# Patient Record
Sex: FEMALE | Race: WHITE | NOT HISPANIC OR LATINO | Employment: OTHER | URBAN - METROPOLITAN AREA
[De-identification: names, ages, dates, MRNs, and addresses within clinical notes are randomized per-mention and may not be internally consistent; named-entity substitution may affect disease eponyms.]

---

## 2017-02-27 ENCOUNTER — HOSPITAL ENCOUNTER (OUTPATIENT)
Dept: GENERAL RADIOLOGY | Facility: HOSPITAL | Age: 82
Discharge: HOME | End: 2017-02-27
Admitting: NURSE PRACTITIONER

## 2017-02-27 ENCOUNTER — TRANSCRIBE ORDERS (OUTPATIENT)
Dept: ADMINISTRATIVE | Facility: HOSPITAL | Age: 82
End: 2017-02-27

## 2017-02-27 DIAGNOSIS — M25.511 RIGHT SHOULDER PAIN, UNSPECIFIED CHRONICITY: Primary | ICD-10-CM

## 2017-02-27 DIAGNOSIS — M25.511 RIGHT SHOULDER PAIN, UNSPECIFIED CHRONICITY: ICD-10-CM

## 2017-02-27 PROCEDURE — 73030 X-RAY EXAM OF SHOULDER: CPT

## 2017-12-28 ENCOUNTER — LAB (OUTPATIENT)
Dept: LAB | Facility: HOSPITAL | Age: 82
End: 2017-12-28

## 2017-12-28 ENCOUNTER — TRANSCRIBE ORDERS (OUTPATIENT)
Dept: ADMINISTRATIVE | Facility: HOSPITAL | Age: 82
End: 2017-12-28

## 2017-12-28 DIAGNOSIS — M05.89 OTHER RHEUMATOID ARTHRITIS WITH RHEUMATOID FACTOR OF MULTIPLE SITES (CODE): ICD-10-CM

## 2017-12-28 DIAGNOSIS — M05.89 OTHER RHEUMATOID ARTHRITIS WITH RHEUMATOID FACTOR OF MULTIPLE SITES (CODE): Primary | ICD-10-CM

## 2017-12-28 LAB
ALBUMIN SERPL-MCNC: 3.8 G/DL (ref 3.5–5.2)
ALBUMIN/GLOB SERPL: 1.2 G/DL
ALP SERPL-CCNC: 65 U/L (ref 39–117)
ALT SERPL W P-5'-P-CCNC: 18 U/L (ref 1–33)
ANION GAP SERPL CALCULATED.3IONS-SCNC: 9.9 MMOL/L
AST SERPL-CCNC: 32 U/L (ref 1–32)
BASOPHILS # BLD AUTO: 0.03 10*3/MM3 (ref 0–0.2)
BASOPHILS NFR BLD AUTO: 0.5 % (ref 0–1.5)
BILIRUB SERPL-MCNC: 0.2 MG/DL (ref 0.1–1.2)
BUN BLD-MCNC: 21 MG/DL (ref 8–23)
BUN/CREAT SERPL: 26.9 (ref 7–25)
CALCIUM SPEC-SCNC: 8.7 MG/DL (ref 8.6–10.5)
CHLORIDE SERPL-SCNC: 102 MMOL/L (ref 98–107)
CO2 SERPL-SCNC: 29.1 MMOL/L (ref 22–29)
CREAT BLD-MCNC: 0.78 MG/DL (ref 0.57–1)
CRP SERPL-MCNC: 0.14 MG/DL (ref 0–0.5)
DEPRECATED RDW RBC AUTO: 56.8 FL (ref 37–54)
EOSINOPHIL # BLD AUTO: 0.29 10*3/MM3 (ref 0–0.7)
EOSINOPHIL NFR BLD AUTO: 5 % (ref 0.3–6.2)
ERYTHROCYTE [DISTWIDTH] IN BLOOD BY AUTOMATED COUNT: 15.3 % (ref 11.7–13)
GFR SERPL CREATININE-BSD FRML MDRD: 71 ML/MIN/1.73
GLOBULIN UR ELPH-MCNC: 3.3 GM/DL
GLUCOSE BLD-MCNC: 71 MG/DL (ref 65–99)
HCT VFR BLD AUTO: 38.2 % (ref 35.6–45.5)
HGB BLD-MCNC: 11.6 G/DL (ref 11.9–15.5)
IMM GRANULOCYTES # BLD: 0.02 10*3/MM3 (ref 0–0.03)
IMM GRANULOCYTES NFR BLD: 0.3 % (ref 0–0.5)
LYMPHOCYTES # BLD AUTO: 1.76 10*3/MM3 (ref 0.9–4.8)
LYMPHOCYTES NFR BLD AUTO: 30.4 % (ref 19.6–45.3)
MCH RBC QN AUTO: 31.4 PG (ref 26.9–32)
MCHC RBC AUTO-ENTMCNC: 30.4 G/DL (ref 32.4–36.3)
MCV RBC AUTO: 103.5 FL (ref 80.5–98.2)
MONOCYTES # BLD AUTO: 0.71 10*3/MM3 (ref 0.2–1.2)
MONOCYTES NFR BLD AUTO: 12.3 % (ref 5–12)
NEUTROPHILS # BLD AUTO: 2.98 10*3/MM3 (ref 1.9–8.1)
NEUTROPHILS NFR BLD AUTO: 51.5 % (ref 42.7–76)
PLATELET # BLD AUTO: 218 10*3/MM3 (ref 140–500)
PMV BLD AUTO: 12.3 FL (ref 6–12)
POTASSIUM BLD-SCNC: 4.1 MMOL/L (ref 3.5–5.2)
PROT SERPL-MCNC: 7.1 G/DL (ref 6–8.5)
RBC # BLD AUTO: 3.69 10*6/MM3 (ref 3.9–5.2)
SODIUM BLD-SCNC: 141 MMOL/L (ref 136–145)
WBC NRBC COR # BLD: 5.79 10*3/MM3 (ref 4.5–10.7)

## 2017-12-28 PROCEDURE — 86140 C-REACTIVE PROTEIN: CPT | Performed by: NURSE PRACTITIONER

## 2017-12-28 PROCEDURE — 80053 COMPREHEN METABOLIC PANEL: CPT | Performed by: NURSE PRACTITIONER

## 2017-12-28 PROCEDURE — 85025 COMPLETE CBC W/AUTO DIFF WBC: CPT | Performed by: NURSE PRACTITIONER

## 2017-12-28 PROCEDURE — 36415 COLL VENOUS BLD VENIPUNCTURE: CPT

## 2018-04-27 ENCOUNTER — TRANSCRIBE ORDERS (OUTPATIENT)
Dept: ADMINISTRATIVE | Facility: HOSPITAL | Age: 83
End: 2018-04-27

## 2018-04-27 ENCOUNTER — LAB (OUTPATIENT)
Dept: LAB | Facility: HOSPITAL | Age: 83
End: 2018-04-27

## 2018-04-27 DIAGNOSIS — M05.89 OTHER RHEUMATOID ARTHRITIS WITH RHEUMATOID FACTOR OF MULTIPLE SITES (CODE): Primary | ICD-10-CM

## 2018-04-27 DIAGNOSIS — M05.89 OTHER RHEUMATOID ARTHRITIS WITH RHEUMATOID FACTOR OF MULTIPLE SITES (CODE): ICD-10-CM

## 2018-04-27 LAB
ALBUMIN SERPL-MCNC: 3.9 G/DL (ref 3.5–5.2)
ALBUMIN/GLOB SERPL: 1.2 G/DL
ALP SERPL-CCNC: 71 U/L (ref 39–117)
ALT SERPL W P-5'-P-CCNC: 15 U/L (ref 1–33)
ANION GAP SERPL CALCULATED.3IONS-SCNC: 9.9 MMOL/L
AST SERPL-CCNC: 29 U/L (ref 1–32)
BASOPHILS # BLD AUTO: 0.02 10*3/MM3 (ref 0–0.2)
BASOPHILS NFR BLD AUTO: 0.3 % (ref 0–1.5)
BILIRUB SERPL-MCNC: 0.3 MG/DL (ref 0.1–1.2)
BUN BLD-MCNC: 24 MG/DL (ref 8–23)
BUN/CREAT SERPL: 27 (ref 7–25)
CALCIUM SPEC-SCNC: 9.3 MG/DL (ref 8.6–10.5)
CHLORIDE SERPL-SCNC: 103 MMOL/L (ref 98–107)
CO2 SERPL-SCNC: 27.1 MMOL/L (ref 22–29)
CREAT BLD-MCNC: 0.89 MG/DL (ref 0.57–1)
CRP SERPL-MCNC: 0.22 MG/DL (ref 0–0.5)
DEPRECATED RDW RBC AUTO: 65.5 FL (ref 37–54)
EOSINOPHIL # BLD AUTO: 0.3 10*3/MM3 (ref 0–0.7)
EOSINOPHIL NFR BLD AUTO: 4.2 % (ref 0.3–6.2)
ERYTHROCYTE [DISTWIDTH] IN BLOOD BY AUTOMATED COUNT: 17.5 % (ref 11.7–13)
GFR SERPL CREATININE-BSD FRML MDRD: 61 ML/MIN/1.73
GLOBULIN UR ELPH-MCNC: 3.2 GM/DL
GLUCOSE BLD-MCNC: 74 MG/DL (ref 65–99)
HCT VFR BLD AUTO: 37.6 % (ref 35.6–45.5)
HGB BLD-MCNC: 11.2 G/DL (ref 11.9–15.5)
IMM GRANULOCYTES # BLD: 0.02 10*3/MM3 (ref 0–0.03)
IMM GRANULOCYTES NFR BLD: 0.3 % (ref 0–0.5)
LYMPHOCYTES # BLD AUTO: 1.9 10*3/MM3 (ref 0.9–4.8)
LYMPHOCYTES NFR BLD AUTO: 26.7 % (ref 19.6–45.3)
MCH RBC QN AUTO: 30.5 PG (ref 26.9–32)
MCHC RBC AUTO-ENTMCNC: 29.8 G/DL (ref 32.4–36.3)
MCV RBC AUTO: 102.5 FL (ref 80.5–98.2)
MONOCYTES # BLD AUTO: 0.82 10*3/MM3 (ref 0.2–1.2)
MONOCYTES NFR BLD AUTO: 11.5 % (ref 5–12)
NEUTROPHILS # BLD AUTO: 4.06 10*3/MM3 (ref 1.9–8.1)
NEUTROPHILS NFR BLD AUTO: 57 % (ref 42.7–76)
PLATELET # BLD AUTO: 218 10*3/MM3 (ref 140–500)
PMV BLD AUTO: 11.8 FL (ref 6–12)
POTASSIUM BLD-SCNC: 4.7 MMOL/L (ref 3.5–5.2)
PROT SERPL-MCNC: 7.1 G/DL (ref 6–8.5)
RBC # BLD AUTO: 3.67 10*6/MM3 (ref 3.9–5.2)
SODIUM BLD-SCNC: 140 MMOL/L (ref 136–145)
WBC NRBC COR # BLD: 7.12 10*3/MM3 (ref 4.5–10.7)

## 2018-04-27 PROCEDURE — 36415 COLL VENOUS BLD VENIPUNCTURE: CPT

## 2018-04-27 PROCEDURE — 80053 COMPREHEN METABOLIC PANEL: CPT | Performed by: NURSE PRACTITIONER

## 2018-04-27 PROCEDURE — 86140 C-REACTIVE PROTEIN: CPT | Performed by: NURSE PRACTITIONER

## 2018-04-27 PROCEDURE — 85025 COMPLETE CBC W/AUTO DIFF WBC: CPT | Performed by: NURSE PRACTITIONER

## 2018-08-02 ENCOUNTER — TRANSCRIBE ORDERS (OUTPATIENT)
Dept: ADMINISTRATIVE | Facility: HOSPITAL | Age: 83
End: 2018-08-02

## 2018-08-02 ENCOUNTER — LAB (OUTPATIENT)
Dept: LAB | Facility: HOSPITAL | Age: 83
End: 2018-08-02

## 2018-08-02 DIAGNOSIS — M05.89 OTHER RHEUMATOID ARTHRITIS WITH RHEUMATOID FACTOR OF MULTIPLE SITES (CODE): Primary | ICD-10-CM

## 2018-08-02 DIAGNOSIS — M05.89 OTHER RHEUMATOID ARTHRITIS WITH RHEUMATOID FACTOR OF MULTIPLE SITES (CODE): ICD-10-CM

## 2018-08-02 LAB
ALBUMIN SERPL-MCNC: 3.9 G/DL (ref 3.5–5.2)
ALBUMIN/GLOB SERPL: 1.4 G/DL
ALP SERPL-CCNC: 57 U/L (ref 39–117)
ALT SERPL W P-5'-P-CCNC: 14 U/L (ref 1–33)
ANION GAP SERPL CALCULATED.3IONS-SCNC: 11.6 MMOL/L
AST SERPL-CCNC: 26 U/L (ref 1–32)
BASOPHILS # BLD AUTO: 0.02 10*3/MM3 (ref 0–0.2)
BASOPHILS NFR BLD AUTO: 0.3 % (ref 0–1.5)
BILIRUB SERPL-MCNC: 0.2 MG/DL (ref 0.1–1.2)
BUN BLD-MCNC: 27 MG/DL (ref 8–23)
BUN/CREAT SERPL: 21.6 (ref 7–25)
CALCIUM SPEC-SCNC: 8.4 MG/DL (ref 8.6–10.5)
CHLORIDE SERPL-SCNC: 103 MMOL/L (ref 98–107)
CO2 SERPL-SCNC: 25.4 MMOL/L (ref 22–29)
CREAT BLD-MCNC: 1.25 MG/DL (ref 0.57–1)
CRP SERPL-MCNC: 0.12 MG/DL (ref 0–0.5)
DEPRECATED RDW RBC AUTO: 58.3 FL (ref 37–54)
EOSINOPHIL # BLD AUTO: 0.34 10*3/MM3 (ref 0–0.7)
EOSINOPHIL NFR BLD AUTO: 4.6 % (ref 0.3–6.2)
ERYTHROCYTE [DISTWIDTH] IN BLOOD BY AUTOMATED COUNT: 15.5 % (ref 11.7–13)
GFR SERPL CREATININE-BSD FRML MDRD: 41 ML/MIN/1.73
GLOBULIN UR ELPH-MCNC: 2.7 GM/DL
GLUCOSE BLD-MCNC: 80 MG/DL (ref 65–99)
HCT VFR BLD AUTO: 36.2 % (ref 35.6–45.5)
HGB BLD-MCNC: 11.2 G/DL (ref 11.9–15.5)
IMM GRANULOCYTES # BLD: 0.02 10*3/MM3 (ref 0–0.03)
IMM GRANULOCYTES NFR BLD: 0.3 % (ref 0–0.5)
LYMPHOCYTES # BLD AUTO: 2.4 10*3/MM3 (ref 0.9–4.8)
LYMPHOCYTES NFR BLD AUTO: 32.6 % (ref 19.6–45.3)
MCH RBC QN AUTO: 31.6 PG (ref 26.9–32)
MCHC RBC AUTO-ENTMCNC: 30.9 G/DL (ref 32.4–36.3)
MCV RBC AUTO: 102.3 FL (ref 80.5–98.2)
MONOCYTES # BLD AUTO: 0.84 10*3/MM3 (ref 0.2–1.2)
MONOCYTES NFR BLD AUTO: 11.4 % (ref 5–12)
NEUTROPHILS # BLD AUTO: 3.76 10*3/MM3 (ref 1.9–8.1)
NEUTROPHILS NFR BLD AUTO: 51.1 % (ref 42.7–76)
PLATELET # BLD AUTO: 199 10*3/MM3 (ref 140–500)
PMV BLD AUTO: 12.2 FL (ref 6–12)
POTASSIUM BLD-SCNC: 4.5 MMOL/L (ref 3.5–5.2)
PROT SERPL-MCNC: 6.6 G/DL (ref 6–8.5)
RBC # BLD AUTO: 3.54 10*6/MM3 (ref 3.9–5.2)
SODIUM BLD-SCNC: 140 MMOL/L (ref 136–145)
WBC NRBC COR # BLD: 7.36 10*3/MM3 (ref 4.5–10.7)

## 2018-08-02 PROCEDURE — 86140 C-REACTIVE PROTEIN: CPT | Performed by: NURSE PRACTITIONER

## 2018-08-02 PROCEDURE — 80053 COMPREHEN METABOLIC PANEL: CPT | Performed by: NURSE PRACTITIONER

## 2018-08-02 PROCEDURE — 85025 COMPLETE CBC W/AUTO DIFF WBC: CPT | Performed by: NURSE PRACTITIONER

## 2018-08-02 PROCEDURE — 36415 COLL VENOUS BLD VENIPUNCTURE: CPT

## 2018-10-04 ENCOUNTER — TRANSCRIBE ORDERS (OUTPATIENT)
Dept: ADMINISTRATIVE | Facility: HOSPITAL | Age: 83
End: 2018-10-04

## 2018-10-04 ENCOUNTER — LAB (OUTPATIENT)
Dept: LAB | Facility: HOSPITAL | Age: 83
End: 2018-10-04

## 2018-10-04 DIAGNOSIS — M05.89 OTHER RHEUMATOID ARTHRITIS WITH RHEUMATOID FACTOR OF MULTIPLE SITES (HCC): Primary | ICD-10-CM

## 2018-10-04 DIAGNOSIS — M05.89 OTHER RHEUMATOID ARTHRITIS WITH RHEUMATOID FACTOR OF MULTIPLE SITES (HCC): ICD-10-CM

## 2018-10-04 LAB
ALBUMIN SERPL-MCNC: 3.6 G/DL (ref 3.5–5.2)
ALBUMIN/GLOB SERPL: 1.1 G/DL
ALP SERPL-CCNC: 63 U/L (ref 39–117)
ALT SERPL W P-5'-P-CCNC: 19 U/L (ref 1–33)
ANION GAP SERPL CALCULATED.3IONS-SCNC: 9.8 MMOL/L
AST SERPL-CCNC: 35 U/L (ref 1–32)
BASOPHILS # BLD AUTO: 0.01 10*3/MM3 (ref 0–0.2)
BASOPHILS NFR BLD AUTO: 0.2 % (ref 0–1.5)
BILIRUB SERPL-MCNC: 0.3 MG/DL (ref 0.1–1.2)
BUN BLD-MCNC: 22 MG/DL (ref 8–23)
BUN/CREAT SERPL: 26.5 (ref 7–25)
CALCIUM SPEC-SCNC: 8.7 MG/DL (ref 8.6–10.5)
CHLORIDE SERPL-SCNC: 101 MMOL/L (ref 98–107)
CO2 SERPL-SCNC: 27.2 MMOL/L (ref 22–29)
CREAT BLD-MCNC: 0.83 MG/DL (ref 0.57–1)
CRP SERPL-MCNC: 0.16 MG/DL (ref 0–0.5)
DEPRECATED RDW RBC AUTO: 53.6 FL (ref 37–54)
EOSINOPHIL # BLD AUTO: 0.29 10*3/MM3 (ref 0–0.7)
EOSINOPHIL NFR BLD AUTO: 4.9 % (ref 0.3–6.2)
ERYTHROCYTE [DISTWIDTH] IN BLOOD BY AUTOMATED COUNT: 14.9 % (ref 11.7–13)
GFR SERPL CREATININE-BSD FRML MDRD: 65 ML/MIN/1.73
GLOBULIN UR ELPH-MCNC: 3.3 GM/DL
GLUCOSE BLD-MCNC: 82 MG/DL (ref 65–99)
HCT VFR BLD AUTO: 36 % (ref 35.6–45.5)
HGB BLD-MCNC: 11.4 G/DL (ref 11.9–15.5)
IMM GRANULOCYTES # BLD: 0.01 10*3/MM3 (ref 0–0.03)
IMM GRANULOCYTES NFR BLD: 0.2 % (ref 0–0.5)
LYMPHOCYTES # BLD AUTO: 1.93 10*3/MM3 (ref 0.9–4.8)
LYMPHOCYTES NFR BLD AUTO: 32.6 % (ref 19.6–45.3)
MCH RBC QN AUTO: 31.5 PG (ref 26.9–32)
MCHC RBC AUTO-ENTMCNC: 31.7 G/DL (ref 32.4–36.3)
MCV RBC AUTO: 99.4 FL (ref 80.5–98.2)
MONOCYTES # BLD AUTO: 0.66 10*3/MM3 (ref 0.2–1.2)
MONOCYTES NFR BLD AUTO: 11.1 % (ref 5–12)
NEUTROPHILS # BLD AUTO: 3.03 10*3/MM3 (ref 1.9–8.1)
NEUTROPHILS NFR BLD AUTO: 51.2 % (ref 42.7–76)
PLATELET # BLD AUTO: 140 10*3/MM3 (ref 140–500)
PMV BLD AUTO: 11.1 FL (ref 6–12)
POTASSIUM BLD-SCNC: 4.6 MMOL/L (ref 3.5–5.2)
PROT SERPL-MCNC: 6.9 G/DL (ref 6–8.5)
RBC # BLD AUTO: 3.62 10*6/MM3 (ref 3.9–5.2)
SODIUM BLD-SCNC: 138 MMOL/L (ref 136–145)
WBC NRBC COR # BLD: 5.92 10*3/MM3 (ref 4.5–10.7)

## 2018-10-04 PROCEDURE — 86140 C-REACTIVE PROTEIN: CPT | Performed by: NURSE PRACTITIONER

## 2018-10-04 PROCEDURE — 85025 COMPLETE CBC W/AUTO DIFF WBC: CPT | Performed by: NURSE PRACTITIONER

## 2018-10-04 PROCEDURE — 36415 COLL VENOUS BLD VENIPUNCTURE: CPT

## 2018-10-04 PROCEDURE — 80053 COMPREHEN METABOLIC PANEL: CPT | Performed by: NURSE PRACTITIONER

## 2019-02-05 ENCOUNTER — LAB (OUTPATIENT)
Dept: LAB | Facility: HOSPITAL | Age: 84
End: 2019-02-05

## 2019-02-05 ENCOUNTER — TRANSCRIBE ORDERS (OUTPATIENT)
Dept: ADMINISTRATIVE | Facility: HOSPITAL | Age: 84
End: 2019-02-05

## 2019-02-05 DIAGNOSIS — M25.50 ARTHRALGIA, UNSPECIFIED JOINT: Primary | ICD-10-CM

## 2019-02-05 DIAGNOSIS — Z79.899 DRUG THERAPY: ICD-10-CM

## 2019-02-05 DIAGNOSIS — M25.50 ARTHRALGIA, UNSPECIFIED JOINT: ICD-10-CM

## 2019-02-05 DIAGNOSIS — M05.89 OTHER RHEUMATOID ARTHRITIS WITH RHEUMATOID FACTOR OF MULTIPLE SITES (HCC): ICD-10-CM

## 2019-02-05 LAB
ALBUMIN SERPL-MCNC: 3.9 G/DL (ref 3.5–5.2)
ALBUMIN/GLOB SERPL: 1.3 G/DL
ALP SERPL-CCNC: 66 U/L (ref 39–117)
ALT SERPL W P-5'-P-CCNC: 17 U/L (ref 1–33)
ANION GAP SERPL CALCULATED.3IONS-SCNC: 11.7 MMOL/L
AST SERPL-CCNC: 28 U/L (ref 1–32)
BASOPHILS # BLD AUTO: 0.02 10*3/MM3 (ref 0–0.2)
BASOPHILS NFR BLD AUTO: 0.3 % (ref 0–1.5)
BILIRUB SERPL-MCNC: 0.2 MG/DL (ref 0.1–1.2)
BUN BLD-MCNC: 21 MG/DL (ref 8–23)
BUN/CREAT SERPL: 22.8 (ref 7–25)
CALCIUM SPEC-SCNC: 8.9 MG/DL (ref 8.6–10.5)
CHLORIDE SERPL-SCNC: 103 MMOL/L (ref 98–107)
CO2 SERPL-SCNC: 26.3 MMOL/L (ref 22–29)
CREAT BLD-MCNC: 0.92 MG/DL (ref 0.57–1)
CRP SERPL-MCNC: 0.27 MG/DL (ref 0–0.5)
DEPRECATED RDW RBC AUTO: 57 FL (ref 37–54)
EOSINOPHIL # BLD AUTO: 0.38 10*3/MM3 (ref 0–0.7)
EOSINOPHIL NFR BLD AUTO: 4.9 % (ref 0.3–6.2)
ERYTHROCYTE [DISTWIDTH] IN BLOOD BY AUTOMATED COUNT: 15.3 % (ref 11.7–13)
GFR SERPL CREATININE-BSD FRML MDRD: 58 ML/MIN/1.73
GLOBULIN UR ELPH-MCNC: 3.1 GM/DL
GLUCOSE BLD-MCNC: 97 MG/DL (ref 65–99)
HCT VFR BLD AUTO: 37.2 % (ref 35.6–45.5)
HGB BLD-MCNC: 11.4 G/DL (ref 11.9–15.5)
IMM GRANULOCYTES # BLD AUTO: 0.02 10*3/MM3 (ref 0–0.03)
IMM GRANULOCYTES NFR BLD AUTO: 0.3 % (ref 0–0.5)
LYMPHOCYTES # BLD AUTO: 2.19 10*3/MM3 (ref 0.9–4.8)
LYMPHOCYTES NFR BLD AUTO: 28 % (ref 19.6–45.3)
MCH RBC QN AUTO: 31.5 PG (ref 26.9–32)
MCHC RBC AUTO-ENTMCNC: 30.6 G/DL (ref 32.4–36.3)
MCV RBC AUTO: 102.8 FL (ref 80.5–98.2)
MONOCYTES # BLD AUTO: 0.8 10*3/MM3 (ref 0.2–1.2)
MONOCYTES NFR BLD AUTO: 10.2 % (ref 5–12)
NEUTROPHILS # BLD AUTO: 4.44 10*3/MM3 (ref 1.9–8.1)
NEUTROPHILS NFR BLD AUTO: 56.6 % (ref 42.7–76)
PLATELET # BLD AUTO: 198 10*3/MM3 (ref 140–500)
PMV BLD AUTO: 11.8 FL (ref 6–12)
POTASSIUM BLD-SCNC: 4.2 MMOL/L (ref 3.5–5.2)
PROT SERPL-MCNC: 7 G/DL (ref 6–8.5)
RBC # BLD AUTO: 3.62 10*6/MM3 (ref 3.9–5.2)
SODIUM BLD-SCNC: 141 MMOL/L (ref 136–145)
WBC NRBC COR # BLD: 7.83 10*3/MM3 (ref 4.5–10.7)

## 2019-02-05 PROCEDURE — 85025 COMPLETE CBC W/AUTO DIFF WBC: CPT | Performed by: NURSE PRACTITIONER

## 2019-02-05 PROCEDURE — 86140 C-REACTIVE PROTEIN: CPT | Performed by: NURSE PRACTITIONER

## 2019-02-05 PROCEDURE — 36415 COLL VENOUS BLD VENIPUNCTURE: CPT

## 2019-02-05 PROCEDURE — 80053 COMPREHEN METABOLIC PANEL: CPT | Performed by: NURSE PRACTITIONER

## 2019-02-21 ENCOUNTER — HOSPITAL ENCOUNTER (OUTPATIENT)
Dept: GENERAL RADIOLOGY | Facility: HOSPITAL | Age: 84
Discharge: HOME | End: 2019-02-21
Admitting: NURSE PRACTITIONER

## 2019-02-21 ENCOUNTER — TRANSCRIBE ORDERS (OUTPATIENT)
Dept: ADMINISTRATIVE | Facility: HOSPITAL | Age: 84
End: 2019-02-21

## 2019-02-21 ENCOUNTER — HOSPITAL ENCOUNTER (OUTPATIENT)
Dept: GENERAL RADIOLOGY | Facility: HOSPITAL | Age: 84
Discharge: HOME | End: 2019-02-21

## 2019-02-21 DIAGNOSIS — M79.669 PAIN OF LOWER LEG, UNSPECIFIED LATERALITY: ICD-10-CM

## 2019-02-21 DIAGNOSIS — Z79.899 NEED FOR PROPHYLACTIC CHEMOTHERAPY: ICD-10-CM

## 2019-02-21 DIAGNOSIS — M05.842 OTHER RHEUMATOID ARTHRITIS WITH RHEUMATOID FACTOR OF LEFT HAND (HCC): ICD-10-CM

## 2019-02-21 DIAGNOSIS — M05.89 OTHER RHEUMATOID ARTHRITIS WITH RHEUMATOID FACTOR OF MULTIPLE SITES (HCC): Primary | ICD-10-CM

## 2019-02-21 DIAGNOSIS — M05.89 OTHER RHEUMATOID ARTHRITIS WITH RHEUMATOID FACTOR OF MULTIPLE SITES (HCC): ICD-10-CM

## 2019-02-21 PROCEDURE — 72220 X-RAY EXAM SACRUM TAILBONE: CPT

## 2019-02-21 PROCEDURE — 72110 X-RAY EXAM L-2 SPINE 4/>VWS: CPT

## 2019-04-04 ENCOUNTER — TRANSCRIBE ORDERS (OUTPATIENT)
Dept: ADMINISTRATIVE | Facility: HOSPITAL | Age: 84
End: 2019-04-04

## 2019-04-04 ENCOUNTER — LAB (OUTPATIENT)
Dept: LAB | Facility: HOSPITAL | Age: 84
End: 2019-04-04

## 2019-04-04 DIAGNOSIS — M05.842 OTHER RHEUMATOID ARTHRITIS WITH RHEUMATOID FACTOR OF LEFT HAND (HCC): Primary | ICD-10-CM

## 2019-04-04 DIAGNOSIS — M05.842 OTHER RHEUMATOID ARTHRITIS WITH RHEUMATOID FACTOR OF LEFT HAND (HCC): ICD-10-CM

## 2019-04-04 LAB
ALBUMIN SERPL-MCNC: 3.6 G/DL (ref 3.5–5.2)
ALBUMIN/GLOB SERPL: 1 G/DL
ALP SERPL-CCNC: 58 U/L (ref 39–117)
ALT SERPL W P-5'-P-CCNC: 16 U/L (ref 1–33)
ANION GAP SERPL CALCULATED.3IONS-SCNC: 8.5 MMOL/L
AST SERPL-CCNC: 29 U/L (ref 1–32)
BASOPHILS # BLD AUTO: 0.03 10*3/MM3 (ref 0–0.2)
BASOPHILS NFR BLD AUTO: 0.5 % (ref 0–1.5)
BILIRUB SERPL-MCNC: 0.2 MG/DL (ref 0.2–1.2)
BUN BLD-MCNC: 27 MG/DL (ref 8–23)
BUN/CREAT SERPL: 26.2 (ref 7–25)
CALCIUM SPEC-SCNC: 9.5 MG/DL (ref 8.6–10.5)
CHLORIDE SERPL-SCNC: 101 MMOL/L (ref 98–107)
CO2 SERPL-SCNC: 29.5 MMOL/L (ref 22–29)
CREAT BLD-MCNC: 1.03 MG/DL (ref 0.57–1)
CRP SERPL-MCNC: 0.19 MG/DL (ref 0–0.5)
DEPRECATED RDW RBC AUTO: 54.5 FL (ref 37–54)
EOSINOPHIL # BLD AUTO: 0.02 10*3/MM3 (ref 0–0.4)
EOSINOPHIL NFR BLD AUTO: 0.3 % (ref 0.3–6.2)
ERYTHROCYTE [DISTWIDTH] IN BLOOD BY AUTOMATED COUNT: 15 % (ref 12.3–15.4)
ERYTHROCYTE [SEDIMENTATION RATE] IN BLOOD: 31 MM/HR (ref 0–30)
GFR SERPL CREATININE-BSD FRML MDRD: 51 ML/MIN/1.73
GLOBULIN UR ELPH-MCNC: 3.6 GM/DL
GLUCOSE BLD-MCNC: 106 MG/DL (ref 65–99)
HCT VFR BLD AUTO: 37.5 % (ref 34–46.6)
HGB BLD-MCNC: 11.4 G/DL (ref 12–15.9)
IMM GRANULOCYTES # BLD AUTO: 0.03 10*3/MM3 (ref 0–0.05)
IMM GRANULOCYTES NFR BLD AUTO: 0.5 % (ref 0–0.5)
LYMPHOCYTES # BLD AUTO: 1.73 10*3/MM3 (ref 0.7–3.1)
LYMPHOCYTES NFR BLD AUTO: 29.3 % (ref 19.6–45.3)
MCH RBC QN AUTO: 30.5 PG (ref 26.6–33)
MCHC RBC AUTO-ENTMCNC: 30.4 G/DL (ref 31.5–35.7)
MCV RBC AUTO: 100.3 FL (ref 79–97)
MONOCYTES # BLD AUTO: 0.47 10*3/MM3 (ref 0.1–0.9)
MONOCYTES NFR BLD AUTO: 8 % (ref 5–12)
NEUTROPHILS # BLD AUTO: 3.62 10*3/MM3 (ref 1.4–7)
NEUTROPHILS NFR BLD AUTO: 61.4 % (ref 42.7–76)
NRBC BLD AUTO-RTO: 0 /100 WBC (ref 0–0)
PLATELET # BLD AUTO: 194 10*3/MM3 (ref 140–450)
PMV BLD AUTO: 11.7 FL (ref 6–12)
POTASSIUM BLD-SCNC: 4.4 MMOL/L (ref 3.5–5.2)
PROT SERPL-MCNC: 7.2 G/DL (ref 6–8.5)
RBC # BLD AUTO: 3.74 10*6/MM3 (ref 3.77–5.28)
SODIUM BLD-SCNC: 139 MMOL/L (ref 136–145)
WBC NRBC COR # BLD: 5.9 10*3/MM3 (ref 3.4–10.8)

## 2019-04-04 PROCEDURE — 80053 COMPREHEN METABOLIC PANEL: CPT | Performed by: NURSE PRACTITIONER

## 2019-04-04 PROCEDURE — 86140 C-REACTIVE PROTEIN: CPT | Performed by: NURSE PRACTITIONER

## 2019-04-04 PROCEDURE — 85025 COMPLETE CBC W/AUTO DIFF WBC: CPT | Performed by: NURSE PRACTITIONER

## 2019-04-04 PROCEDURE — 85652 RBC SED RATE AUTOMATED: CPT | Performed by: NURSE PRACTITIONER

## 2019-04-04 PROCEDURE — 36415 COLL VENOUS BLD VENIPUNCTURE: CPT

## 2019-06-21 ENCOUNTER — TRANSCRIBE ORDERS (OUTPATIENT)
Dept: ADMINISTRATIVE | Facility: HOSPITAL | Age: 84
End: 2019-06-21

## 2019-06-21 ENCOUNTER — HOSPITAL ENCOUNTER (OUTPATIENT)
Dept: GENERAL RADIOLOGY | Facility: HOSPITAL | Age: 84
Discharge: HOME | End: 2019-06-21
Admitting: INTERNAL MEDICINE

## 2019-06-21 DIAGNOSIS — R05.9 COUGH: Primary | ICD-10-CM

## 2019-06-21 DIAGNOSIS — R05.9 COUGH: ICD-10-CM

## 2019-06-21 PROCEDURE — 71046 X-RAY EXAM CHEST 2 VIEWS: CPT

## 2019-07-26 ENCOUNTER — LAB (OUTPATIENT)
Dept: LAB | Facility: HOSPITAL | Age: 84
End: 2019-07-26

## 2019-07-26 ENCOUNTER — TRANSCRIBE ORDERS (OUTPATIENT)
Dept: ADMINISTRATIVE | Facility: HOSPITAL | Age: 84
End: 2019-07-26

## 2019-07-26 DIAGNOSIS — M05.841: ICD-10-CM

## 2019-07-26 DIAGNOSIS — M05.841: Primary | ICD-10-CM

## 2019-07-26 LAB
ALBUMIN SERPL-MCNC: 3.9 G/DL (ref 3.5–5.2)
ALBUMIN/GLOB SERPL: 1.2 G/DL
ALP SERPL-CCNC: 56 U/L (ref 39–117)
ALT SERPL W P-5'-P-CCNC: 15 U/L (ref 1–33)
ANION GAP SERPL CALCULATED.3IONS-SCNC: 9.9 MMOL/L (ref 5–15)
AST SERPL-CCNC: 31 U/L (ref 1–32)
BASOPHILS # BLD AUTO: 0.03 10*3/MM3 (ref 0–0.2)
BASOPHILS NFR BLD AUTO: 0.5 % (ref 0–1.5)
BILIRUB SERPL-MCNC: 0.2 MG/DL (ref 0.2–1.2)
BUN BLD-MCNC: 21 MG/DL (ref 8–23)
BUN/CREAT SERPL: 18.1 (ref 7–25)
CALCIUM SPEC-SCNC: 9.3 MG/DL (ref 8.6–10.5)
CHLORIDE SERPL-SCNC: 103 MMOL/L (ref 98–107)
CO2 SERPL-SCNC: 26.1 MMOL/L (ref 22–29)
CREAT BLD-MCNC: 1.16 MG/DL (ref 0.57–1)
CRP SERPL-MCNC: 0.1 MG/DL (ref 0–0.5)
DEPRECATED RDW RBC AUTO: 59.9 FL (ref 37–54)
EOSINOPHIL # BLD AUTO: 0.2 10*3/MM3 (ref 0–0.4)
EOSINOPHIL NFR BLD AUTO: 3.1 % (ref 0.3–6.2)
ERYTHROCYTE [DISTWIDTH] IN BLOOD BY AUTOMATED COUNT: 16 % (ref 12.3–15.4)
ERYTHROCYTE [SEDIMENTATION RATE] IN BLOOD: 20 MM/HR (ref 0–30)
GFR SERPL CREATININE-BSD FRML MDRD: 45 ML/MIN/1.73
GLOBULIN UR ELPH-MCNC: 3.2 GM/DL
GLUCOSE BLD-MCNC: 79 MG/DL (ref 65–99)
HCT VFR BLD AUTO: 36.2 % (ref 34–46.6)
HGB BLD-MCNC: 11.1 G/DL (ref 12–15.9)
IMM GRANULOCYTES # BLD AUTO: 0.04 10*3/MM3 (ref 0–0.05)
IMM GRANULOCYTES NFR BLD AUTO: 0.6 % (ref 0–0.5)
LYMPHOCYTES # BLD AUTO: 1.97 10*3/MM3 (ref 0.7–3.1)
LYMPHOCYTES NFR BLD AUTO: 30.4 % (ref 19.6–45.3)
MCH RBC QN AUTO: 32 PG (ref 26.6–33)
MCHC RBC AUTO-ENTMCNC: 30.7 G/DL (ref 31.5–35.7)
MCV RBC AUTO: 104.3 FL (ref 79–97)
MONOCYTES # BLD AUTO: 0.75 10*3/MM3 (ref 0.1–0.9)
MONOCYTES NFR BLD AUTO: 11.6 % (ref 5–12)
NEUTROPHILS # BLD AUTO: 3.48 10*3/MM3 (ref 1.7–7)
NEUTROPHILS NFR BLD AUTO: 53.8 % (ref 42.7–76)
NRBC BLD AUTO-RTO: 0 /100 WBC (ref 0–0.2)
PLATELET # BLD AUTO: 207 10*3/MM3 (ref 140–450)
PMV BLD AUTO: 11.6 FL (ref 6–12)
POTASSIUM BLD-SCNC: 5 MMOL/L (ref 3.5–5.2)
PROT SERPL-MCNC: 7.1 G/DL (ref 6–8.5)
RBC # BLD AUTO: 3.47 10*6/MM3 (ref 3.77–5.28)
SODIUM BLD-SCNC: 139 MMOL/L (ref 136–145)
WBC NRBC COR # BLD: 6.47 10*3/MM3 (ref 3.4–10.8)

## 2019-07-26 PROCEDURE — 86140 C-REACTIVE PROTEIN: CPT | Performed by: NURSE PRACTITIONER

## 2019-07-26 PROCEDURE — 36415 COLL VENOUS BLD VENIPUNCTURE: CPT

## 2019-07-26 PROCEDURE — 85652 RBC SED RATE AUTOMATED: CPT | Performed by: NURSE PRACTITIONER

## 2019-07-26 PROCEDURE — 85025 COMPLETE CBC W/AUTO DIFF WBC: CPT | Performed by: NURSE PRACTITIONER

## 2019-07-26 PROCEDURE — 80053 COMPREHEN METABOLIC PANEL: CPT | Performed by: NURSE PRACTITIONER

## 2019-11-15 ENCOUNTER — APPOINTMENT (OUTPATIENT)
Dept: CT IMAGING | Facility: HOSPITAL | Age: 84
End: 2019-11-15

## 2019-11-15 ENCOUNTER — HOSPITAL ENCOUNTER (EMERGENCY)
Facility: HOSPITAL | Age: 84
Discharge: HOME | End: 2019-11-15
Attending: EMERGENCY MEDICINE | Admitting: EMERGENCY MEDICINE

## 2019-11-15 ENCOUNTER — APPOINTMENT (OUTPATIENT)
Dept: GENERAL RADIOLOGY | Facility: HOSPITAL | Age: 84
End: 2019-11-15

## 2019-11-15 VITALS
BODY MASS INDEX: 22.22 KG/M2 | WEIGHT: 117.7 LBS | RESPIRATION RATE: 18 BRPM | HEART RATE: 71 BPM | OXYGEN SATURATION: 93 % | DIASTOLIC BLOOD PRESSURE: 105 MMHG | HEIGHT: 61 IN | SYSTOLIC BLOOD PRESSURE: 158 MMHG | TEMPERATURE: 98.5 F

## 2019-11-15 DIAGNOSIS — R53.1 GENERALIZED WEAKNESS: Primary | ICD-10-CM

## 2019-11-15 DIAGNOSIS — F41.9 ANXIETY: ICD-10-CM

## 2019-11-15 LAB
ALBUMIN SERPL-MCNC: 4.2 G/DL (ref 3.5–5.2)
ALBUMIN/GLOB SERPL: 1.3 G/DL
ALP SERPL-CCNC: 70 U/L (ref 39–117)
ALT SERPL W P-5'-P-CCNC: 16 U/L (ref 1–33)
ANION GAP SERPL CALCULATED.3IONS-SCNC: 10.3 MMOL/L (ref 5–15)
AST SERPL-CCNC: 24 U/L (ref 1–32)
BACTERIA UR QL AUTO: ABNORMAL /HPF
BASOPHILS # BLD AUTO: 0.03 10*3/MM3 (ref 0–0.2)
BASOPHILS NFR BLD AUTO: 0.5 % (ref 0–1.5)
BILIRUB SERPL-MCNC: 0.2 MG/DL (ref 0.2–1.2)
BILIRUB UR QL STRIP: NEGATIVE
BUN BLD-MCNC: 22 MG/DL (ref 8–23)
BUN/CREAT SERPL: 26.2 (ref 7–25)
CALCIUM SPEC-SCNC: 8.7 MG/DL (ref 8.6–10.5)
CHLORIDE SERPL-SCNC: 105 MMOL/L (ref 98–107)
CLARITY UR: CLEAR
CO2 SERPL-SCNC: 25.7 MMOL/L (ref 22–29)
COLOR UR: YELLOW
CREAT BLD-MCNC: 0.84 MG/DL (ref 0.57–1)
DEPRECATED RDW RBC AUTO: 46.4 FL (ref 37–54)
EOSINOPHIL # BLD AUTO: 0 10*3/MM3 (ref 0–0.4)
EOSINOPHIL NFR BLD AUTO: 0 % (ref 0.3–6.2)
ERYTHROCYTE [DISTWIDTH] IN BLOOD BY AUTOMATED COUNT: 13.4 % (ref 12.3–15.4)
GFR SERPL CREATININE-BSD FRML MDRD: 64 ML/MIN/1.73
GLOBULIN UR ELPH-MCNC: 3.3 GM/DL
GLUCOSE BLD-MCNC: 103 MG/DL (ref 65–99)
GLUCOSE UR STRIP-MCNC: NEGATIVE MG/DL
HCT VFR BLD AUTO: 35.3 % (ref 34–46.6)
HGB BLD-MCNC: 11.6 G/DL (ref 12–15.9)
HGB UR QL STRIP.AUTO: NEGATIVE
HOLD SPECIMEN: NORMAL
HOLD SPECIMEN: NORMAL
HYALINE CASTS UR QL AUTO: ABNORMAL /LPF
IMM GRANULOCYTES # BLD AUTO: 0.05 10*3/MM3 (ref 0–0.05)
IMM GRANULOCYTES NFR BLD AUTO: 0.9 % (ref 0–0.5)
KETONES UR QL STRIP: NEGATIVE
LEUKOCYTE ESTERASE UR QL STRIP.AUTO: ABNORMAL
LYMPHOCYTES # BLD AUTO: 1.32 10*3/MM3 (ref 0.7–3.1)
LYMPHOCYTES NFR BLD AUTO: 23.4 % (ref 19.6–45.3)
MAGNESIUM SERPL-MCNC: 2.4 MG/DL (ref 1.6–2.4)
MCH RBC QN AUTO: 31.7 PG (ref 26.6–33)
MCHC RBC AUTO-ENTMCNC: 32.9 G/DL (ref 31.5–35.7)
MCV RBC AUTO: 96.4 FL (ref 79–97)
MONOCYTES # BLD AUTO: 0.9 10*3/MM3 (ref 0.1–0.9)
MONOCYTES NFR BLD AUTO: 16 % (ref 5–12)
NEUTROPHILS # BLD AUTO: 3.33 10*3/MM3 (ref 1.7–7)
NEUTROPHILS NFR BLD AUTO: 59.2 % (ref 42.7–76)
NITRITE UR QL STRIP: NEGATIVE
NRBC BLD AUTO-RTO: 0 /100 WBC (ref 0–0.2)
PH UR STRIP.AUTO: 7.5 [PH] (ref 5–8)
PLATELET # BLD AUTO: 207 10*3/MM3 (ref 140–450)
PMV BLD AUTO: 10.9 FL (ref 6–12)
POTASSIUM BLD-SCNC: 4 MMOL/L (ref 3.5–5.2)
PROT SERPL-MCNC: 7.5 G/DL (ref 6–8.5)
PROT UR QL STRIP: NEGATIVE
RBC # BLD AUTO: 3.66 10*6/MM3 (ref 3.77–5.28)
RBC # UR: ABNORMAL /HPF
REF LAB TEST METHOD: ABNORMAL
SODIUM BLD-SCNC: 141 MMOL/L (ref 136–145)
SP GR UR STRIP: 1.01 (ref 1–1.03)
SQUAMOUS #/AREA URNS HPF: ABNORMAL /HPF
TROPONIN T SERPL-MCNC: <0.01 NG/ML (ref 0–0.03)
UROBILINOGEN UR QL STRIP: ABNORMAL
WBC NRBC COR # BLD: 5.63 10*3/MM3 (ref 3.4–10.8)
WBC UR QL AUTO: ABNORMAL /HPF
WHOLE BLOOD HOLD SPECIMEN: NORMAL
WHOLE BLOOD HOLD SPECIMEN: NORMAL

## 2019-11-15 PROCEDURE — 85025 COMPLETE CBC W/AUTO DIFF WBC: CPT

## 2019-11-15 PROCEDURE — 93005 ELECTROCARDIOGRAM TRACING: CPT

## 2019-11-15 PROCEDURE — 84484 ASSAY OF TROPONIN QUANT: CPT

## 2019-11-15 PROCEDURE — 71045 X-RAY EXAM CHEST 1 VIEW: CPT

## 2019-11-15 PROCEDURE — 93010 ELECTROCARDIOGRAM REPORT: CPT | Performed by: INTERNAL MEDICINE

## 2019-11-15 PROCEDURE — 70450 CT HEAD/BRAIN W/O DYE: CPT

## 2019-11-15 PROCEDURE — 81001 URINALYSIS AUTO W/SCOPE: CPT | Performed by: EMERGENCY MEDICINE

## 2019-11-15 PROCEDURE — 83735 ASSAY OF MAGNESIUM: CPT

## 2019-11-15 PROCEDURE — 99284 EMERGENCY DEPT VISIT MOD MDM: CPT

## 2019-11-15 PROCEDURE — 93005 ELECTROCARDIOGRAM TRACING: CPT | Performed by: EMERGENCY MEDICINE

## 2019-11-15 PROCEDURE — 36415 COLL VENOUS BLD VENIPUNCTURE: CPT

## 2019-11-15 PROCEDURE — 80053 COMPREHEN METABOLIC PANEL: CPT

## 2019-11-15 RX ORDER — AMLODIPINE BESYLATE 5 MG/1
5 TABLET ORAL ONCE
Status: COMPLETED | OUTPATIENT
Start: 2019-11-15 | End: 2019-11-15

## 2019-11-15 RX ORDER — SODIUM CHLORIDE 0.9 % (FLUSH) 0.9 %
10 SYRINGE (ML) INJECTION AS NEEDED
Status: DISCONTINUED | OUTPATIENT
Start: 2019-11-15 | End: 2019-11-16 | Stop reason: HOSPADM

## 2019-11-15 RX ADMIN — AMLODIPINE BESYLATE 5 MG: 5 TABLET ORAL at 20:11

## 2019-11-15 NOTE — ED TRIAGE NOTES
"Pt reports dizziness today and \"not feeling right\", pt also reports hot flashes, went to [facility] told /104 pt takes blood pressure medications. Pt also reports generalized weakness. Pt denies chest pain, SOA, headache.   "

## 2019-11-15 NOTE — ED PROVIDER NOTES
" EMERGENCY DEPARTMENT ENCOUNTER    CHIEF COMPLAINT  Chief Complaint: Sore Throat  History given by: Patient  History limited by:   Room Number: 22/22  PMD: Kelsey Prabhakar MD      HPI:  Pt is a 85 y.o. female who presents complaining of sore throat that she describes as a \"lump\" for the past 10 days. Pt reports that due to the discomfort she has been unable to sleep well since that time. No dysphagia and has been able to tolerate PO, but states some odynophagia. Pt states that she has also had a subjective fever and reports feeling \"flushed\". Pt was seen at [facility] today around 1300 and noted to have HTN with BP of 211/104. She states a hx of HTN and takes Benicar and Norvasc. She reports last dose was this AM. Pt states a HA. No new medications. No CP or SOA      PAST MEDICAL HISTORY  Active Ambulatory Problems     Diagnosis Date Noted   • No Active Ambulatory Problems     Resolved Ambulatory Problems     Diagnosis Date Noted   • No Resolved Ambulatory Problems     Past Medical History:   Diagnosis Date   • Arthritis    • Depression    • Disease of thyroid gland    • Hypertension        PAST SURGICAL HISTORY  Past Surgical History:   Procedure Laterality Date   • TONSILLECTOMY         FAMILY HISTORY  History reviewed. No pertinent family history.    SOCIAL HISTORY  Social History     Socioeconomic History   • Marital status:      Spouse name: Not on file   • Number of children: Not on file   • Years of education: Not on file   • Highest education level: Not on file   Tobacco Use   • Smoking status: Never Smoker   • Smokeless tobacco: Never Used   Substance and Sexual Activity   • Alcohol use: No     Frequency: Never   • Drug use: No       ALLERGIES  Patient has no known allergies.    REVIEW OF SYSTEMS  Review of Systems   Constitutional: Positive for fever.   HENT: Positive for sore throat.    Eyes: Negative.    Respiratory: Negative for cough and shortness of breath.    Cardiovascular: Negative for chest pain. "        HTN   Gastrointestinal: Negative for abdominal pain, diarrhea and vomiting.   Genitourinary: Negative for dysuria.   Musculoskeletal: Negative for neck pain.   Skin: Negative for rash.   Allergic/Immunologic: Negative.    Neurological: Positive for headaches. Negative for weakness and numbness.   Hematological: Negative.    Psychiatric/Behavioral: Negative.    All other systems reviewed and are negative.      PHYSICAL EXAM  ED Triage Vitals   Temp Heart Rate Resp BP SpO2   11/15/19 1656 11/15/19 1656 11/15/19 1656 11/15/19 1711 11/15/19 1656   98.5 °F (36.9 °C) 83 16 (!) 201/108 99 %      Temp src Heart Rate Source Patient Position BP Location FiO2 (%)   -- -- -- -- --              Physical Exam   Constitutional: She is oriented to person, place, and time. No distress.   HENT:   Head: Normocephalic and atraumatic.   Eyes: EOM are normal. Pupils are equal, round, and reactive to light.   Neck: Normal range of motion. Neck supple.   Cardiovascular: Normal rate, regular rhythm and normal heart sounds.   Pulmonary/Chest: Effort normal and breath sounds normal. No respiratory distress.   Abdominal: Soft. There is no tenderness. There is no rebound and no guarding.   Musculoskeletal: Normal range of motion. She exhibits no edema.   Neurological: She is alert and oriented to person, place, and time. She has normal sensation and normal strength. She displays normal speech.   Skin: Skin is warm and dry. No rash noted.   Chronic skin changes BLE   Psychiatric: Mood and affect normal.   Nursing note and vitals reviewed.      LAB RESULTS  Lab Results (last 24 hours)     Procedure Component Value Units Date/Time    CBC & Differential [[id number]] Collected:  11/15/19 1721    Specimen:  Blood Updated:  11/15/19 1733    Narrative:       The following orders were created for panel order CBC & Differential.  Procedure                               Abnormality         Status                     ---------                                -----------         ------                     CBC Auto Differential[[id number]]        Abnormal            Final result                 Please view results for these tests on the individual orders.    Comprehensive Metabolic Panel [[id number]]  (Abnormal) Collected:  11/15/19 1721    Specimen:  Blood Updated:  11/15/19 1757     Glucose 103 mg/dL      BUN 22 mg/dL      Creatinine 0.84 mg/dL      Sodium 141 mmol/L      Potassium 4.0 mmol/L      Chloride 105 mmol/L      CO2 25.7 mmol/L      Calcium 8.7 mg/dL      Total Protein 7.5 g/dL      Albumin 4.20 g/dL      ALT (SGPT) 16 U/L      AST (SGOT) 24 U/L      Alkaline Phosphatase 70 U/L      Total Bilirubin 0.2 mg/dL      eGFR Non African Amer 64 mL/min/1.73      Globulin 3.3 gm/dL      A/G Ratio 1.3 g/dL      BUN/Creatinine Ratio 26.2     Anion Gap 10.3 mmol/L     Narrative:       GFR Normal >60  Chronic Kidney Disease <60  Kidney Failure <15    Troponin [[id number]]  (Normal) Collected:  11/15/19 1721    Specimen:  Blood Updated:  11/15/19 1757     Troponin T <0.010 ng/mL     Narrative:       Troponin T Reference Range:  <= 0.03 ng/mL-   Negative for AMI  >0.03 ng/mL-     Abnormal for myocardial necrosis.  Clinicians would have to utilize clinical acumen, EKG, Troponin and serial changes to determine if it is an Acute Myocardial Infarction or myocardial injury due to an underlying chronic condition.     Magnesium [[id number]]  (Normal) Collected:  11/15/19 1721    Specimen:  Blood Updated:  11/15/19 1757     Magnesium 2.4 mg/dL     CBC Auto Differential [[id number]]  (Abnormal) Collected:  11/15/19 1721    Specimen:  Blood Updated:  11/15/19 1733     WBC 5.63 10*3/mm3      RBC 3.66 10*6/mm3      Hemoglobin 11.6 g/dL      Hematocrit 35.3 %      MCV 96.4 fL      MCH 31.7 pg      MCHC 32.9 g/dL      RDW 13.4 %      RDW-SD 46.4 fl      MPV 10.9 fL      Platelets 207 10*3/mm3      Neutrophil % 59.2 %      Lymphocyte % 23.4 %      Monocyte % 16.0 %      Eosinophil % 0.0 %       Basophil % 0.5 %      Immature Grans % 0.9 %      Neutrophils, Absolute 3.33 10*3/mm3      Lymphocytes, Absolute 1.32 10*3/mm3      Monocytes, Absolute 0.90 10*3/mm3      Eosinophils, Absolute 0.00 10*3/mm3      Basophils, Absolute 0.03 10*3/mm3      Immature Grans, Absolute 0.05 10*3/mm3      nRBC 0.0 /100 WBC     Urinalysis With Microscopic If Indicated (No Culture) - Urine, Clean Catch [[id number]]  (Abnormal) Collected:  11/15/19 2215    Specimen:  Urine, Clean Catch Updated:  11/15/19 2250     Color, UA Yellow     Appearance, UA Clear     pH, UA 7.5     Specific Gravity, UA 1.012     Glucose, UA Negative     Ketones, UA Negative     Bilirubin, UA Negative     Blood, UA Negative     Protein, UA Negative     Leuk Esterase, UA Large (3+)     Nitrite, UA Negative     Urobilinogen, UA 0.2 E.U./dL    Urinalysis, Microscopic Only - Urine, Clean Catch [[id number]]  (Abnormal) Collected:  11/15/19 2215    Specimen:  Urine, Clean Catch Updated:  11/15/19 2250     RBC, UA 6-12 /HPF      WBC, UA Too Numerous to Count /HPF      Bacteria, UA None Seen /HPF      Squamous Epithelial Cells, UA 0-2 /HPF      Hyaline Casts, UA 3-6 /LPF      Methodology Automated Microscopy          I ordered the above labs and reviewed the results    RADIOLOGY  CT Head Without Contrast   Final Result   CT SCAN OF THE BRAIN WITHOUT CONTRAST     HISTORY: Hypertension. Dizziness.     TECHNIQUE: The CT scan was performed as an emergency procedure through  the brain without contrast.      FINDINGS: There is moderate diffuse atrophy and chronic small vessel  ischemic change similar to the study of 05/24/2016. There is no evidence  of acute intracranial hemorrhage or focal lesion or mass effect and the  visualized sinuses are clear.                 Radiation dose reduction techniques were utilized, including automated  exposure control and exposure modulation based on body size.     This report was finalized on 11/15/2019 8:22 PM by Dr. Willoughby  JAYDE Gee.   XR Chest 1 View   Final Result   Blunting of the right costophrenic angle potentially   represents a small pleural effusion. No other evidence for active   disease in the chest.       This report was finalized on 11/15/2019 7:01 PM by Dr. Valeriano Sauceda M.D.               I ordered the above noted radiological studies. Interpreted by radiologist. Discussed with radiologist (Dr Gee). Reviewed by me in PACS.       PROCEDURES  Procedures    See MD note for EKG interpretation    PROGRESS AND CONSULTS     6:20 PM  Ordered blood work, EKG, CXR, CT head.    7:55 PM  Reviewed pt's history and workup with Dr. Breen.  After a bedside evaluation; Dr Breen agrees with the plan of care.    10:59 PM  Rechecked with pt. Discussed her unremarkable labs, but pending UA and her negative CT. Informed the pt that I will contact her if her UA is abnormal. Pt understands and agrees with plan. All concerns were addressed.          MEDICAL DECISION MAKING  Results were reviewed/discussed with the patient and they were also made aware of online access. Pt also made aware that some labs, such as cultures, will not be resulted during ER visit and follow up with PMD is necessary.     MDM  Number of Diagnoses or Management Options     Amount and/or Complexity of Data Reviewed  Clinical lab tests: reviewed and ordered  Tests in the radiology section of CPT®: reviewed and ordered (negative)  Tests in the medicine section of CPT®: ordered  Discussion of test results with the performing providers: yes (Dr. Gee)           DIAGNOSIS  Final diagnoses:   Generalized weakness   Anxiety       DISPOSITION  DISCHARGE    Patient discharged in stable condition.    Reviewed implications of results, diagnosis, meds, responsibility to follow up, warning signs and symptoms of possible worsening, potential complications and reasons to return to ER.    Patient/Family voiced understanding of above instructions.    Discussed plan  for discharge, as there is no emergent indication for admission. Patient referred to primary care provider for BP management due to today's BP. Pt/family is agreeable and understands need for follow up and repeat testing.  Pt is aware that discharge does not mean that nothing is wrong but it indicates no emergency is present that requires admission and they must continue care with follow-up as given below or physician of their choice.     FOLLOW-UP  Kelsey Prabhakar MD  [address] [facility] [id number] [phone]    Call            Medication List      No changes were made to your prescriptions during this visit.           Latest Documented Vital Signs:  As of 12:02 AM  BP- (!) 158/105 HR- 71 Temp- 98.5 °F (36.9 °C) O2 sat- 93%    --  Documentation assistance provided by niels Mark for ADAIR Souza.  Information recorded by the scribe was done at my direction and has been verified and validated by me.         Hong Mark  11/15/19 8307       Hong Mark  11/15/19 230       Maren Nice APRN  11/16/19 0002

## 2019-11-16 NOTE — ED PROVIDER NOTES
MD ATTESTATION NOTE    The RORY and I have discussed this patient's history, physical exam, and treatment plan.  I have reviewed the documentation and personally had a face to face interaction with the patient. I affirm the documentation and agree with the treatment and plan.  The attached note describes my personal findings.      Verona Avila is a 85 y.o. female who presents to the ED c/o feeling fatigued for the past 10 days.  States that she had less energy.  She notes that she also sometimes has a lump in her throat.  It is not actually a lump in her words but just feels like it is sore at times.  She states that it is worse when she lays flat.  She does not notice it at all during the day.  Her symptoms are intermittent.  She denies having any headache, chest pain or shortness of breath.  I have confirmed this after reading the triage exam and discussing with the nurse practitioner.  She reports subjective fevers.    Son states that his mother looks to be her normal self.  She is acting normally.    On exam:  Recent and remote memory functions are normal. The patient is attentive with normal concentration. Language is fluent. Speech is clear. The speech is non-dysarthric. Fund of knowledge is normal.   Symmetric smile with no facial droop.  Eyes close shut strongly bilaterally.  Symmetric eyebrow raise bilaterally.  EOMI, PERRL  CN II-XII grossly normal otherwise.  5/5 strength to extremities.  No pronator drift.  Intact FNF.  No meningismus.   Normal gait according to son    Regular rate and rhythm  Clear to auscultation bilaterally  Abdomen soft and nontender  Mucous membranes moist, no trismus, no voice changes      Medical Decision Making:     EKG interpreted by myself.  Time 1727.  Sinus rhythm.  Heart rate 81.  Biatrial abnormality.  Right bundle branch block.  No acute ST abnormalities.    Discharge home     James Breen II, MD  11/16/19 [id number]

## 2019-11-16 NOTE — DISCHARGE INSTRUCTIONS
Home to rest  Drink plenty of fluids  Return if worse or new concerns   Continue care with your primary care physician and have your blood pressure regularly checked and managed. Normal blood pressure is 120/80.

## 2020-01-15 ENCOUNTER — LAB (OUTPATIENT)
Dept: LAB | Facility: HOSPITAL | Age: 85
End: 2020-01-15

## 2020-01-15 ENCOUNTER — TRANSCRIBE ORDERS (OUTPATIENT)
Dept: ADMINISTRATIVE | Facility: HOSPITAL | Age: 85
End: 2020-01-15

## 2020-01-15 DIAGNOSIS — M05.89 OTHER RHEUMATOID ARTHRITIS WITH RHEUMATOID FACTOR OF MULTIPLE SITES (HCC): ICD-10-CM

## 2020-01-15 DIAGNOSIS — M05.89 OTHER RHEUMATOID ARTHRITIS WITH RHEUMATOID FACTOR OF MULTIPLE SITES (HCC): Primary | ICD-10-CM

## 2020-01-15 LAB
ALBUMIN SERPL-MCNC: 3.9 G/DL (ref 3.5–5.2)
ALBUMIN/GLOB SERPL: 1.2 G/DL
ALP SERPL-CCNC: 61 U/L (ref 39–117)
ALT SERPL W P-5'-P-CCNC: 17 U/L (ref 1–33)
ANION GAP SERPL CALCULATED.3IONS-SCNC: 11.4 MMOL/L (ref 5–15)
AST SERPL-CCNC: 30 U/L (ref 1–32)
BASOPHILS # BLD AUTO: 0.03 10*3/MM3 (ref 0–0.2)
BASOPHILS NFR BLD AUTO: 0.5 % (ref 0–1.5)
BILIRUB SERPL-MCNC: 0.2 MG/DL (ref 0.2–1.2)
BUN BLD-MCNC: 20 MG/DL (ref 8–23)
BUN/CREAT SERPL: 25.3 (ref 7–25)
CALCIUM SPEC-SCNC: 9.2 MG/DL (ref 8.6–10.5)
CHLORIDE SERPL-SCNC: 104 MMOL/L (ref 98–107)
CO2 SERPL-SCNC: 27.6 MMOL/L (ref 22–29)
CREAT BLD-MCNC: 0.79 MG/DL (ref 0.57–1)
CRP SERPL-MCNC: 0.34 MG/DL (ref 0–0.5)
DEPRECATED RDW RBC AUTO: 48.7 FL (ref 37–54)
EOSINOPHIL # BLD AUTO: 0.22 10*3/MM3 (ref 0–0.4)
EOSINOPHIL NFR BLD AUTO: 3.5 % (ref 0.3–6.2)
ERYTHROCYTE [DISTWIDTH] IN BLOOD BY AUTOMATED COUNT: 13.8 % (ref 12.3–15.4)
ERYTHROCYTE [SEDIMENTATION RATE] IN BLOOD: 23 MM/HR (ref 0–30)
GFR SERPL CREATININE-BSD FRML MDRD: 69 ML/MIN/1.73
GLOBULIN UR ELPH-MCNC: 3.2 GM/DL
GLUCOSE BLD-MCNC: 56 MG/DL (ref 65–99)
HCT VFR BLD AUTO: 36.2 % (ref 34–46.6)
HGB BLD-MCNC: 11.5 G/DL (ref 12–15.9)
IMM GRANULOCYTES # BLD AUTO: 0.02 10*3/MM3 (ref 0–0.05)
IMM GRANULOCYTES NFR BLD AUTO: 0.3 % (ref 0–0.5)
LYMPHOCYTES # BLD AUTO: 1.65 10*3/MM3 (ref 0.7–3.1)
LYMPHOCYTES NFR BLD AUTO: 26.3 % (ref 19.6–45.3)
MCH RBC QN AUTO: 30.9 PG (ref 26.6–33)
MCHC RBC AUTO-ENTMCNC: 31.8 G/DL (ref 31.5–35.7)
MCV RBC AUTO: 97.3 FL (ref 79–97)
MONOCYTES # BLD AUTO: 0.6 10*3/MM3 (ref 0.1–0.9)
MONOCYTES NFR BLD AUTO: 9.6 % (ref 5–12)
NEUTROPHILS # BLD AUTO: 3.76 10*3/MM3 (ref 1.7–7)
NEUTROPHILS NFR BLD AUTO: 59.8 % (ref 42.7–76)
NRBC BLD AUTO-RTO: 0.2 /100 WBC (ref 0–0.2)
PLATELET # BLD AUTO: 214 10*3/MM3 (ref 140–450)
PMV BLD AUTO: 11.3 FL (ref 6–12)
POTASSIUM BLD-SCNC: 5 MMOL/L (ref 3.5–5.2)
PROT SERPL-MCNC: 7.1 G/DL (ref 6–8.5)
RBC # BLD AUTO: 3.72 10*6/MM3 (ref 3.77–5.28)
SODIUM BLD-SCNC: 143 MMOL/L (ref 136–145)
WBC NRBC COR # BLD: 6.28 10*3/MM3 (ref 3.4–10.8)

## 2020-01-15 PROCEDURE — 86140 C-REACTIVE PROTEIN: CPT | Performed by: INTERNAL MEDICINE

## 2020-01-15 PROCEDURE — 36415 COLL VENOUS BLD VENIPUNCTURE: CPT

## 2020-01-15 PROCEDURE — 85025 COMPLETE CBC W/AUTO DIFF WBC: CPT | Performed by: INTERNAL MEDICINE

## 2020-01-15 PROCEDURE — 85652 RBC SED RATE AUTOMATED: CPT | Performed by: INTERNAL MEDICINE

## 2020-01-15 PROCEDURE — 80053 COMPREHEN METABOLIC PANEL: CPT | Performed by: INTERNAL MEDICINE

## 2020-03-17 ENCOUNTER — LAB (OUTPATIENT)
Dept: LAB | Facility: HOSPITAL | Age: 85
End: 2020-03-17

## 2020-03-17 ENCOUNTER — TRANSCRIBE ORDERS (OUTPATIENT)
Dept: ADMINISTRATIVE | Facility: HOSPITAL | Age: 85
End: 2020-03-17

## 2020-03-17 DIAGNOSIS — M05.89 OTHER RHEUMATOID ARTHRITIS WITH RHEUMATOID FACTOR OF MULTIPLE SITES (HCC): ICD-10-CM

## 2020-03-17 DIAGNOSIS — M05.89 OTHER RHEUMATOID ARTHRITIS WITH RHEUMATOID FACTOR OF MULTIPLE SITES (HCC): Primary | ICD-10-CM

## 2020-03-17 LAB
ALBUMIN SERPL-MCNC: 4.1 G/DL (ref 3.5–5.2)
ALBUMIN/GLOB SERPL: 1.7 G/DL
ALP SERPL-CCNC: 65 U/L (ref 39–117)
ALT SERPL W P-5'-P-CCNC: 20 U/L (ref 1–33)
ANION GAP SERPL CALCULATED.3IONS-SCNC: 10.4 MMOL/L (ref 5–15)
AST SERPL-CCNC: 30 U/L (ref 1–32)
BILIRUB SERPL-MCNC: 0.3 MG/DL (ref 0.2–1.2)
BUN BLD-MCNC: 26 MG/DL (ref 8–23)
BUN/CREAT SERPL: 23.2 (ref 7–25)
CALCIUM SPEC-SCNC: 8.8 MG/DL (ref 8.6–10.5)
CHLORIDE SERPL-SCNC: 103 MMOL/L (ref 98–107)
CO2 SERPL-SCNC: 27.6 MMOL/L (ref 22–29)
CREAT BLD-MCNC: 1.12 MG/DL (ref 0.57–1)
CRP SERPL-MCNC: 0.16 MG/DL (ref 0–0.5)
DEPRECATED RDW RBC AUTO: 46.8 FL (ref 37–54)
EOSINOPHIL # BLD MANUAL: 0.33 10*3/MM3 (ref 0–0.4)
EOSINOPHIL NFR BLD MANUAL: 5 % (ref 0.3–6.2)
ERYTHROCYTE [DISTWIDTH] IN BLOOD BY AUTOMATED COUNT: 13.9 % (ref 12.3–15.4)
ERYTHROCYTE [SEDIMENTATION RATE] IN BLOOD: 17 MM/HR (ref 0–30)
GFR SERPL CREATININE-BSD FRML MDRD: 46 ML/MIN/1.73
GLOBULIN UR ELPH-MCNC: 2.4 GM/DL
GLUCOSE BLD-MCNC: 104 MG/DL (ref 65–99)
HCT VFR BLD AUTO: 35 % (ref 34–46.6)
HGB BLD-MCNC: 11.5 G/DL (ref 12–15.9)
LYMPHOCYTES # BLD MANUAL: 1.79 10*3/MM3 (ref 0.7–3.1)
LYMPHOCYTES NFR BLD MANUAL: 2 % (ref 5–12)
LYMPHOCYTES NFR BLD MANUAL: 27 % (ref 19.6–45.3)
MCH RBC QN AUTO: 31.1 PG (ref 26.6–33)
MCHC RBC AUTO-ENTMCNC: 32.9 G/DL (ref 31.5–35.7)
MCV RBC AUTO: 94.6 FL (ref 79–97)
MONOCYTES # BLD AUTO: 0.13 10*3/MM3 (ref 0.1–0.9)
NEUTROPHILS # BLD AUTO: 4.37 10*3/MM3 (ref 1.7–7)
NEUTROPHILS NFR BLD MANUAL: 66 % (ref 42.7–76)
PLAT MORPH BLD: NORMAL
PLATELET # BLD AUTO: 211 10*3/MM3 (ref 140–450)
PMV BLD AUTO: 11.9 FL (ref 6–12)
POTASSIUM BLD-SCNC: 4.6 MMOL/L (ref 3.5–5.2)
PROT SERPL-MCNC: 6.5 G/DL (ref 6–8.5)
RBC # BLD AUTO: 3.7 10*6/MM3 (ref 3.77–5.28)
RBC MORPH BLD: NORMAL
SODIUM BLD-SCNC: 141 MMOL/L (ref 136–145)
WBC MORPH BLD: NORMAL
WBC NRBC COR # BLD: 6.62 10*3/MM3 (ref 3.4–10.8)

## 2020-03-17 PROCEDURE — 80053 COMPREHEN METABOLIC PANEL: CPT | Performed by: INTERNAL MEDICINE

## 2020-03-17 PROCEDURE — 85007 BL SMEAR W/DIFF WBC COUNT: CPT | Performed by: INTERNAL MEDICINE

## 2020-03-17 PROCEDURE — 85652 RBC SED RATE AUTOMATED: CPT | Performed by: INTERNAL MEDICINE

## 2020-03-17 PROCEDURE — 85025 COMPLETE CBC W/AUTO DIFF WBC: CPT | Performed by: INTERNAL MEDICINE

## 2020-03-17 PROCEDURE — 36415 COLL VENOUS BLD VENIPUNCTURE: CPT

## 2020-03-17 PROCEDURE — 86140 C-REACTIVE PROTEIN: CPT | Performed by: INTERNAL MEDICINE

## 2020-05-21 ENCOUNTER — TRANSCRIBE ORDERS (OUTPATIENT)
Dept: ADMINISTRATIVE | Facility: HOSPITAL | Age: 85
End: 2020-05-21

## 2020-05-21 ENCOUNTER — LAB (OUTPATIENT)
Dept: LAB | Facility: HOSPITAL | Age: 85
End: 2020-05-21

## 2020-05-21 DIAGNOSIS — M05.89 OTHER RHEUMATOID ARTHRITIS WITH RHEUMATOID FACTOR OF MULTIPLE SITES (HCC): Primary | ICD-10-CM

## 2020-05-21 DIAGNOSIS — M05.89 OTHER RHEUMATOID ARTHRITIS WITH RHEUMATOID FACTOR OF MULTIPLE SITES (HCC): ICD-10-CM

## 2020-05-21 DIAGNOSIS — Z79.899 ENCOUNTER FOR LONG-TERM (CURRENT) USE OF OTHER MEDICATIONS: ICD-10-CM

## 2020-05-21 DIAGNOSIS — M81.0 SENILE OSTEOPOROSIS: ICD-10-CM

## 2020-05-21 LAB
ALBUMIN SERPL-MCNC: 3.9 G/DL (ref 3.5–5.2)
ALBUMIN/GLOB SERPL: 1.5 G/DL
ALP SERPL-CCNC: 58 U/L (ref 39–117)
ALT SERPL W P-5'-P-CCNC: 19 U/L (ref 1–33)
ANION GAP SERPL CALCULATED.3IONS-SCNC: 9.3 MMOL/L (ref 5–15)
AST SERPL-CCNC: 24 U/L (ref 1–32)
BASOPHILS # BLD AUTO: 0.03 10*3/MM3 (ref 0–0.2)
BASOPHILS NFR BLD AUTO: 0.4 % (ref 0–1.5)
BILIRUB SERPL-MCNC: 0.2 MG/DL (ref 0.2–1.2)
BUN BLD-MCNC: 19 MG/DL (ref 8–23)
BUN/CREAT SERPL: 19.8 (ref 7–25)
CALCIUM SPEC-SCNC: 8.6 MG/DL (ref 8.6–10.5)
CHLORIDE SERPL-SCNC: 104 MMOL/L (ref 98–107)
CO2 SERPL-SCNC: 25.7 MMOL/L (ref 22–29)
CREAT BLD-MCNC: 0.96 MG/DL (ref 0.57–1)
CRP SERPL-MCNC: 0.23 MG/DL (ref 0–0.5)
DEPRECATED RDW RBC AUTO: 47 FL (ref 37–54)
EOSINOPHIL # BLD AUTO: 0.12 10*3/MM3 (ref 0–0.4)
EOSINOPHIL NFR BLD AUTO: 1.7 % (ref 0.3–6.2)
ERYTHROCYTE [DISTWIDTH] IN BLOOD BY AUTOMATED COUNT: 14.2 % (ref 12.3–15.4)
ERYTHROCYTE [SEDIMENTATION RATE] IN BLOOD: 20 MM/HR (ref 0–30)
GFR SERPL CREATININE-BSD FRML MDRD: 55 ML/MIN/1.73
GLOBULIN UR ELPH-MCNC: 2.6 GM/DL
GLUCOSE BLD-MCNC: 97 MG/DL (ref 65–99)
HCT VFR BLD AUTO: 32.3 % (ref 34–46.6)
HGB BLD-MCNC: 10.7 G/DL (ref 12–15.9)
IMM GRANULOCYTES # BLD AUTO: 0.04 10*3/MM3 (ref 0–0.05)
IMM GRANULOCYTES NFR BLD AUTO: 0.6 % (ref 0–0.5)
LYMPHOCYTES # BLD AUTO: 1.55 10*3/MM3 (ref 0.7–3.1)
LYMPHOCYTES NFR BLD AUTO: 22.4 % (ref 19.6–45.3)
MCH RBC QN AUTO: 31.5 PG (ref 26.6–33)
MCHC RBC AUTO-ENTMCNC: 33.1 G/DL (ref 31.5–35.7)
MCV RBC AUTO: 95 FL (ref 79–97)
MONOCYTES # BLD AUTO: 0.7 10*3/MM3 (ref 0.1–0.9)
MONOCYTES NFR BLD AUTO: 10.1 % (ref 5–12)
NEUTROPHILS # BLD AUTO: 4.47 10*3/MM3 (ref 1.7–7)
NEUTROPHILS NFR BLD AUTO: 64.8 % (ref 42.7–76)
NRBC BLD AUTO-RTO: 0.1 /100 WBC (ref 0–0.2)
PLATELET # BLD AUTO: 178 10*3/MM3 (ref 140–450)
PMV BLD AUTO: 11.2 FL (ref 6–12)
POTASSIUM BLD-SCNC: 4.4 MMOL/L (ref 3.5–5.2)
PROT SERPL-MCNC: 6.5 G/DL (ref 6–8.5)
RBC # BLD AUTO: 3.4 10*6/MM3 (ref 3.77–5.28)
SODIUM BLD-SCNC: 139 MMOL/L (ref 136–145)
WBC NRBC COR # BLD: 6.91 10*3/MM3 (ref 3.4–10.8)

## 2020-05-21 PROCEDURE — 85025 COMPLETE CBC W/AUTO DIFF WBC: CPT | Performed by: NURSE PRACTITIONER

## 2020-05-21 PROCEDURE — 80053 COMPREHEN METABOLIC PANEL: CPT | Performed by: NURSE PRACTITIONER

## 2020-05-21 PROCEDURE — 36415 COLL VENOUS BLD VENIPUNCTURE: CPT

## 2020-05-21 PROCEDURE — 85652 RBC SED RATE AUTOMATED: CPT | Performed by: NURSE PRACTITIONER

## 2020-05-21 PROCEDURE — 86140 C-REACTIVE PROTEIN: CPT | Performed by: NURSE PRACTITIONER

## 2020-06-04 ENCOUNTER — HOSPITAL ENCOUNTER (EMERGENCY)
Facility: HOSPITAL | Age: 85
Discharge: HOME | End: 2020-06-04
Attending: EMERGENCY MEDICINE | Admitting: EMERGENCY MEDICINE

## 2020-06-04 ENCOUNTER — APPOINTMENT (OUTPATIENT)
Dept: CARDIOLOGY | Facility: HOSPITAL | Age: 85
End: 2020-06-04

## 2020-06-04 ENCOUNTER — APPOINTMENT (OUTPATIENT)
Dept: CT IMAGING | Facility: HOSPITAL | Age: 85
End: 2020-06-04

## 2020-06-04 VITALS
TEMPERATURE: 98.1 F | DIASTOLIC BLOOD PRESSURE: 87 MMHG | WEIGHT: 119 LBS | SYSTOLIC BLOOD PRESSURE: 138 MMHG | HEIGHT: 61 IN | RESPIRATION RATE: 18 BRPM | BODY MASS INDEX: 22.47 KG/M2 | HEART RATE: 87 BPM | OXYGEN SATURATION: 98 %

## 2020-06-04 DIAGNOSIS — M79.89 LEG SWELLING: ICD-10-CM

## 2020-06-04 DIAGNOSIS — R51.9 ACUTE NONINTRACTABLE HEADACHE, UNSPECIFIED HEADACHE TYPE: ICD-10-CM

## 2020-06-04 DIAGNOSIS — I10 PRIMARY HYPERTENSION: Primary | ICD-10-CM

## 2020-06-04 LAB
ALBUMIN SERPL-MCNC: 4.3 G/DL (ref 3.5–5.2)
ALBUMIN/GLOB SERPL: 1.4 G/DL
ALP SERPL-CCNC: 67 U/L (ref 39–117)
ALT SERPL W P-5'-P-CCNC: 22 U/L (ref 1–33)
ANION GAP SERPL CALCULATED.3IONS-SCNC: 6.3 MMOL/L (ref 5–15)
AST SERPL-CCNC: 29 U/L (ref 1–32)
BASOPHILS # BLD AUTO: 0.04 10*3/MM3 (ref 0–0.2)
BASOPHILS NFR BLD AUTO: 0.5 % (ref 0–1.5)
BH CV LOWER VASCULAR LEFT COMMON FEMORAL AUGMENT: NORMAL
BH CV LOWER VASCULAR LEFT COMMON FEMORAL COMPETENT: NORMAL
BH CV LOWER VASCULAR LEFT COMMON FEMORAL COMPRESS: NORMAL
BH CV LOWER VASCULAR LEFT COMMON FEMORAL PHASIC: NORMAL
BH CV LOWER VASCULAR LEFT COMMON FEMORAL SPONT: NORMAL
BH CV LOWER VASCULAR LEFT DISTAL FEMORAL COMPRESS: NORMAL
BH CV LOWER VASCULAR LEFT GASTRONEMIUS COMPRESS: NORMAL
BH CV LOWER VASCULAR LEFT GREATER SAPH AK COMPRESS: NORMAL
BH CV LOWER VASCULAR LEFT GREATER SAPH BK COMPRESS: NORMAL
BH CV LOWER VASCULAR LEFT LESSER SAPH COMPRESS: NORMAL
BH CV LOWER VASCULAR LEFT MID FEMORAL AUGMENT: NORMAL
BH CV LOWER VASCULAR LEFT MID FEMORAL COMPETENT: NORMAL
BH CV LOWER VASCULAR LEFT MID FEMORAL COMPRESS: NORMAL
BH CV LOWER VASCULAR LEFT MID FEMORAL PHASIC: NORMAL
BH CV LOWER VASCULAR LEFT MID FEMORAL SPONT: NORMAL
BH CV LOWER VASCULAR LEFT PERONEAL COMPRESS: NORMAL
BH CV LOWER VASCULAR LEFT POPLITEAL AUGMENT: NORMAL
BH CV LOWER VASCULAR LEFT POPLITEAL COMPETENT: NORMAL
BH CV LOWER VASCULAR LEFT POPLITEAL COMPRESS: NORMAL
BH CV LOWER VASCULAR LEFT POPLITEAL PHASIC: NORMAL
BH CV LOWER VASCULAR LEFT POPLITEAL SPONT: NORMAL
BH CV LOWER VASCULAR LEFT POSTERIOR TIBIAL COMPRESS: NORMAL
BH CV LOWER VASCULAR LEFT PROFUNDA FEMORAL COMPRESS: NORMAL
BH CV LOWER VASCULAR LEFT PROXIMAL FEMORAL COMPRESS: NORMAL
BH CV LOWER VASCULAR LEFT SAPHENOFEMORAL JUNCTION COMPRESS: NORMAL
BH CV LOWER VASCULAR RIGHT COMMON FEMORAL AUGMENT: NORMAL
BH CV LOWER VASCULAR RIGHT COMMON FEMORAL COMPETENT: NORMAL
BH CV LOWER VASCULAR RIGHT COMMON FEMORAL COMPRESS: NORMAL
BH CV LOWER VASCULAR RIGHT COMMON FEMORAL PHASIC: NORMAL
BH CV LOWER VASCULAR RIGHT COMMON FEMORAL SPONT: NORMAL
BH CV LOWER VASCULAR RIGHT DISTAL FEMORAL COMPRESS: NORMAL
BH CV LOWER VASCULAR RIGHT GASTRONEMIUS COMPRESS: NORMAL
BH CV LOWER VASCULAR RIGHT GREATER SAPH AK COMPRESS: NORMAL
BH CV LOWER VASCULAR RIGHT GREATER SAPH BK COMPRESS: NORMAL
BH CV LOWER VASCULAR RIGHT LESSER SAPH COMPRESS: NORMAL
BH CV LOWER VASCULAR RIGHT MID FEMORAL AUGMENT: NORMAL
BH CV LOWER VASCULAR RIGHT MID FEMORAL COMPETENT: NORMAL
BH CV LOWER VASCULAR RIGHT MID FEMORAL COMPRESS: NORMAL
BH CV LOWER VASCULAR RIGHT MID FEMORAL PHASIC: NORMAL
BH CV LOWER VASCULAR RIGHT MID FEMORAL SPONT: NORMAL
BH CV LOWER VASCULAR RIGHT PERONEAL COMPRESS: NORMAL
BH CV LOWER VASCULAR RIGHT POPLITEAL AUGMENT: NORMAL
BH CV LOWER VASCULAR RIGHT POPLITEAL COMPETENT: NORMAL
BH CV LOWER VASCULAR RIGHT POPLITEAL COMPRESS: NORMAL
BH CV LOWER VASCULAR RIGHT POPLITEAL PHASIC: NORMAL
BH CV LOWER VASCULAR RIGHT POPLITEAL SPONT: NORMAL
BH CV LOWER VASCULAR RIGHT POSTERIOR TIBIAL COMPRESS: NORMAL
BH CV LOWER VASCULAR RIGHT PROFUNDA FEMORAL COMPRESS: NORMAL
BH CV LOWER VASCULAR RIGHT PROXIMAL FEMORAL COMPRESS: NORMAL
BH CV LOWER VASCULAR RIGHT SAPHENOFEMORAL JUNCTION COMPRESS: NORMAL
BILIRUB SERPL-MCNC: 0.2 MG/DL (ref 0.2–1.2)
BUN BLD-MCNC: 25 MG/DL (ref 8–23)
BUN/CREAT SERPL: 25 (ref 7–25)
CALCIUM SPEC-SCNC: 9.3 MG/DL (ref 8.6–10.5)
CHLORIDE SERPL-SCNC: 104 MMOL/L (ref 98–107)
CO2 SERPL-SCNC: 27.7 MMOL/L (ref 22–29)
CREAT BLD-MCNC: 1 MG/DL (ref 0.57–1)
DEPRECATED RDW RBC AUTO: 49.6 FL (ref 37–54)
EOSINOPHIL # BLD AUTO: 0 10*3/MM3 (ref 0–0.4)
EOSINOPHIL NFR BLD AUTO: 0 % (ref 0.3–6.2)
ERYTHROCYTE [DISTWIDTH] IN BLOOD BY AUTOMATED COUNT: 14.6 % (ref 12.3–15.4)
GFR SERPL CREATININE-BSD FRML MDRD: 53 ML/MIN/1.73
GLOBULIN UR ELPH-MCNC: 3.1 GM/DL
GLUCOSE BLD-MCNC: 109 MG/DL (ref 65–99)
HCT VFR BLD AUTO: 34.3 % (ref 34–46.6)
HGB BLD-MCNC: 11.4 G/DL (ref 12–15.9)
IMM GRANULOCYTES # BLD AUTO: 0.08 10*3/MM3 (ref 0–0.05)
IMM GRANULOCYTES NFR BLD AUTO: 1.1 % (ref 0–0.5)
INR PPP: 0.96 (ref 0.9–1.1)
LYMPHOCYTES # BLD AUTO: 1.66 10*3/MM3 (ref 0.7–3.1)
LYMPHOCYTES NFR BLD AUTO: 21.9 % (ref 19.6–45.3)
MCH RBC QN AUTO: 32.1 PG (ref 26.6–33)
MCHC RBC AUTO-ENTMCNC: 33.2 G/DL (ref 31.5–35.7)
MCV RBC AUTO: 96.6 FL (ref 79–97)
MONOCYTES # BLD AUTO: 0.9 10*3/MM3 (ref 0.1–0.9)
MONOCYTES NFR BLD AUTO: 11.9 % (ref 5–12)
NEUTROPHILS # BLD AUTO: 4.91 10*3/MM3 (ref 1.7–7)
NEUTROPHILS NFR BLD AUTO: 64.6 % (ref 42.7–76)
NRBC BLD AUTO-RTO: 0 /100 WBC (ref 0–0.2)
NT-PROBNP SERPL-MCNC: 442.3 PG/ML (ref 5–1800)
PLATELET # BLD AUTO: 211 10*3/MM3 (ref 140–450)
PMV BLD AUTO: 11.2 FL (ref 6–12)
POTASSIUM BLD-SCNC: 4.4 MMOL/L (ref 3.5–5.2)
PROT SERPL-MCNC: 7.4 G/DL (ref 6–8.5)
PROTHROMBIN TIME: 12.5 SECONDS (ref 11.7–14.2)
RBC # BLD AUTO: 3.55 10*6/MM3 (ref 3.77–5.28)
SODIUM BLD-SCNC: 138 MMOL/L (ref 136–145)
TROPONIN T SERPL-MCNC: <0.01 NG/ML (ref 0–0.03)
WBC NRBC COR # BLD: 7.59 10*3/MM3 (ref 3.4–10.8)

## 2020-06-04 PROCEDURE — 80053 COMPREHEN METABOLIC PANEL: CPT | Performed by: EMERGENCY MEDICINE

## 2020-06-04 PROCEDURE — 85025 COMPLETE CBC W/AUTO DIFF WBC: CPT | Performed by: EMERGENCY MEDICINE

## 2020-06-04 PROCEDURE — 25010000002 FUROSEMIDE PER 20 MG: Performed by: EMERGENCY MEDICINE

## 2020-06-04 PROCEDURE — 70450 CT HEAD/BRAIN W/O DYE: CPT

## 2020-06-04 PROCEDURE — 99284 EMERGENCY DEPT VISIT MOD MDM: CPT

## 2020-06-04 PROCEDURE — 96374 THER/PROPH/DIAG INJ IV PUSH: CPT

## 2020-06-04 PROCEDURE — 93970 EXTREMITY STUDY: CPT

## 2020-06-04 PROCEDURE — 85610 PROTHROMBIN TIME: CPT | Performed by: EMERGENCY MEDICINE

## 2020-06-04 PROCEDURE — 84484 ASSAY OF TROPONIN QUANT: CPT | Performed by: EMERGENCY MEDICINE

## 2020-06-04 PROCEDURE — 83880 ASSAY OF NATRIURETIC PEPTIDE: CPT | Performed by: EMERGENCY MEDICINE

## 2020-06-04 RX ORDER — FUROSEMIDE 10 MG/ML
60 INJECTION INTRAMUSCULAR; INTRAVENOUS ONCE
Status: COMPLETED | OUTPATIENT
Start: 2020-06-04 | End: 2020-06-04

## 2020-06-04 RX ORDER — AMLODIPINE BESYLATE 10 MG/1
10 TABLET ORAL DAILY
Qty: 30 TABLET | Refills: 0 | Status: SHIPPED | OUTPATIENT
Start: 2020-06-04 | End: 2020-07-04

## 2020-06-04 RX ORDER — SODIUM CHLORIDE 0.9 % (FLUSH) 0.9 %
10 SYRINGE (ML) INJECTION AS NEEDED
Status: DISCONTINUED | OUTPATIENT
Start: 2020-06-04 | End: 2020-06-04 | Stop reason: HOSPADM

## 2020-06-04 RX ADMIN — FUROSEMIDE 60 MG: 10 INJECTION, SOLUTION INTRAMUSCULAR; INTRAVENOUS at 17:13

## 2020-06-04 NOTE — ED NOTES
Spoke with family/friend regarding patient status or updates after the 4-digit access code was provided and verified. Family/friend was also informed of enhanced visitor policy, and they verbalized understanding at this time.      Valentin Breen, RN  06/04/20 0874

## 2020-06-04 NOTE — PROGRESS NOTES
Bilateral lower extremity venous doppler completed. Both legs negative for deep and superficial vein thrombosis. Results called to Dr. James Breen in the ED.

## 2020-06-04 NOTE — ED NOTES
Spoke with daughter regarding patient status or updates after the 4-digit access code was provided and verified. Daughter was also informed of enhanced visitor policy, and they verbalized understanding at this time.        Valentin Breen, RN  06/04/20 7822

## 2020-06-04 NOTE — ED NOTES
Pt sent over from urgent care for HA that started a week ago, bp of 180/90, and bilateral lower leg discoloration and swelling that began 3 days ago. Denies CHF or blood clots. No known exposure to COVID-19.    Triage staff wearing mask and glasses. Pt wearing mask.       Emily Griffin, RN  06/04/20 6469

## 2020-06-04 NOTE — ED PROVIDER NOTES
EMERGENCY DEPARTMENT ENCOUNTER    Room Number:  33/33  Date of encounter:  6/4/2020  PCP: Kelsey Prabhakar MD  Historian: Patient      HPI:  Chief Complaint: Headache, leg swelling  A complete HPI/ROS/PMH/PSH/SH/FH are unobtainable due to: None    Context: Verona Avila is a 85 y.o. female who presents to the ED c/o headache and leg swelling.  Patient's headache started a week ago.  It is constant.  Involves her entire head.  It improves with sitting up and worsens with lying flat.  She denies having any associated fever.  Not sudden onset.  No change in vision.  No neck pain.  Patient also notes that she has had lower extremity swelling in her legs for the past 3 days.  No chest pain or shortness of breath.  She is on amlodipine.      PAST MEDICAL HISTORY  Active Ambulatory Problems     Diagnosis Date Noted   • No Active Ambulatory Problems     Resolved Ambulatory Problems     Diagnosis Date Noted   • No Resolved Ambulatory Problems     Past Medical History:   Diagnosis Date   • Arthritis    • Depression    • Disease of thyroid gland    • Hypertension          PAST SURGICAL HISTORY  Past Surgical History:   Procedure Laterality Date   • TONSILLECTOMY           FAMILY HISTORY  History reviewed. No pertinent family history.      SOCIAL HISTORY  Social History     Socioeconomic History   • Marital status:      Spouse name: Not on file   • Number of children: Not on file   • Years of education: Not on file   • Highest education level: Not on file   Tobacco Use   • Smoking status: Never Smoker   • Smokeless tobacco: Never Used   Substance and Sexual Activity   • Alcohol use: No     Frequency: Never   • Drug use: No         ALLERGIES  Patient has no known allergies.        REVIEW OF SYSTEMS  Review of Systems     All systems reviewed and negative except for those discussed in HPI.       PHYSICAL EXAM    I have reviewed the triage vital signs and nursing notes.    ED Triage Vitals   Temp Heart Rate Resp BP SpO2    06/04/20 1240 06/04/20 1240 06/04/20 1240 06/04/20 1248 06/04/20 1240   98.1 °F (36.7 °C) 90 16 (!) 190/89 96 %      Temp src Heart Rate Source Patient Position BP Location FiO2 (%)   06/04/20 1240 06/04/20 1240 -- -- --   Tympanic Monitor          Physical Exam  GENERAL: not distressed  HENT: nares patent  EYES: no scleral icterus  CV: regular rhythm, regular rate, systolic murmur at right upper sternal border  RESPIRATORY: normal effort, clear to auscultation bilaterally  ABDOMEN: soft, nontender  MUSCULOSKELETAL: no deformity, 3+ lower extremity edema bilaterally  NEURO:   Recent and remote memory functions are normal. The patient is attentive with normal concentration. Language is fluent. Speech is clear. The speech is non-dysarthric. Fund of knowledge is normal.   Symmetric smile with no facial droop.  Eyes close shut strongly bilaterally.  Symmetric eyebrow raise bilaterally.  EOMI, PERRL  CN II-XII grossly normal otherwise.  5/5 strength to extremities.  No pronator drift.  Intact FNF.  No meningismus.  SKIN: warm, dry        LAB RESULTS  Recent Results (from the past 24 hour(s))   Comprehensive Metabolic Panel    Collection Time: 06/04/20  1:12 PM   Result Value Ref Range    Glucose 109 (H) 65 - 99 mg/dL    BUN 25 (H) 8 - 23 mg/dL    Creatinine 1.00 0.57 - 1.00 mg/dL    Sodium 138 136 - 145 mmol/L    Potassium 4.4 3.5 - 5.2 mmol/L    Chloride 104 98 - 107 mmol/L    CO2 27.7 22.0 - 29.0 mmol/L    Calcium 9.3 8.6 - 10.5 mg/dL    Total Protein 7.4 6.0 - 8.5 g/dL    Albumin 4.30 3.50 - 5.20 g/dL    ALT (SGPT) 22 1 - 33 U/L    AST (SGOT) 29 1 - 32 U/L    Alkaline Phosphatase 67 39 - 117 U/L    Total Bilirubin 0.2 0.2 - 1.2 mg/dL    eGFR Non African Amer 53 (L) >60 mL/min/1.73    Globulin 3.1 gm/dL    A/G Ratio 1.4 g/dL    BUN/Creatinine Ratio 25.0 7.0 - 25.0    Anion Gap 6.3 5.0 - 15.0 mmol/L   Protime-INR    Collection Time: 06/04/20  1:12 PM   Result Value Ref Range    Protime 12.5 11.7 - 14.2 Seconds    INR  0.96 0.90 - 1.10   BNP    Collection Time: 06/04/20  1:12 PM   Result Value Ref Range    proBNP 442.3 5.0-1,800.0 pg/mL   Troponin    Collection Time: 06/04/20  1:12 PM   Result Value Ref Range    Troponin T <0.010 0.000 - 0.030 ng/mL   CBC Auto Differential    Collection Time: 06/04/20  1:12 PM   Result Value Ref Range    WBC 7.59 3.40 - 10.80 10*3/mm3    RBC 3.55 (L) 3.77 - 5.28 10*6/mm3    Hemoglobin 11.4 (L) 12.0 - 15.9 g/dL    Hematocrit 34.3 34.0 - 46.6 %    MCV 96.6 79.0 - 97.0 fL    MCH 32.1 26.6 - 33.0 pg    MCHC 33.2 31.5 - 35.7 g/dL    RDW 14.6 12.3 - 15.4 %    RDW-SD 49.6 37.0 - 54.0 fl    MPV 11.2 6.0 - 12.0 fL    Platelets 211 140 - 450 10*3/mm3    Neutrophil % 64.6 42.7 - 76.0 %    Lymphocyte % 21.9 19.6 - 45.3 %    Monocyte % 11.9 5.0 - 12.0 %    Eosinophil % 0.0 (L) 0.3 - 6.2 %    Basophil % 0.5 0.0 - 1.5 %    Immature Grans % 1.1 (H) 0.0 - 0.5 %    Neutrophils, Absolute 4.91 1.70 - 7.00 10*3/mm3    Lymphocytes, Absolute 1.66 0.70 - 3.10 10*3/mm3    Monocytes, Absolute 0.90 0.10 - 0.90 10*3/mm3    Eosinophils, Absolute 0.00 0.00 - 0.40 10*3/mm3    Basophils, Absolute 0.04 0.00 - 0.20 10*3/mm3    Immature Grans, Absolute 0.08 (H) 0.00 - 0.05 10*3/mm3    nRBC 0.0 0.0 - 0.2 /100 WBC   Duplex Venous Lower Extremity - Bilateral    Collection Time: 06/04/20  3:56 PM   Result Value Ref Range    Right Common Femoral Spont Y     Right Common Femoral Phasic Y     Right Common Femoral Augment Y     Right Common Femoral Competent Y     Right Common Femoral Compress C     Right Saphenofemoral Junction Compress C     Right Profunda Femoral Compress C     Right Proximal Femoral Compress C     Right Mid Femoral Spont Y     Right Mid Femoral Phasic Y     Right Mid Femoral Augment Y     Right Mid Femoral Competent Y     Right Mid Femoral Compress C     Right Distal Femoral Compress C     Right Popliteal Spont Y     Right Popliteal Phasic Y     Right Popliteal Augment Y     Right Popliteal Competent Y     Right  Popliteal Compress C     Right Posterior Tibial Compress C     Right Peroneal Compress C     Right GastronemiusSoleal Compress C     Right Greater Saph AK Compress C     Right Greater Saph BK Compress C     Right Lesser Saph Compress C     Left Common Femoral Spont Y     Left Common Femoral Phasic Y     Left Common Femoral Augment Y     Left Common Femoral Competent Y     Left Common Femoral Compress C     Left Saphenofemoral Junction Compress C     Left Profunda Femoral Compress C     Left Proximal Femoral Compress C     Left Mid Femoral Spont Y     Left Mid Femoral Phasic Y     Left Mid Femoral Augment Y     Left Mid Femoral Competent Y     Left Mid Femoral Compress C     Left Distal Femoral Compress C     Left Popliteal Spont Y     Left Popliteal Phasic Y     Left Popliteal Augment Y     Left Popliteal Competent Y     Left Popliteal Compress C     Left Posterior Tibial Compress C     Left Peroneal Compress C     Left GastronemiusSoleal Compress C     Left Greater Saph AK Compress C     Left Greater Saph BK Compress C     Left Lesser Saph Compress C        Ordered the above labs and independently reviewed the results.        RADIOLOGY  Ct Head Without Contrast    Result Date: 6/4/2020  EMERGENCY NONCONTRAST HEAD CT 06/04/2020  CLINICAL HISTORY: Patient has had a headache for a week, worse when lying flat.  TECHNIQUE: Spiral CT images were obtained from the base of the skull to the vertex without intravenous contrast. The images were reformatted and submitted in 3 mm thick axial CT sections with brain algorithm and 2 mm thick sagittal and coronal reconstructions were performed and submitted in brain algorithm.  COMPARISON: This is correlated to a prior noncontrast head CT from [facility] on 11/15/2019.  FINDINGS: There are patchy and confluent areas of low-density extending from the periventricular into the subcortical white matter of the cerebral hemispheres consistent with moderate small vessel  disease. 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 The remainder of the brain parenchyma is normal in attenuation. The ventricles are normal in size. I see no focal mass effect. There is no midline shift. No extra-axial fluid collections are identified. There is no evidence of acute intracranial hemorrhage. The paranasal sinuses, mastoid air cells and middle ear cavities are clear. There are calcified plaques in the cavernous internal carotid arteries bilaterally.      1. No significant change when compared to prior head CT from [facility] 11/15/2019 with no acute abnormality seen to account for the patient's headache for a week while lying flat. 2. 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 There is mild cerebral atrophy and the ventricles are mildly prominent in size felt to be due to central volume loss or atrophy. The remainder of the head CT is unremarkable.  Radiation dose reduction techniques were utilized, including automated exposure control and exposure modulation based on body size.         I ordered the above noted radiological studies. Reviewed by me and discussed with radiologist.  See dictation for official radiology interpretation.      PROCEDURES    Procedures      MEDICATIONS GIVEN IN ER    Medications   sodium chloride 0.9 % flush 10 mL (has no administration in time range)   furosemide (LASIX) injection 60 mg (has no administration in time range)          PROGRESS, DATA ANALYSIS, CONSULTS, AND MEDICAL DECISION MAKING    All labs have been independently reviewed by me.  All radiology studies have been reviewed by me and discussed with radiologist dictating the report.   EKG's independently viewed and interpreted by me.  Discussion below represents my analysis of pertinent findings related to patient's condition, differential diagnosis, treatment plan and final disposition.    Differential diagnosis for headache includes but is not limited to:  - subarachnoid hemorrhage  - intracranial mass  - stroke  - RCVS  - meningitis  - glaucoma  - giant cell arteritis  - CO poisoning  - cerebral venous sinus thrombosis  - migraine      Differential for the patient's leg swelling includes medication side effect, CHF, cirrhosis, nephrosis, lymphedema, DVT.    ED Course as of Jun 04 1654   Thu Jun 04, 2020   1406 proBNP: 442.3 [TD]   1638 Creatinine: 1.00 [TD]      ED Course User Index  [TD] James Breen II, MD         I had a convseration with patient's family Tiffanie Narayanan who is APRN.  We discussed management of the patient informed a game plan together.  Patient plans to follow-up with her PCP.    PPE: Both the patient and I wore a surgical mask throughout the entire patient encounter.     AS OF 16:54 VITALS:    BP - 138/87  HR - 90  TEMP - 98.1 °F (36.7 °C) (Tympanic)  O2 SATS - 99%        DIAGNOSIS  Final diagnoses:   Primary hypertension   Acute nonintractable headache, unspecified headache type   Leg swelling         DISPOSITION  DISCHARGE    FOLLOW-UP  Kelsey Prabhakar MD  [address] [facility] [address]  [phone]    Schedule an appointment as soon as possible for a visit on 6/8/2020  to recheck your blood pressure and leg swelling         Medication List      New Prescriptions    amLODIPine 10 MG tablet  Commonly known as:  NORVASC  Take 1 tablet by mouth Daily for 30 days.                   James Breen II, MD  06/06/20 2219

## 2020-06-29 ENCOUNTER — TRANSCRIBE ORDERS (OUTPATIENT)
Dept: ADMINISTRATIVE | Facility: HOSPITAL | Age: 85
End: 2020-06-29

## 2020-06-29 ENCOUNTER — HOSPITAL ENCOUNTER (OUTPATIENT)
Dept: GENERAL RADIOLOGY | Facility: HOSPITAL | Age: 85
Discharge: HOME | End: 2020-06-29
Admitting: INTERNAL MEDICINE

## 2020-06-29 DIAGNOSIS — R06.02 SOB (SHORTNESS OF BREATH): Primary | ICD-10-CM

## 2020-06-29 DIAGNOSIS — R06.02 SOB (SHORTNESS OF BREATH): ICD-10-CM

## 2020-06-29 PROCEDURE — 71046 X-RAY EXAM CHEST 2 VIEWS: CPT

## 2020-08-07 ENCOUNTER — TRANSCRIBE ORDERS (OUTPATIENT)
Dept: ADMINISTRATIVE | Facility: HOSPITAL | Age: 85
End: 2020-08-07

## 2020-08-07 DIAGNOSIS — M48.062 SPINAL STENOSIS, LUMBAR REGION, WITH NEUROGENIC CLAUDICATION: Primary | ICD-10-CM

## 2020-08-24 ENCOUNTER — LAB (OUTPATIENT)
Dept: LAB | Facility: HOSPITAL | Age: 85
End: 2020-08-24

## 2020-08-24 ENCOUNTER — HOSPITAL ENCOUNTER (OUTPATIENT)
Dept: MRI IMAGING | Facility: HOSPITAL | Age: 85
Discharge: HOME | End: 2020-08-24
Admitting: PAIN MEDICINE

## 2020-08-24 ENCOUNTER — TRANSCRIBE ORDERS (OUTPATIENT)
Dept: ADMINISTRATIVE | Facility: HOSPITAL | Age: 85
End: 2020-08-24

## 2020-08-24 DIAGNOSIS — M05.89 OTHER RHEUMATOID ARTHRITIS WITH RHEUMATOID FACTOR OF MULTIPLE SITES (HCC): Primary | ICD-10-CM

## 2020-08-24 DIAGNOSIS — M05.89 OTHER RHEUMATOID ARTHRITIS WITH RHEUMATOID FACTOR OF MULTIPLE SITES (HCC): ICD-10-CM

## 2020-08-24 DIAGNOSIS — M48.062 SPINAL STENOSIS, LUMBAR REGION, WITH NEUROGENIC CLAUDICATION: ICD-10-CM

## 2020-08-24 LAB
ALBUMIN SERPL-MCNC: 3.7 G/DL (ref 3.5–5.2)
ALBUMIN/GLOB SERPL: 1.2 G/DL
ALP SERPL-CCNC: 67 U/L (ref 39–117)
ALT SERPL W P-5'-P-CCNC: 19 U/L (ref 1–33)
ANION GAP SERPL CALCULATED.3IONS-SCNC: 7.6 MMOL/L (ref 5–15)
AST SERPL-CCNC: 28 U/L (ref 1–32)
BASOPHILS # BLD AUTO: 0.04 10*3/MM3 (ref 0–0.2)
BASOPHILS NFR BLD AUTO: 0.6 % (ref 0–1.5)
BILIRUB SERPL-MCNC: 0.2 MG/DL (ref 0–1.2)
BUN SERPL-MCNC: 27 MG/DL (ref 8–23)
BUN/CREAT SERPL: 25.5 (ref 7–25)
CALCIUM SPEC-SCNC: 9.1 MG/DL (ref 8.6–10.5)
CHLORIDE SERPL-SCNC: 103 MMOL/L (ref 98–107)
CO2 SERPL-SCNC: 26.4 MMOL/L (ref 22–29)
CREAT SERPL-MCNC: 1.06 MG/DL (ref 0.57–1)
CRP SERPL-MCNC: 2.07 MG/DL (ref 0–0.5)
DEPRECATED RDW RBC AUTO: 53.8 FL (ref 37–54)
EOSINOPHIL # BLD AUTO: 0.07 10*3/MM3 (ref 0–0.4)
EOSINOPHIL NFR BLD AUTO: 1 % (ref 0.3–6.2)
ERYTHROCYTE [DISTWIDTH] IN BLOOD BY AUTOMATED COUNT: 14.9 % (ref 12.3–15.4)
GFR SERPL CREATININE-BSD FRML MDRD: 49 ML/MIN/1.73
GLOBULIN UR ELPH-MCNC: 3 GM/DL
GLUCOSE SERPL-MCNC: 96 MG/DL (ref 65–99)
HCT VFR BLD AUTO: 33.1 % (ref 34–46.6)
HGB BLD-MCNC: 10.5 G/DL (ref 12–15.9)
IMM GRANULOCYTES # BLD AUTO: 0.05 10*3/MM3 (ref 0–0.05)
IMM GRANULOCYTES NFR BLD AUTO: 0.7 % (ref 0–0.5)
LYMPHOCYTES # BLD AUTO: 1.22 10*3/MM3 (ref 0.7–3.1)
LYMPHOCYTES NFR BLD AUTO: 18 % (ref 19.6–45.3)
MCH RBC QN AUTO: 31.7 PG (ref 26.6–33)
MCHC RBC AUTO-ENTMCNC: 31.7 G/DL (ref 31.5–35.7)
MCV RBC AUTO: 100 FL (ref 79–97)
MONOCYTES # BLD AUTO: 0.46 10*3/MM3 (ref 0.1–0.9)
MONOCYTES NFR BLD AUTO: 6.8 % (ref 5–12)
NEUTROPHILS NFR BLD AUTO: 4.95 10*3/MM3 (ref 1.7–7)
NEUTROPHILS NFR BLD AUTO: 72.9 % (ref 42.7–76)
NRBC BLD AUTO-RTO: 0 /100 WBC (ref 0–0.2)
PLATELET # BLD AUTO: 258 10*3/MM3 (ref 140–450)
PMV BLD AUTO: 11.2 FL (ref 6–12)
POTASSIUM SERPL-SCNC: 4.7 MMOL/L (ref 3.5–5.2)
PROT SERPL-MCNC: 6.7 G/DL (ref 6–8.5)
RBC # BLD AUTO: 3.31 10*6/MM3 (ref 3.77–5.28)
SODIUM SERPL-SCNC: 137 MMOL/L (ref 136–145)
WBC # BLD AUTO: 6.79 10*3/MM3 (ref 3.4–10.8)

## 2020-08-24 PROCEDURE — 80053 COMPREHEN METABOLIC PANEL: CPT | Performed by: NURSE PRACTITIONER

## 2020-08-24 PROCEDURE — 72148 MRI LUMBAR SPINE W/O DYE: CPT

## 2020-08-24 PROCEDURE — 36415 COLL VENOUS BLD VENIPUNCTURE: CPT

## 2020-08-24 PROCEDURE — 86140 C-REACTIVE PROTEIN: CPT | Performed by: NURSE PRACTITIONER

## 2020-08-24 PROCEDURE — 85025 COMPLETE CBC W/AUTO DIFF WBC: CPT | Performed by: NURSE PRACTITIONER

## 2020-12-03 ENCOUNTER — TRANSCRIBE ORDERS (OUTPATIENT)
Dept: ADMINISTRATIVE | Facility: HOSPITAL | Age: 85
End: 2020-12-03

## 2020-12-03 ENCOUNTER — LAB (OUTPATIENT)
Dept: LAB | Facility: HOSPITAL | Age: 85
End: 2020-12-03

## 2020-12-03 DIAGNOSIS — M05.849: Primary | ICD-10-CM

## 2020-12-03 DIAGNOSIS — M05.849: ICD-10-CM

## 2020-12-03 LAB
ALBUMIN SERPL-MCNC: 3.6 G/DL (ref 3.5–5.2)
ALBUMIN/GLOB SERPL: 1 G/DL
ALP SERPL-CCNC: 68 U/L (ref 39–117)
ALT SERPL W P-5'-P-CCNC: 20 U/L (ref 1–33)
ANION GAP SERPL CALCULATED.3IONS-SCNC: 10.7 MMOL/L (ref 5–15)
AST SERPL-CCNC: 30 U/L (ref 1–32)
BASOPHILS # BLD AUTO: 0.05 10*3/MM3 (ref 0–0.2)
BASOPHILS NFR BLD AUTO: 0.6 % (ref 0–1.5)
BILIRUB SERPL-MCNC: 0.2 MG/DL (ref 0–1.2)
BUN SERPL-MCNC: 24 MG/DL (ref 8–23)
BUN/CREAT SERPL: 20.7 (ref 7–25)
CALCIUM SPEC-SCNC: 9.2 MG/DL (ref 8.6–10.5)
CHLORIDE SERPL-SCNC: 103 MMOL/L (ref 98–107)
CO2 SERPL-SCNC: 24.3 MMOL/L (ref 22–29)
CREAT SERPL-MCNC: 1.16 MG/DL (ref 0.57–1)
CRP SERPL-MCNC: 0.37 MG/DL (ref 0–0.5)
DEPRECATED RDW RBC AUTO: 51.2 FL (ref 37–54)
EOSINOPHIL # BLD AUTO: 0.15 10*3/MM3 (ref 0–0.4)
EOSINOPHIL NFR BLD AUTO: 1.9 % (ref 0.3–6.2)
ERYTHROCYTE [DISTWIDTH] IN BLOOD BY AUTOMATED COUNT: 14.9 % (ref 12.3–15.4)
GFR SERPL CREATININE-BSD FRML MDRD: 44 ML/MIN/1.73
GLOBULIN UR ELPH-MCNC: 3.5 GM/DL
GLUCOSE SERPL-MCNC: 85 MG/DL (ref 65–99)
HCT VFR BLD AUTO: 33.7 % (ref 34–46.6)
HGB BLD-MCNC: 10.9 G/DL (ref 12–15.9)
IMM GRANULOCYTES # BLD AUTO: 0.06 10*3/MM3 (ref 0–0.05)
IMM GRANULOCYTES NFR BLD AUTO: 0.8 % (ref 0–0.5)
LYMPHOCYTES # BLD AUTO: 1.92 10*3/MM3 (ref 0.7–3.1)
LYMPHOCYTES NFR BLD AUTO: 24.3 % (ref 19.6–45.3)
MCH RBC QN AUTO: 31.5 PG (ref 26.6–33)
MCHC RBC AUTO-ENTMCNC: 32.3 G/DL (ref 31.5–35.7)
MCV RBC AUTO: 97.4 FL (ref 79–97)
MONOCYTES # BLD AUTO: 0.99 10*3/MM3 (ref 0.1–0.9)
MONOCYTES NFR BLD AUTO: 12.5 % (ref 5–12)
NEUTROPHILS NFR BLD AUTO: 4.72 10*3/MM3 (ref 1.7–7)
NEUTROPHILS NFR BLD AUTO: 59.9 % (ref 42.7–76)
NRBC BLD AUTO-RTO: 0 /100 WBC (ref 0–0.2)
PLATELET # BLD AUTO: 225 10*3/MM3 (ref 140–450)
PMV BLD AUTO: 11.5 FL (ref 6–12)
POTASSIUM SERPL-SCNC: 4.5 MMOL/L (ref 3.5–5.2)
PROT SERPL-MCNC: 7.1 G/DL (ref 6–8.5)
RBC # BLD AUTO: 3.46 10*6/MM3 (ref 3.77–5.28)
SODIUM SERPL-SCNC: 138 MMOL/L (ref 136–145)
WBC # BLD AUTO: 7.89 10*3/MM3 (ref 3.4–10.8)

## 2020-12-03 PROCEDURE — 86140 C-REACTIVE PROTEIN: CPT

## 2020-12-03 PROCEDURE — 36415 COLL VENOUS BLD VENIPUNCTURE: CPT

## 2020-12-03 PROCEDURE — 80053 COMPREHEN METABOLIC PANEL: CPT

## 2020-12-03 PROCEDURE — 85025 COMPLETE CBC W/AUTO DIFF WBC: CPT

## 2021-11-18 ENCOUNTER — APPOINTMENT (OUTPATIENT)
Dept: CT IMAGING | Facility: HOSPITAL | Age: 86
End: 2021-11-18

## 2021-11-18 ENCOUNTER — APPOINTMENT (OUTPATIENT)
Dept: GENERAL RADIOLOGY | Facility: HOSPITAL | Age: 86
End: 2021-11-18

## 2021-11-18 ENCOUNTER — HOSPITAL ENCOUNTER (INPATIENT)
Facility: HOSPITAL | Age: 86
LOS: 1 days | Discharge: HOME | End: 2021-11-20
Attending: EMERGENCY MEDICINE | Admitting: HOSPITALIST

## 2021-11-18 DIAGNOSIS — R05.9 COUGH: ICD-10-CM

## 2021-11-18 DIAGNOSIS — E05.90 HYPERTHYROIDISM: ICD-10-CM

## 2021-11-18 DIAGNOSIS — R55 SYNCOPE, UNSPECIFIED SYNCOPE TYPE: Primary | ICD-10-CM

## 2021-11-18 DIAGNOSIS — R77.8 ELEVATED TROPONIN: ICD-10-CM

## 2021-11-18 DIAGNOSIS — R94.31 ABNORMAL EKG: ICD-10-CM

## 2021-11-18 DIAGNOSIS — D72.825 BANDEMIA: ICD-10-CM

## 2021-11-18 LAB
ALBUMIN SERPL-MCNC: 3.6 G/DL (ref 3.5–5.2)
ALBUMIN/GLOB SERPL: 0.9 G/DL
ALP SERPL-CCNC: 113 U/L (ref 39–117)
ALT SERPL W P-5'-P-CCNC: 18 U/L (ref 1–33)
ANION GAP SERPL CALCULATED.3IONS-SCNC: 11.9 MMOL/L (ref 5–15)
AST SERPL-CCNC: 46 U/L (ref 1–32)
BASOPHILS # BLD AUTO: 0.05 10*3/MM3 (ref 0–0.2)
BASOPHILS NFR BLD AUTO: 0.3 % (ref 0–1.5)
BILIRUB SERPL-MCNC: 0.4 MG/DL (ref 0–1.2)
BILIRUB UR QL STRIP: NEGATIVE
BUN SERPL-MCNC: 14 MG/DL (ref 8–23)
BUN/CREAT SERPL: 14 (ref 7–25)
CALCIUM SPEC-SCNC: 9.5 MG/DL (ref 8.6–10.5)
CHLORIDE SERPL-SCNC: 99 MMOL/L (ref 98–107)
CLARITY UR: CLEAR
CO2 SERPL-SCNC: 25.1 MMOL/L (ref 22–29)
COLOR UR: YELLOW
CREAT SERPL-MCNC: 1 MG/DL (ref 0.57–1)
D-LACTATE SERPL-SCNC: 1.6 MMOL/L (ref 0.5–2)
DEPRECATED RDW RBC AUTO: 44 FL (ref 37–54)
EOSINOPHIL # BLD AUTO: 0.06 10*3/MM3 (ref 0–0.4)
EOSINOPHIL NFR BLD AUTO: 0.4 % (ref 0.3–6.2)
ERYTHROCYTE [DISTWIDTH] IN BLOOD BY AUTOMATED COUNT: 13.8 % (ref 12.3–15.4)
GFR SERPL CREATININE-BSD FRML MDRD: 52 ML/MIN/1.73
GLOBULIN UR ELPH-MCNC: 4.1 GM/DL
GLUCOSE SERPL-MCNC: 132 MG/DL (ref 65–99)
GLUCOSE UR STRIP-MCNC: NEGATIVE MG/DL
HCT VFR BLD AUTO: 36.5 % (ref 34–46.6)
HGB BLD-MCNC: 12.2 G/DL (ref 12–15.9)
HGB UR QL STRIP.AUTO: NEGATIVE
HOLD SPECIMEN: NORMAL
HOLD SPECIMEN: NORMAL
IMM GRANULOCYTES # BLD AUTO: 0.25 10*3/MM3 (ref 0–0.05)
IMM GRANULOCYTES NFR BLD AUTO: 1.6 % (ref 0–0.5)
KETONES UR QL STRIP: ABNORMAL
LEUKOCYTE ESTERASE UR QL STRIP.AUTO: NEGATIVE
LYMPHOCYTES # BLD AUTO: 1.52 10*3/MM3 (ref 0.7–3.1)
LYMPHOCYTES NFR BLD AUTO: 9.8 % (ref 19.6–45.3)
MAGNESIUM SERPL-MCNC: 1.8 MG/DL (ref 1.6–2.4)
MCH RBC QN AUTO: 30.1 PG (ref 26.6–33)
MCHC RBC AUTO-ENTMCNC: 33.4 G/DL (ref 31.5–35.7)
MCV RBC AUTO: 90.1 FL (ref 79–97)
MONOCYTES # BLD AUTO: 1.28 10*3/MM3 (ref 0.1–0.9)
MONOCYTES NFR BLD AUTO: 8.2 % (ref 5–12)
NEUTROPHILS NFR BLD AUTO: 12.36 10*3/MM3 (ref 1.7–7)
NEUTROPHILS NFR BLD AUTO: 79.7 % (ref 42.7–76)
NITRITE UR QL STRIP: NEGATIVE
NRBC BLD AUTO-RTO: 0 /100 WBC (ref 0–0.2)
PH UR STRIP.AUTO: 5.5 [PH] (ref 5–8)
PLATELET # BLD AUTO: 477 10*3/MM3 (ref 140–450)
PMV BLD AUTO: 10.2 FL (ref 6–12)
POTASSIUM SERPL-SCNC: 3.5 MMOL/L (ref 3.5–5.2)
PROCALCITONIN SERPL-MCNC: 0.16 NG/ML (ref 0–0.25)
PROT SERPL-MCNC: 7.7 G/DL (ref 6–8.5)
PROT UR QL STRIP: ABNORMAL
QT INTERVAL: 415 MS
RBC # BLD AUTO: 4.05 10*6/MM3 (ref 3.77–5.28)
SARS-COV-2 RNA PNL SPEC NAA+PROBE: NOT DETECTED
SODIUM SERPL-SCNC: 136 MMOL/L (ref 136–145)
SP GR UR STRIP: 1.01 (ref 1–1.03)
T4 FREE SERPL-MCNC: 1.83 NG/DL (ref 0.93–1.7)
TROPONIN T SERPL-MCNC: 0.07 NG/ML (ref 0–0.03)
TROPONIN T SERPL-MCNC: 0.09 NG/ML (ref 0–0.03)
TSH SERPL DL<=0.05 MIU/L-ACNC: 0.14 UIU/ML (ref 0.27–4.2)
UROBILINOGEN UR QL STRIP: ABNORMAL
WBC NRBC COR # BLD: 15.52 10*3/MM3 (ref 3.4–10.8)
WHOLE BLOOD HOLD SPECIMEN: NORMAL
WHOLE BLOOD HOLD SPECIMEN: NORMAL

## 2021-11-18 PROCEDURE — G0378 HOSPITAL OBSERVATION PER HR: HCPCS

## 2021-11-18 PROCEDURE — 70498 CT ANGIOGRAPHY NECK: CPT

## 2021-11-18 PROCEDURE — 83735 ASSAY OF MAGNESIUM: CPT | Performed by: EMERGENCY MEDICINE

## 2021-11-18 PROCEDURE — 94799 UNLISTED PULMONARY SVC/PX: CPT

## 2021-11-18 PROCEDURE — 85025 COMPLETE CBC W/AUTO DIFF WBC: CPT | Performed by: EMERGENCY MEDICINE

## 2021-11-18 PROCEDURE — 84145 PROCALCITONIN (PCT): CPT | Performed by: EMERGENCY MEDICINE

## 2021-11-18 PROCEDURE — 70496 CT ANGIOGRAPHY HEAD: CPT

## 2021-11-18 PROCEDURE — 0042T HC CT CEREBRAL PERFUSION W/WO CONTRAST: CPT

## 2021-11-18 PROCEDURE — 93005 ELECTROCARDIOGRAM TRACING: CPT | Performed by: EMERGENCY MEDICINE

## 2021-11-18 PROCEDURE — 93010 ELECTROCARDIOGRAM REPORT: CPT | Performed by: INTERNAL MEDICINE

## 2021-11-18 PROCEDURE — 84443 ASSAY THYROID STIM HORMONE: CPT | Performed by: EMERGENCY MEDICINE

## 2021-11-18 PROCEDURE — 99284 EMERGENCY DEPT VISIT MOD MDM: CPT

## 2021-11-18 PROCEDURE — 87635 SARS-COV-2 COVID-19 AMP PRB: CPT | Performed by: EMERGENCY MEDICINE

## 2021-11-18 PROCEDURE — 80053 COMPREHEN METABOLIC PANEL: CPT | Performed by: EMERGENCY MEDICINE

## 2021-11-18 PROCEDURE — 71045 X-RAY EXAM CHEST 1 VIEW: CPT

## 2021-11-18 PROCEDURE — 81003 URINALYSIS AUTO W/O SCOPE: CPT | Performed by: EMERGENCY MEDICINE

## 2021-11-18 PROCEDURE — 84484 ASSAY OF TROPONIN QUANT: CPT | Performed by: EMERGENCY MEDICINE

## 2021-11-18 PROCEDURE — 87040 BLOOD CULTURE FOR BACTERIA: CPT | Performed by: EMERGENCY MEDICINE

## 2021-11-18 PROCEDURE — 83605 ASSAY OF LACTIC ACID: CPT | Performed by: EMERGENCY MEDICINE

## 2021-11-18 PROCEDURE — 84439 ASSAY OF FREE THYROXINE: CPT | Performed by: EMERGENCY MEDICINE

## 2021-11-18 PROCEDURE — 70450 CT HEAD/BRAIN W/O DYE: CPT

## 2021-11-18 RX ORDER — SODIUM CHLORIDE 0.9 % (FLUSH) 0.9 %
10 SYRINGE (ML) INJECTION AS NEEDED
Status: DISCONTINUED | OUTPATIENT
Start: 2021-11-18 | End: 2021-11-20 | Stop reason: HOSPADM

## 2021-11-18 RX ORDER — ATORVASTATIN CALCIUM 20 MG/1
20 TABLET, FILM COATED ORAL DAILY
Status: DISCONTINUED | OUTPATIENT
Start: 2021-11-18 | End: 2021-11-18

## 2021-11-18 RX ORDER — SODIUM CHLORIDE 9 MG/ML
50 INJECTION, SOLUTION INTRAVENOUS CONTINUOUS
Status: DISCONTINUED | OUTPATIENT
Start: 2021-11-18 | End: 2021-11-20

## 2021-11-18 RX ORDER — PANTOPRAZOLE SODIUM 40 MG/1
40 TABLET, DELAYED RELEASE ORAL
Status: DISCONTINUED | OUTPATIENT
Start: 2021-11-19 | End: 2021-11-20 | Stop reason: HOSPADM

## 2021-11-18 RX ORDER — SODIUM CHLORIDE 0.9 % (FLUSH) 0.9 %
10 SYRINGE (ML) INJECTION EVERY 12 HOURS SCHEDULED
Status: DISCONTINUED | OUTPATIENT
Start: 2021-11-18 | End: 2021-11-20 | Stop reason: HOSPADM

## 2021-11-18 RX ORDER — ATORVASTATIN CALCIUM 20 MG/1
40 TABLET, FILM COATED ORAL NIGHTLY
Status: DISCONTINUED | OUTPATIENT
Start: 2021-11-19 | End: 2021-11-19

## 2021-11-18 RX ORDER — ASPIRIN 81 MG/1
81 TABLET, CHEWABLE ORAL DAILY
Status: DISCONTINUED | OUTPATIENT
Start: 2021-11-18 | End: 2021-11-20 | Stop reason: HOSPADM

## 2021-11-18 RX ORDER — LEVOTHYROXINE SODIUM 0.03 MG/1
25 TABLET ORAL DAILY
Status: DISCONTINUED | OUTPATIENT
Start: 2021-11-18 | End: 2021-11-18

## 2021-11-18 RX ORDER — PAROXETINE HYDROCHLORIDE 20 MG/1
20 TABLET, FILM COATED ORAL NIGHTLY
Status: DISCONTINUED | OUTPATIENT
Start: 2021-11-18 | End: 2021-11-20 | Stop reason: HOSPADM

## 2021-11-18 RX ORDER — HYDROCODONE BITARTRATE AND ACETAMINOPHEN 5; 325 MG/1; MG/1
1 TABLET ORAL EVERY 4 HOURS PRN
Status: DISCONTINUED | OUTPATIENT
Start: 2021-11-18 | End: 2021-11-20 | Stop reason: HOSPADM

## 2021-11-18 RX ORDER — ATORVASTATIN CALCIUM 10 MG/1
10 TABLET, FILM COATED ORAL NIGHTLY
Status: DISCONTINUED | OUTPATIENT
Start: 2021-11-19 | End: 2021-11-18

## 2021-11-18 RX ORDER — HYDROXYCHLOROQUINE SULFATE 200 MG/1
200 TABLET, FILM COATED ORAL
Status: DISCONTINUED | OUTPATIENT
Start: 2021-11-19 | End: 2021-11-20 | Stop reason: HOSPADM

## 2021-11-18 RX ORDER — LOSARTAN POTASSIUM 100 MG/1
100 TABLET ORAL
Status: DISCONTINUED | OUTPATIENT
Start: 2021-11-18 | End: 2021-11-19

## 2021-11-18 RX ORDER — LEVOTHYROXINE SODIUM 0.05 MG/1
50 TABLET ORAL DAILY
Status: DISCONTINUED | OUTPATIENT
Start: 2021-11-18 | End: 2021-11-18

## 2021-11-18 RX ADMIN — IOPAMIDOL 150 ML: 755 INJECTION, SOLUTION INTRAVENOUS at 23:50

## 2021-11-18 NOTE — ED PROVIDER NOTES
EMERGENCY DEPARTMENT ENCOUNTER  Patient was placed in face mask in first look and the following protective measures were taken unless  documented below in the ED course. Patient was wearing facemask when I entered the room and throughout our encounter. I wore full protective equipment throughout this patient encounter including a N95 mask, eye shield, gown and gloves. Hand hygiene was performed before donning protective equipment and after removal when leaving the room.    Room Number:  28/28  Date of encounter:  11/18/2021  PCP: Kelsey Prabhakar MD    HPI:  Context: Verona Avila is a 87 y.o. female who presents to the ED c/o chief complaint of syncope.  Patient reports that she woke up on the floor in her house, was unsure how she got there.  Patient reports that she was there all night into the morning, was unable stand secondary to weakness.  Patient reports that she normally ambulates with a walker.  Patient denied any injury, denies any pain.  Patient reports that she has been feeling poorly before hand but is unable to say any of the events that led to her waking up on the floor but she is adamant that she passed out.  Patient denies any chest pain or shortness of breath, no palpitations.  Patient does not believe that she struck her head, denies any headache, no confusion, no visual disturbances, no facial droop, no weakness or numbness.  Patient denies any recent vomiting or diarrhea.  Patient has had a cough for the last 4 weeks, productive in nature, cough unchanging.  Patient endorses some runny nose and congestion, no purulent nasal discharge or sinus pressure, no sore throat.  Patient denies any loss of sense of smell or taste, no vomiting or diarrhea, no fever shakes chills or night sweats.  Patient denies any recent sick contacts, has been vaccinated against COVID-19.  Patient denies any history of syncope in the past, denies any cardiac history.    MEDICAL HISTORY REVIEW  Reviewed in EPIC    PAST  MEDICAL HISTORY  Active Ambulatory Problems     Diagnosis Date Noted   • No Active Ambulatory Problems     Resolved Ambulatory Problems     Diagnosis Date Noted   • No Resolved Ambulatory Problems     Past Medical History:   Diagnosis Date   • Arthritis    • Depression    • Disease of thyroid gland    • Hypertension        PAST SURGICAL HISTORY  Past Surgical History:   Procedure Laterality Date   • THYROID SURGERY     • TONSILLECTOMY         FAMILY HISTORY  History reviewed. No pertinent family history.    SOCIAL HISTORY  Social History     Socioeconomic History   • Marital status:    Tobacco Use   • Smoking status: Former Smoker   • Smokeless tobacco: Never Used   Substance and Sexual Activity   • Alcohol use: No   • Drug use: No       ALLERGIES  Patient has no known allergies.    The patient's allergies have been reviewed    REVIEW OF SYSTEMS  All systems reviewed and negative except for those discussed in HPI.     PHYSICAL EXAM  I have reviewed the triage vital signs and nursing notes.  ED Triage Vitals   Temp Heart Rate Resp BP SpO2   11/18/21 1246 11/18/21 1249 11/18/21 1249 11/18/21 1249 11/18/21 1251   97.1 °F (36.2 °C) 90 16 113/62 94 %      Temp src Heart Rate Source Patient Position BP Location FiO2 (%)   -- -- -- -- --              General: No acute distress.  HENT: NCAT, PERRL, Nares patent.  Eyes: no scleral icterus.  Neck: trachea midline, no ROM limitations.  CV: regular rhythm, regular rate.  Respiratory: normal effort, CTAB.  Abdomen: soft, nondistended, NTTP, no rebound tenderness, no guarding or rigidity.  : deferred.  Musculoskeletal: no deformity.  Neuro: alert, moves all extremities, follows commands.  Skin: warm, dry.    LAB RESULTS  Recent Results (from the past 24 hour(s))   Comprehensive Metabolic Panel    Collection Time: 11/18/21  2:10 PM    Specimen: Blood   Result Value Ref Range    Glucose 132 (H) 65 - 99 mg/dL    BUN 14 8 - 23 mg/dL    Creatinine 1.00 0.57 - 1.00 mg/dL     Sodium 136 136 - 145 mmol/L    Potassium 3.5 3.5 - 5.2 mmol/L    Chloride 99 98 - 107 mmol/L    CO2 25.1 22.0 - 29.0 mmol/L    Calcium 9.5 8.6 - 10.5 mg/dL    Total Protein 7.7 6.0 - 8.5 g/dL    Albumin 3.60 3.50 - 5.20 g/dL    ALT (SGPT) 18 1 - 33 U/L    AST (SGOT) 46 (H) 1 - 32 U/L    Alkaline Phosphatase 113 39 - 117 U/L    Total Bilirubin 0.4 0.0 - 1.2 mg/dL    eGFR Non African Amer 52 (L) >60 mL/min/1.73    Globulin 4.1 gm/dL    A/G Ratio 0.9 g/dL    BUN/Creatinine Ratio 14.0 7.0 - 25.0    Anion Gap 11.9 5.0 - 15.0 mmol/L   Troponin    Collection Time: 11/18/21  2:10 PM    Specimen: Blood   Result Value Ref Range    Troponin T 0.085 (C) 0.000 - 0.030 ng/mL   Magnesium    Collection Time: 11/18/21  2:10 PM    Specimen: Blood   Result Value Ref Range    Magnesium 1.8 1.6 - 2.4 mg/dL   Green Top (Gel)    Collection Time: 11/18/21  2:10 PM   Result Value Ref Range    Extra Tube Hold for add-ons.    Lavender Top    Collection Time: 11/18/21  2:10 PM   Result Value Ref Range    Extra Tube hold for add-on    Gold Top - SST    Collection Time: 11/18/21  2:10 PM   Result Value Ref Range    Extra Tube Hold for add-ons.    Light Blue Top    Collection Time: 11/18/21  2:10 PM   Result Value Ref Range    Extra Tube hold for add-on    CBC Auto Differential    Collection Time: 11/18/21  2:10 PM    Specimen: Blood   Result Value Ref Range    WBC 15.52 (H) 3.40 - 10.80 10*3/mm3    RBC 4.05 3.77 - 5.28 10*6/mm3    Hemoglobin 12.2 12.0 - 15.9 g/dL    Hematocrit 36.5 34.0 - 46.6 %    MCV 90.1 79.0 - 97.0 fL    MCH 30.1 26.6 - 33.0 pg    MCHC 33.4 31.5 - 35.7 g/dL    RDW 13.8 12.3 - 15.4 %    RDW-SD 44.0 37.0 - 54.0 fl    MPV 10.2 6.0 - 12.0 fL    Platelets 477 (H) 140 - 450 10*3/mm3    Neutrophil % 79.7 (H) 42.7 - 76.0 %    Lymphocyte % 9.8 (L) 19.6 - 45.3 %    Monocyte % 8.2 5.0 - 12.0 %    Eosinophil % 0.4 0.3 - 6.2 %    Basophil % 0.3 0.0 - 1.5 %    Immature Grans % 1.6 (H) 0.0 - 0.5 %    Neutrophils, Absolute 12.36 (H) 1.70 -  7.00 10*3/mm3    Lymphocytes, Absolute 1.52 0.70 - 3.10 10*3/mm3    Monocytes, Absolute 1.28 (H) 0.10 - 0.90 10*3/mm3    Eosinophils, Absolute 0.06 0.00 - 0.40 10*3/mm3    Basophils, Absolute 0.05 0.00 - 0.20 10*3/mm3    Immature Grans, Absolute 0.25 (H) 0.00 - 0.05 10*3/mm3    nRBC 0.0 0.0 - 0.2 /100 WBC   Procalcitonin    Collection Time: 11/18/21  2:10 PM    Specimen: Blood   Result Value Ref Range    Procalcitonin 0.16 0.00 - 0.25 ng/mL   TSH    Collection Time: 11/18/21  2:10 PM    Specimen: Blood   Result Value Ref Range    TSH 0.141 (L) 0.270 - 4.200 uIU/mL   T4, Free    Collection Time: 11/18/21  2:10 PM    Specimen: Blood   Result Value Ref Range    Free T4 1.83 (H) 0.93 - 1.70 ng/dL   ECG 12 Lead    Collection Time: 11/18/21  2:14 PM   Result Value Ref Range    QT Interval 415 ms   Urinalysis With Microscopic If Indicated (No Culture) - Urine, Catheter    Collection Time: 11/18/21  2:24 PM    Specimen: Urine, Catheter   Result Value Ref Range    Color, UA Yellow Yellow, Straw    Appearance, UA Clear Clear    pH, UA 5.5 5.0 - 8.0    Specific Gravity, UA 1.014 1.005 - 1.030    Glucose, UA Negative Negative    Ketones, UA Trace (A) Negative    Bilirubin, UA Negative Negative    Blood, UA Negative Negative    Protein, UA Trace (A) Negative    Leuk Esterase, UA Negative Negative    Nitrite, UA Negative Negative    Urobilinogen, UA 1.0 E.U./dL 0.2 - 1.0 E.U./dL   Lactic Acid, Plasma    Collection Time: 11/18/21  3:15 PM    Specimen: Blood   Result Value Ref Range    Lactate 1.6 0.5 - 2.0 mmol/L   COVID-19,[facility] IN-HOUSE CEPHEID/SAMUEL NP SWAB IN TRANSPORT MEDIA 8-12 HR TAT - Swab, Nasopharynx    Collection Time: 11/18/21  3:15 PM    Specimen: Nasopharynx; Swab   Result Value Ref Range    COVID19 Not Detected Not Detected - Ref. Range   Troponin    Collection Time: 11/18/21  3:15 PM    Specimen: Blood   Result Value Ref Range    Troponin T 0.068 (C) 0.000 - 0.030 ng/mL       I ordered the above labs and reviewed  the results.    RADIOLOGY  CT Head Without Contrast    Result Date: 11/18/2021  CT OF THE BRAIN WITHOUT CONTRAST 11/18/2021  HISTORY: Fell, headache, weakness.  TECHNIQUE: Axial images were obtained through the brain without intravenous contrast.  FINDINGS: There is mild-to-moderate diffuse atrophy. There is decreased attenuation of the periventricular white matter bilaterally consistent with small vessel white matter ischemic disease. There are 2 small areas of low-attenuation in the right superior parietal lobe measuring approximately 2.2 cm and 1.9 cm and these are consistent with areas of chronic infarct. These were also present on the 06/04/2020 study but appear slightly larger.  There is no evidence of acute mass effect, hemorrhage or midline shift.  There is mucosal thickening in both ethmoid sinuses.      1. Chronic ischemic changes as described. 2. No acute process identified.  Radiation dose reduction techniques were utilized, including automated exposure control and exposure modulation based on body size.       XR Chest 1 View    Result Date: 11/18/2021  XR CHEST 1 VW-  HISTORY: Female who is 87 years-old,  weakness  TECHNIQUE: Frontal views of the chest  COMPARISON: 6/29/2020  FINDINGS: The heart size is borderline. Pulmonary vasculature is unremarkable. Aorta is tortuous. No focal pulmonary consolidation, pleural effusion, or pneumothorax is seen, apices are slightly excluded from the images. S-shaped thoracolumbar scoliosis is noted. No acute osseous process.      No focal pulmonary consolidation. Borderline heart size. Tortuous aorta. Follow-up as clinically indicated.  This report was finalized on 11/18/2021 1:58 PM by Dr. Morro Baker M.D.        I ordered the above noted radiological studies. I reviewed the images and results. I agree with the radiologist interpretation.    PROCEDURES  Procedures    MEDICATIONS GIVEN IN ER  Medications   sodium chloride 0.9 % flush 10 mL (has no  administration in time range)       PROGRESS, DATA ANALYSIS, CONSULTS, AND MEDICAL DECISION MAKING  A complete history and physical exam have been performed.  All available laboratory and imaging results have been reviewed by myself prior to disposition.    MDM  After the initial H&P, I discussed pertinent information from history and physical exam with patient/family.  Discussed differential diagnosis.  Discussed plan for ED evaluation/work-up/treatment.  All questions answered.  Patient/family is agreeable with plan.  ED Course as of 11/18/21 1655   Thu Nov 18, 2021   1415 My differential diagnosis for syncope includes but is not limited to:  Vasovagal reflex - situational stimulus, micturition, defecation, cough, sneezing, swallowing, postprandial state, react sinus hypersensitivity  Vascular-prolonged recumbency, sudden postural change, prolonged standing, hypovolemia, vasodilator drugs, autonomic neuropathy, adrenal insufficiency, subclavian steal, pulmonary embolism  Cardiac -arrhythmia, heart block, myocardial infarction, aortic stenosis, cardiac myxoma, cardiac, LV Dysfunction, Aortic Dissection, Pulmonary Hypertension, Pulmonary Stenosis, Pacemaker Failure  CNS-seizure, hypoxia, hypoglycemia, TIA,(basal vertebral), hydrocephalus     [JG]   1420 EKG independently viewed and contemporaneously interpreted by ED physician. Time: 1414.  Rate 95.  Interpretation: Normal sinus rhythm, normal axis, left atrial enlargement, right bundle branch block, no ST elevation, T wave inversion in V1 with ST depression with T wave inversion in V3 [JG]   1420 When compared with previous EKG on 11/15/2019, right bundle branch block is unchanged but ST depression with T wave inversion in V3 is new when compared with prior. [JG]   1447 Troponin elevated 0.085.  EKGs does show slight ST depression with new T wave inversion but only in one lead, no contiguous ST depression.  Patient has no chest pain or anginal equivalents at  present.  Will obtain repeat troponin. [JG]   1454 Patient has leukocytosis with significant bandemia.  Ordering blood cultures with lactic acid. [JG]   1603 Repeat troponin is downtrending, reassuring. [JG]   1603 Patient is slightly hyperthyroid with repeat TSH decreased, free T4 slightly elevated. [JG]   1626 Patient has leukocytosis with bandemia but chest x-ray shows no sign of pneumonia, urinalysis negative for urinary tract infection. Patient has no abdominal pain. [JG]   1627 Patient reassessed, discussed ED work-up and results, discussed plan for admission for syncope of unclear etiology with abnormal EKG and elevated troponin. Patient has no questions or concerns at present. [JG]   1629 Covid test is negative, chest x-ray unremarkable, discontinuing isolation. [JG]   1649 Phone call with Dr. Minor, neurology.  Discussed the patient, relevant history, exam, diagnostics, ED findings/progress, and concerns. They agree to admit the patient to telemetry observation. Care assumed by the admitting physician at this time.     [JG]      ED Course User Index  [JG] Domingo Ross MD       AS OF 16:55 EST VITALS:    BP - 129/81  HR - 88  TEMP - 97.1 °F (36.2 °C)  O2 SATS - 96%    DIAGNOSIS  Final diagnoses:   Syncope, unspecified syncope type   Abnormal EKG   Elevated troponin   Bandemia   Cough   Hyperthyroidism         DISPOSITION  ADMISSION    Discussed treatment plan and reason for admission with pt/family and admitting physician.  Pt/family voiced understanding of the plan for admission for further testing/treatment as needed.          Domingo Ross MD  11/18/21 [id number]

## 2021-11-18 NOTE — ED TRIAGE NOTES
Patient states that sometime last night she had a fall and that she then stayed on the floor because she was too weak to get up,. Her son came to her house this morning patient reports generalized weakness and a cough. She also states she does not think she is hurt anywhere from the fall last night but is just sore all over. Patient uses walker to walk, her son brought her in.     PAtient has on mask, nurse has on mask also.

## 2021-11-18 NOTE — ED NOTES
Reviewed home meds w/ pt-pt does not have med list w/ her-states she takes one other medication at home (one day per week, three times per day), but unsure of name of med. Confirmed pharm name.     Altagracia Wesley RN  11/18/21 2981

## 2021-11-18 NOTE — ED NOTES
"Patient presents to ED from home, notes fall last night. Patient cannot remember what attributed to her fall, states \"I just know I landed on the ground and I had such a hard time getting myself up.\"  Patient notes nephew checked on her this am.  Patient complains of generalized body aches, notes more pain to lower back than normal.  Patient denies passing out with fall.  Patient able to answer all orientation questions. Sinus with bbb.  VSS.  ABC's intact.  Patient able to go from rollator walker to bed with hands on assistance.  Patient was incontinent at home of stool, hygiene and pericare performed here, assisted by ASHLI Frias, patient tolerated well. Breathing is even and unlabored.  Denies any shortness of breath, but complains of cough that is semi productive with yellowish secretions.  Denies any fever or chest pain.  NAD noted.  Patient wearing mask, nurse wearing mask, n95, protective eyewear, faceshield, gloves and gown for care and assessment. Hand hygiene performed prior to and post care.       Paul Garcia RN  11/18/21 4506    "

## 2021-11-18 NOTE — H&P
"History and physical    Primary care physician  Dr. Kelsey Prabhakar    Chief complaint  Fell at home    History of present illness  87-year-old white female with history of hypertension hypothyroidism depression osteoarthritis rheumatoid arthritis who lives by herself brought to the emergency room after she fell at home and apparently syncopal episode she does not recall what happened.  Patient unable to get up until the nephew checked on her and brought to the hospital.  Now patient is fully alert oriented does not recall how what happened and denies any chest pain shortness of breath abdominal pain nausea vomiting diarrhea.  Patient is very weak and family already work-up to take her to assisted living on Saturday.  Patient has elevated troponin with no chest pain.  Patient also denies any fever cough congestion night sweats weight gain but has been losing weight with no appetite.  Patient admitted for management and DNR per her wishes.    PAST MEDICAL HISTORY  • Rheumatoid arthritis     • Depression     • Hypothyroidism     • Hypertension        PAST SURGICAL HISTORY              Procedure Laterality Date   • THYROID SURGERY       • TONSILLECTOMY             FAMILY HISTORY  History reviewed. No pertinent family history.     SOCIAL HISTORY                Socioeconomic History   • Marital status:    Tobacco Use   • Smoking status: Former Smoker   • Smokeless tobacco: Never Used   Substance and Sexual Activity   • Alcohol use: No   • Drug use: No         ALLERGIES  Patient has no known allergies.  Home medications reviewed     REVIEW OF SYSTEMS  All systems reviewed and negative except for those discussed in HPI.      PHYSICAL EXAM  Blood pressure 129/81, pulse 88, temperature 97.1 °F (36.2 °C), resp. rate 16, height 154.9 cm (61\"), weight 48.3 kg (106 lb 8 oz), SpO2 96 %.    General: No acute distress.  HENT: NCAT, PERRL, Nares patent.  Eyes: no scleral icterus.  Neck: trachea midline, no ROM limitations.  CV: " regular rhythm, regular rate.  Respiratory: normal effort, CTAB.  Abdomen: soft, nondistended, nontender bowel sounds positive  : deferred.  Musculoskeletal: no deformity.  Neuro: alert, moves all extremities, follows commands.  Skin: warm, dry.     LAB RESULTS  Lab Results (last 24 hours)     Procedure Component Value Units Date/Time    COVID-19, MICHELLE IN-HOUSE CEPHEID/SAMUEL NP SWAB IN TRANSPORT MEDIA 8-12 HR TAT - Swab, Nasopharynx [[id number]]  (Normal) Collected: 11/18/21 1515    Specimen: Swab from Nasopharynx Updated: 11/18/21 1558     COVID19 Not Detected    Narrative:      Fact sheet for providers: https://www.fda.gov/media/340802/download    Fact sheet for patients: https://www.fda.gov/media/428587/download    Test performed by PCR.    Troponin [[id number]]  (Abnormal) Collected: 11/18/21 1515    Specimen: Blood Updated: 11/18/21 1558     Troponin T 0.068 ng/mL     Narrative:      Troponin T Reference Range:  <= 0.03 ng/mL-   Negative for AMI  >0.03 ng/mL-     Abnormal for myocardial necrosis.  Clinicians would have to utilize clinical acumen, EKG, Troponin and serial changes to determine if it is an Acute Myocardial Infarction or myocardial injury due to an underlying chronic condition.       Results may be falsely decreased if patient taking Biotin.      Blood Culture - Blood, Arm, Right [[id number]] Collected: 11/18/21 1538    Specimen: Blood from Arm, Right Updated: 11/18/21 1550    Lactic Acid, Plasma [[id number]]  (Normal) Collected: 11/18/21 1515    Specimen: Blood Updated: 11/18/21 1544     Lactate 1.6 mmol/L     Procalcitonin [[id number]]  (Normal) Collected: 11/18/21 1410    Specimen: Blood Updated: 11/18/21 1533     Procalcitonin 0.16 ng/mL     Narrative:      As a Marker for Sepsis (Non-Neonates):     1. <0.5 ng/mL represents a low risk of severe sepsis and/or septic shock.  2. >2 ng/mL represents a high risk of severe sepsis and/or septic shock.    As a Marker for Lower Respiratory Tract Infections  "that require antibiotic therapy:  PCT on Admission     Antibiotic Therapy             6-12 Hrs later  >0.5                          Strongly Recommended            >0.25 - <0.5             Recommended  0.1 - 0.25                  Discouraged                       Remeasure/reassess PCT  <0.1                         Strongly Discouraged         Remeasure/reassess PCT      As 28 day mortality risk marker: \"Change in Procalcitonin Result\" (>80% or <=80%) if Day 0 (or Day 1) and Day 4 values are available. Refer to http://www.[facility]-calculator.com/    Change in PCT <=80 %   A decrease of PCT levels below or equal to 80% defines a positive change in PCT test result representing a higher risk for 28-day all-cause mortality of patients diagnosed with severe sepsis or septic shock.    Change in PCT >80 %   A decrease of PCT levels of more than 80% defines a negative change in PCT result representing a lower risk for 28-day all-cause mortality of patients diagnosed with severe sepsis or septic shock.                Blood Culture - Blood, Arm, Right [[id number]] Collected: 11/18/21 1514    Specimen: Blood from Arm, Right Updated: 11/18/21 1521    [place] Draw [[id number]] Collected: 11/18/21 1410    Specimen: Blood Updated: 11/18/21 1515    Narrative:      The following orders were created for panel order [place] Draw.  Procedure                               Abnormality         Status                     ---------                               -----------         ------                     Green Top (Gel)[[id number]]                                  Final result               Lavender Top[[id number]]                                     Final result               Gold Top - SST[[id number]]                                   Final result               Light Blue Top[[id number]]                                   Final result                 Please view results for these tests on the individual orders.    Lavender Top [[id number]] " Collected: 11/18/21 1410    Specimen: Blood Updated: 11/18/21 1515     Extra Tube hold for add-on     Comment: Auto resulted       Gold Top - SST [[id number]] Collected: 11/18/21 1410    Specimen: Blood Updated: 11/18/21 1515     Extra Tube Hold for add-ons.     Comment: Auto resulted.       Green Top (Gel) [[id number]] Collected: 11/18/21 1410    Specimen: Blood Updated: 11/18/21 1515     Extra Tube Hold for add-ons.     Comment: Auto resulted.       Light Blue Top [[id number]] Collected: 11/18/21 1410    Specimen: Blood Updated: 11/18/21 1515     Extra Tube hold for add-on     Comment: Auto resulted       Urinalysis With Microscopic If Indicated (No Culture) - Urine, Catheter [[id number]]  (Abnormal) Collected: 11/18/21 1424    Specimen: Urine, Catheter Updated: 11/18/21 1513     Color, UA Yellow     Appearance, UA Clear     pH, UA 5.5     Specific Gravity, UA 1.014     Glucose, UA Negative     Ketones, UA Trace     Bilirubin, UA Negative     Blood, UA Negative     Protein, UA Trace     Leuk Esterase, UA Negative     Nitrite, UA Negative     Urobilinogen, UA 1.0 E.U./dL    Narrative:      Urine microscopic not indicated.    TSH [[id number]]  (Abnormal) Collected: 11/18/21 1410    Specimen: Blood Updated: 11/18/21 1513     TSH 0.141 uIU/mL     T4, Free [[id number]]  (Abnormal) Collected: 11/18/21 1410    Specimen: Blood Updated: 11/18/21 1513     Free T4 1.83 ng/dL     Narrative:      Results may be falsely increased if patient taking Biotin.      Comprehensive Metabolic Panel [[id number]]  (Abnormal) Collected: 11/18/21 1410    Specimen: Blood Updated: 11/18/21 1442     Glucose 132 mg/dL      BUN 14 mg/dL      Creatinine 1.00 mg/dL      Sodium 136 mmol/L      Potassium 3.5 mmol/L      Comment: Slight hemolysis detected by analyzer. Results may be affected.        Chloride 99 mmol/L      CO2 25.1 mmol/L      Calcium 9.5 mg/dL      Total Protein 7.7 g/dL      Albumin 3.60 g/dL      ALT (SGPT) 18 U/L      AST (SGOT) 46 U/L       Alkaline Phosphatase 113 U/L      Total Bilirubin 0.4 mg/dL      eGFR Non African Amer 52 mL/min/1.73      Globulin 4.1 gm/dL      A/G Ratio 0.9 g/dL      BUN/Creatinine Ratio 14.0     Anion Gap 11.9 mmol/L     Narrative:      GFR Normal >60  Chronic Kidney Disease <60  Kidney Failure <15      Magnesium [[id number]]  (Normal) Collected: 11/18/21 1410    Specimen: Blood Updated: 11/18/21 1442     Magnesium 1.8 mg/dL     Troponin [[id number]]  (Abnormal) Collected: 11/18/21 1410    Specimen: Blood Updated: 11/18/21 1441     Troponin T 0.085 ng/mL     Narrative:      Troponin T Reference Range:  <= 0.03 ng/mL-   Negative for AMI  >0.03 ng/mL-     Abnormal for myocardial necrosis.  Clinicians would have to utilize clinical acumen, EKG, Troponin and serial changes to determine if it is an Acute Myocardial Infarction or myocardial injury due to an underlying chronic condition.       Results may be falsely decreased if patient taking Biotin.      CBC & Differential [[id number]]  (Abnormal) Collected: 11/18/21 1410    Specimen: Blood Updated: 11/18/21 1419    Narrative:      The following orders were created for panel order CBC & Differential.  Procedure                               Abnormality         Status                     ---------                               -----------         ------                     CBC Auto Differential[[id number]]        Abnormal            Final result                 Please view results for these tests on the individual orders.    CBC Auto Differential [[id number]]  (Abnormal) Collected: 11/18/21 1410    Specimen: Blood Updated: 11/18/21 1419     WBC 15.52 10*3/mm3      RBC 4.05 10*6/mm3      Hemoglobin 12.2 g/dL      Hematocrit 36.5 %      MCV 90.1 fL      MCH 30.1 pg      MCHC 33.4 g/dL      RDW 13.8 %      RDW-SD 44.0 fl      MPV 10.2 fL      Platelets 477 10*3/mm3      Neutrophil % 79.7 %      Lymphocyte % 9.8 %      Monocyte % 8.2 %      Eosinophil % 0.4 %      Basophil % 0.3 %       Immature Grans % 1.6 %      Neutrophils, Absolute 12.36 10*3/mm3      Lymphocytes, Absolute 1.52 10*3/mm3      Monocytes, Absolute 1.28 10*3/mm3      Eosinophils, Absolute 0.06 10*3/mm3      Basophils, Absolute 0.05 10*3/mm3      Immature Grans, Absolute 0.25 10*3/mm3      nRBC 0.0 /100 WBC         Imaging Results (Last 24 Hours)     Procedure Component Value Units Date/Time    CT Head Without Contrast [[id number]] Collected: 11/18/21 1548     Updated: 11/18/21 1719    Narrative:      CT OF THE BRAIN WITHOUT CONTRAST 11/18/2021     HISTORY: Fell, headache, weakness.     TECHNIQUE: Axial images were obtained through the brain without  intravenous contrast.     FINDINGS: There is mild-to-moderate diffuse atrophy. There is decreased  attenuation of the periventricular white matter bilaterally consistent  with small vessel white matter ischemic disease. There are 2 small areas  of low-attenuation in the right superior parietal lobe measuring  approximately 2.2 cm and 1.9 cm and these are consistent with areas of  chronic infarct. These were also present on the 06/04/2020 study but  appear slightly larger.     There is no evidence of acute mass effect, hemorrhage or midline shift.     There is mucosal thickening in both ethmoid sinuses.       Impression:      1. Chronic ischemic changes as described.  2. No acute process identified.     Radiation dose reduction techniques were utilized, including automated  exposure control and exposure modulation based on body size.     This report was finalized on 11/18/2021 5:16 PM by Dr. Billy Rodriguez M.D.       XR Chest 1 View [[id number]] Collected: 11/18/21 1356     Updated: 11/18/21 1401    Narrative:      XR CHEST 1 VW-     HISTORY: Female who is 87 years-old,  weakness     TECHNIQUE: Frontal views of the chest     COMPARISON: 6/29/2020     FINDINGS: The heart size is borderline. Pulmonary vasculature is  unremarkable. Aorta is tortuous. No focal pulmonary  consolidation,  pleural effusion, or pneumothorax is seen, apices are slightly excluded  from the images. S-shaped thoracolumbar scoliosis is noted. No acute  osseous process.       Impression:      No focal pulmonary consolidation. Borderline heart size.  Tortuous aorta. Follow-up as clinically indicated.     This report was finalized on 11/18/2021 1:58 PM by Dr. Morro Baker M.D.                ECG 12 Lead  Component   Ref Range & Units 11/18/21 1414   QT Interval   ms 415              HEART RATE= 95  bpm  RR Interval= 648  ms  CA Interval= 126  ms  P Horizontal Axis= -8  deg  P Front Axis= 50  deg  QRSD Interval= 129  ms  QT Interval= 415  ms  QRS Axis= 9  deg  T Wave Axis= -8  deg  - ABNORMAL ECG -  Sinus rhythm  Atrial premature complex  Probable left atrial enlargement  Right bundle branch block  No change from prior tracing             Current Facility-Administered Medications:   •  aspirin chewable tablet 81 mg, 81 mg, Oral, Daily, Twin Minor MD  •  [START ON 11/19/2021] atorvastatin (LIPITOR) tablet 10 mg, 10 mg, Oral, Nightly, Twin Minor MD  •  HYDROcodone-acetaminophen (NORCO) 5-325 MG per tablet 1 tablet, 1 tablet, Oral, Q4H PRN, Twin Minor MD  •  hydroxychloroquine (PLAQUENIL) tablet 200 mg, 200 mg, Oral, Q24H, Twin Minor MD  •  levothyroxine (SYNTHROID, LEVOTHROID) tablet 25 mcg, 25 mcg, Oral, Daily, Twin Minor MD  •  losartan (COZAAR) tablet 100 mg, 100 mg, Oral, Q24H, Twin Minor MD  •  [START ON 11/19/2021] pantoprazole (PROTONIX) EC tablet 40 mg, 40 mg, Oral, Q AM, Twin Minor MD  •  PARoxetine (PAXIL) tablet 20 mg, 20 mg, Oral, Nightly, Twin Minor MD  •  sodium chloride 0.9 % flush 10 mL, 10 mL, Intravenous, PRN, Domingo Ross MD    Current Outpatient Medications:   •  atorvastatin (LIPITOR) 20 MG tablet, Take 20 mg by mouth daily., Disp: , Rfl:   •  hydroxychloroquine (PLAQUENIL) 200 MG tablet, Take  by mouth daily., Disp: , Rfl:   •  levothyroxine (SYNTHROID,  LEVOTHROID) 50 MCG tablet, Take 50 mcg by mouth daily., Disp: , Rfl:   •  olmesartan-hydrochlorothiazide (BENICAR HCT) 40-12.5 MG per tablet, Take 1 tablet by mouth daily., Disp: , Rfl:   •  PARoxetine (PAXIL) 20 MG tablet, Take 20 mg by mouth Every Night., Disp: , Rfl:   •  HYDROcodone-acetaminophen (NORCO) 5-325 MG per tablet, Take 1/2 to 1 tablet q6h as needed for pain., Disp: 12 tablet, Rfl: 0     ASSESSMENT  Syncopal episode  Elevated troponin  History of hypothyroidism with increased T4  Osteoarthritis  Rheumatoid arthritis  Hypertension  Hyperlipidemia  Gastroesophageal reflux disease    PLAN  Admit  Serial cardiac enzymes and EKG  Check 2D echo  Cardiology consult  Stop Synthroid supplement  Adjust home medications  Stress ulcer and DVT prophylaxis  Supportive care  PT OT  DNR  Discussed with family and nursing staff  Follow closely and further recommendation according to hospital course    JONNY BOOKER MD

## 2021-11-19 ENCOUNTER — APPOINTMENT (OUTPATIENT)
Dept: CARDIOLOGY | Facility: HOSPITAL | Age: 86
End: 2021-11-19

## 2021-11-19 ENCOUNTER — APPOINTMENT (OUTPATIENT)
Dept: MRI IMAGING | Facility: HOSPITAL | Age: 86
End: 2021-11-19

## 2021-11-19 LAB
ALBUMIN SERPL-MCNC: 3 G/DL (ref 3.5–5.2)
ALBUMIN/GLOB SERPL: 1 G/DL
ALP SERPL-CCNC: 82 U/L (ref 39–117)
ALT SERPL W P-5'-P-CCNC: 17 U/L (ref 1–33)
ANION GAP SERPL CALCULATED.3IONS-SCNC: 10.9 MMOL/L (ref 5–15)
AST SERPL-CCNC: 32 U/L (ref 1–32)
BASOPHILS # BLD AUTO: 0.05 10*3/MM3 (ref 0–0.2)
BASOPHILS NFR BLD AUTO: 0.4 % (ref 0–1.5)
BH CV ECHO MEAS - ACS: 1.4 CM
BH CV ECHO MEAS - AO MAX PG (FULL): 9.4 MMHG
BH CV ECHO MEAS - AO MAX PG: 13.6 MMHG
BH CV ECHO MEAS - AO MEAN PG (FULL): 6.8 MMHG
BH CV ECHO MEAS - AO MEAN PG: 8.9 MMHG
BH CV ECHO MEAS - AO ROOT AREA (BSA CORRECTED): 1.9
BH CV ECHO MEAS - AO ROOT AREA: 5.8 CM^2
BH CV ECHO MEAS - AO ROOT DIAM: 2.7 CM
BH CV ECHO MEAS - AO V2 MAX: 184.6 CM/SEC
BH CV ECHO MEAS - AO V2 MEAN: 144.1 CM/SEC
BH CV ECHO MEAS - AO V2 VTI: 33.9 CM
BH CV ECHO MEAS - AVA(I,A): 1.6 CM^2
BH CV ECHO MEAS - AVA(I,D): 1.6 CM^2
BH CV ECHO MEAS - AVA(V,A): 1.4 CM^2
BH CV ECHO MEAS - AVA(V,D): 1.4 CM^2
BH CV ECHO MEAS - BSA(HAYCOCK): 1.4 M^2
BH CV ECHO MEAS - BSA: 1.4 M^2
BH CV ECHO MEAS - BZI_BMI: 19.1 KILOGRAMS/M^2
BH CV ECHO MEAS - BZI_METRIC_HEIGHT: 154.9 CM
BH CV ECHO MEAS - BZI_METRIC_WEIGHT: 45.8 KG
BH CV ECHO MEAS - EDV(CUBED): 45.9 ML
BH CV ECHO MEAS - EDV(MOD-SP2): 32 ML
BH CV ECHO MEAS - EDV(MOD-SP4): 33 ML
BH CV ECHO MEAS - EDV(TEICH): 53.7 ML
BH CV ECHO MEAS - EF(CUBED): 52.8 %
BH CV ECHO MEAS - EF(MOD-BP): 53.8 %
BH CV ECHO MEAS - EF(MOD-SP2): 46.9 %
BH CV ECHO MEAS - EF(MOD-SP4): 60.6 %
BH CV ECHO MEAS - EF(TEICH): 45.6 %
BH CV ECHO MEAS - ESV(CUBED): 21.7 ML
BH CV ECHO MEAS - ESV(MOD-SP2): 17 ML
BH CV ECHO MEAS - ESV(MOD-SP4): 13 ML
BH CV ECHO MEAS - ESV(TEICH): 29.2 ML
BH CV ECHO MEAS - FS: 22.1 %
BH CV ECHO MEAS - IVS/LVPW: 1.3
BH CV ECHO MEAS - IVSD: 1.1 CM
BH CV ECHO MEAS - LA DIMENSION: 3.3 CM
BH CV ECHO MEAS - LA/AO: 1.2
BH CV ECHO MEAS - LAT PEAK E' VEL: 5.5 CM/SEC
BH CV ECHO MEAS - LV DIASTOLIC VOL/BSA (35-75): 23.4 ML/M^2
BH CV ECHO MEAS - LV IVRT: 0.1 SEC
BH CV ECHO MEAS - LV MASS(C)D: 102.8 GRAMS
BH CV ECHO MEAS - LV MASS(C)DI: 72.7 GRAMS/M^2
BH CV ECHO MEAS - LV MAX PG: 4.2 MMHG
BH CV ECHO MEAS - LV MEAN PG: 2.1 MMHG
BH CV ECHO MEAS - LV SYSTOLIC VOL/BSA (12-30): 9.2 ML/M^2
BH CV ECHO MEAS - LV V1 MAX: 102.8 CM/SEC
BH CV ECHO MEAS - LV V1 MEAN: 66.9 CM/SEC
BH CV ECHO MEAS - LV V1 VTI: 20.5 CM
BH CV ECHO MEAS - LVIDD: 3.6 CM
BH CV ECHO MEAS - LVIDS: 2.8 CM
BH CV ECHO MEAS - LVLD AP2: 6 CM
BH CV ECHO MEAS - LVLD AP4: 6.4 CM
BH CV ECHO MEAS - LVLS AP2: 5.3 CM
BH CV ECHO MEAS - LVLS AP4: 4.9 CM
BH CV ECHO MEAS - LVOT AREA (M): 2.5 CM^2
BH CV ECHO MEAS - LVOT AREA: 2.6 CM^2
BH CV ECHO MEAS - LVOT DIAM: 1.8 CM
BH CV ECHO MEAS - LVPWD: 0.84 CM
BH CV ECHO MEAS - MED PEAK E' VEL: 9.4 CM/SEC
BH CV ECHO MEAS - MV A DUR: 0.12 SEC
BH CV ECHO MEAS - MV A MAX VEL: 150.2 CM/SEC
BH CV ECHO MEAS - MV DEC SLOPE: 494.2 CM/SEC^2
BH CV ECHO MEAS - MV DEC TIME: 266 SEC
BH CV ECHO MEAS - MV E MAX VEL: 106 CM/SEC
BH CV ECHO MEAS - MV E/A: 0.71
BH CV ECHO MEAS - MV MAX PG: 16.3 MMHG
BH CV ECHO MEAS - MV MEAN PG: 5.2 MMHG
BH CV ECHO MEAS - MV P1/2T MAX VEL: 132.5 CM/SEC
BH CV ECHO MEAS - MV P1/2T: 78.5 MSEC
BH CV ECHO MEAS - MV V2 MAX: 202 CM/SEC
BH CV ECHO MEAS - MV V2 MEAN: 105.7 CM/SEC
BH CV ECHO MEAS - MV V2 VTI: 40.8 CM
BH CV ECHO MEAS - MVA P1/2T LCG: 1.7 CM^2
BH CV ECHO MEAS - MVA(P1/2T): 2.8 CM^2
BH CV ECHO MEAS - MVA(VTI): 1.3 CM^2
BH CV ECHO MEAS - PA ACC TIME: 0.14 SEC
BH CV ECHO MEAS - PA MAX PG (FULL): 0.53 MMHG
BH CV ECHO MEAS - PA MAX PG: 4 MMHG
BH CV ECHO MEAS - PA PR(ACCEL): 17.2 MMHG
BH CV ECHO MEAS - PA V2 MAX: 100.4 CM/SEC
BH CV ECHO MEAS - PVA(V,A): 3.2 CM^2
BH CV ECHO MEAS - PVA(V,D): 3.2 CM^2
BH CV ECHO MEAS - QP/QS: 1.4
BH CV ECHO MEAS - RAP SYSTOLE: 3 MMHG
BH CV ECHO MEAS - RV MAX PG: 3.5 MMHG
BH CV ECHO MEAS - RV MEAN PG: 2.1 MMHG
BH CV ECHO MEAS - RV V1 MAX: 93.6 CM/SEC
BH CV ECHO MEAS - RV V1 MEAN: 68 CM/SEC
BH CV ECHO MEAS - RV V1 VTI: 20.8 CM
BH CV ECHO MEAS - RVOT AREA: 3.4 CM^2
BH CV ECHO MEAS - RVOT DIAM: 2.1 CM
BH CV ECHO MEAS - RVSP: 38.9 MMHG
BH CV ECHO MEAS - SI(AO): 139.9 ML/M^2
BH CV ECHO MEAS - SI(CUBED): 17.1 ML/M^2
BH CV ECHO MEAS - SI(LVOT): 37.4 ML/M^2
BH CV ECHO MEAS - SI(MOD-SP2): 10.6 ML/M^2
BH CV ECHO MEAS - SI(MOD-SP4): 14.2 ML/M^2
BH CV ECHO MEAS - SI(TEICH): 17.3 ML/M^2
BH CV ECHO MEAS - SV(AO): 197.7 ML
BH CV ECHO MEAS - SV(CUBED): 24.2 ML
BH CV ECHO MEAS - SV(LVOT): 52.8 ML
BH CV ECHO MEAS - SV(MOD-SP2): 15 ML
BH CV ECHO MEAS - SV(MOD-SP4): 20 ML
BH CV ECHO MEAS - SV(RVOT): 71.3 ML
BH CV ECHO MEAS - SV(TEICH): 24.5 ML
BH CV ECHO MEAS - TAPSE (>1.6): 1.9 CM
BH CV ECHO MEAS - TR MAX VEL: 299.7 CM/SEC
BH CV ECHO MEASUREMENTS AVERAGE E/E' RATIO: 14.23
BH CV XLRA - RV BASE: 2.2 CM
BH CV XLRA - RV LENGTH: 5.4 CM
BH CV XLRA - RV MID: 1.7 CM
BH CV XLRA - TDI S': 13.4 CM/SEC
BILIRUB SERPL-MCNC: 0.2 MG/DL (ref 0–1.2)
BUN SERPL-MCNC: 19 MG/DL (ref 8–23)
BUN/CREAT SERPL: 17.8 (ref 7–25)
CALCIUM SPEC-SCNC: 8.4 MG/DL (ref 8.6–10.5)
CHLORIDE SERPL-SCNC: 104 MMOL/L (ref 98–107)
CHOLEST SERPL-MCNC: 131 MG/DL (ref 0–200)
CK SERPL-CCNC: 360 U/L (ref 20–180)
CO2 SERPL-SCNC: 24.1 MMOL/L (ref 22–29)
CREAT SERPL-MCNC: 1.07 MG/DL (ref 0.57–1)
DEPRECATED RDW RBC AUTO: 45.7 FL (ref 37–54)
EOSINOPHIL # BLD AUTO: 0.4 10*3/MM3 (ref 0–0.4)
EOSINOPHIL NFR BLD AUTO: 3.5 % (ref 0.3–6.2)
ERYTHROCYTE [DISTWIDTH] IN BLOOD BY AUTOMATED COUNT: 13.9 % (ref 12.3–15.4)
GFR SERPL CREATININE-BSD FRML MDRD: 49 ML/MIN/1.73
GLOBULIN UR ELPH-MCNC: 3 GM/DL
GLUCOSE BLDC GLUCOMTR-MCNC: 100 MG/DL (ref 70–130)
GLUCOSE BLDC GLUCOMTR-MCNC: 119 MG/DL (ref 70–130)
GLUCOSE BLDC GLUCOMTR-MCNC: 89 MG/DL (ref 70–130)
GLUCOSE SERPL-MCNC: 102 MG/DL (ref 65–99)
HBA1C MFR BLD: 5.2 % (ref 4.8–5.6)
HCT VFR BLD AUTO: 30 % (ref 34–46.6)
HDLC SERPL-MCNC: 30 MG/DL (ref 40–60)
HGB BLD-MCNC: 9.8 G/DL (ref 12–15.9)
IMM GRANULOCYTES # BLD AUTO: 0.08 10*3/MM3 (ref 0–0.05)
IMM GRANULOCYTES NFR BLD AUTO: 0.7 % (ref 0–0.5)
IVRT: 103 MSEC
LDLC SERPL CALC-MCNC: 82 MG/DL (ref 0–100)
LDLC/HDLC SERPL: 2.69 {RATIO}
LYMPHOCYTES # BLD AUTO: 1.7 10*3/MM3 (ref 0.7–3.1)
LYMPHOCYTES NFR BLD AUTO: 14.7 % (ref 19.6–45.3)
MCH RBC QN AUTO: 29.7 PG (ref 26.6–33)
MCHC RBC AUTO-ENTMCNC: 32.7 G/DL (ref 31.5–35.7)
MCV RBC AUTO: 90.9 FL (ref 79–97)
MONOCYTES # BLD AUTO: 1.27 10*3/MM3 (ref 0.1–0.9)
MONOCYTES NFR BLD AUTO: 11 % (ref 5–12)
NEUTROPHILS NFR BLD AUTO: 69.7 % (ref 42.7–76)
NEUTROPHILS NFR BLD AUTO: 8.07 10*3/MM3 (ref 1.7–7)
NRBC BLD AUTO-RTO: 0 /100 WBC (ref 0–0.2)
NT-PROBNP SERPL-MCNC: 4967 PG/ML (ref 0–1800)
PLATELET # BLD AUTO: 408 10*3/MM3 (ref 140–450)
PMV BLD AUTO: 10.3 FL (ref 6–12)
POTASSIUM SERPL-SCNC: 3.2 MMOL/L (ref 3.5–5.2)
PROT SERPL-MCNC: 6 G/DL (ref 6–8.5)
RBC # BLD AUTO: 3.3 10*6/MM3 (ref 3.77–5.28)
SINUS: 3.2 CM
SODIUM SERPL-SCNC: 139 MMOL/L (ref 136–145)
STJ: 3.3 CM
TRIGL SERPL-MCNC: 101 MG/DL (ref 0–150)
TROPONIN T SERPL-MCNC: 0.07 NG/ML (ref 0–0.03)
TSH SERPL DL<=0.05 MIU/L-ACNC: 0.12 UIU/ML (ref 0.27–4.2)
VLDLC SERPL-MCNC: 19 MG/DL (ref 5–40)
WBC NRBC COR # BLD: 11.57 10*3/MM3 (ref 3.4–10.8)

## 2021-11-19 PROCEDURE — 83036 HEMOGLOBIN GLYCOSYLATED A1C: CPT | Performed by: HOSPITALIST

## 2021-11-19 PROCEDURE — 80061 LIPID PANEL: CPT | Performed by: HOSPITALIST

## 2021-11-19 PROCEDURE — 82550 ASSAY OF CK (CPK): CPT | Performed by: HOSPITALIST

## 2021-11-19 PROCEDURE — 84443 ASSAY THYROID STIM HORMONE: CPT | Performed by: HOSPITALIST

## 2021-11-19 PROCEDURE — 70551 MRI BRAIN STEM W/O DYE: CPT

## 2021-11-19 PROCEDURE — 97166 OT EVAL MOD COMPLEX 45 MIN: CPT

## 2021-11-19 PROCEDURE — 84484 ASSAY OF TROPONIN QUANT: CPT | Performed by: HOSPITALIST

## 2021-11-19 PROCEDURE — 80053 COMPREHEN METABOLIC PANEL: CPT | Performed by: HOSPITALIST

## 2021-11-19 PROCEDURE — 93306 TTE W/DOPPLER COMPLETE: CPT

## 2021-11-19 PROCEDURE — 97162 PT EVAL MOD COMPLEX 30 MIN: CPT

## 2021-11-19 PROCEDURE — 0 IOPAMIDOL PER 1 ML: Performed by: HOSPITALIST

## 2021-11-19 PROCEDURE — 93306 TTE W/DOPPLER COMPLETE: CPT | Performed by: INTERNAL MEDICINE

## 2021-11-19 PROCEDURE — 36415 COLL VENOUS BLD VENIPUNCTURE: CPT | Performed by: HOSPITALIST

## 2021-11-19 PROCEDURE — 97535 SELF CARE MNGMENT TRAINING: CPT

## 2021-11-19 PROCEDURE — 97530 THERAPEUTIC ACTIVITIES: CPT

## 2021-11-19 PROCEDURE — 83880 ASSAY OF NATRIURETIC PEPTIDE: CPT | Performed by: HOSPITALIST

## 2021-11-19 PROCEDURE — 99221 1ST HOSP IP/OBS SF/LOW 40: CPT | Performed by: INTERNAL MEDICINE

## 2021-11-19 PROCEDURE — 25010000002 FUROSEMIDE PER 20 MG: Performed by: NURSE PRACTITIONER

## 2021-11-19 PROCEDURE — 82962 GLUCOSE BLOOD TEST: CPT

## 2021-11-19 PROCEDURE — 99222 1ST HOSP IP/OBS MODERATE 55: CPT | Performed by: PSYCHIATRY & NEUROLOGY

## 2021-11-19 PROCEDURE — 85025 COMPLETE CBC W/AUTO DIFF WBC: CPT | Performed by: HOSPITALIST

## 2021-11-19 RX ORDER — CARVEDILOL 3.12 MG/1
3.12 TABLET ORAL 2 TIMES DAILY WITH MEALS
Status: DISCONTINUED | OUTPATIENT
Start: 2021-11-20 | End: 2021-11-20 | Stop reason: HOSPADM

## 2021-11-19 RX ORDER — CLOPIDOGREL BISULFATE 75 MG/1
75 TABLET ORAL DAILY
Status: DISCONTINUED | OUTPATIENT
Start: 2021-11-19 | End: 2021-11-20 | Stop reason: HOSPADM

## 2021-11-19 RX ORDER — FUROSEMIDE 10 MG/ML
10 INJECTION INTRAMUSCULAR; INTRAVENOUS DAILY
Status: DISCONTINUED | OUTPATIENT
Start: 2021-11-19 | End: 2021-11-20

## 2021-11-19 RX ORDER — LOSARTAN POTASSIUM 25 MG/1
25 TABLET ORAL
Status: DISCONTINUED | OUTPATIENT
Start: 2021-11-20 | End: 2021-11-20 | Stop reason: HOSPADM

## 2021-11-19 RX ORDER — CARVEDILOL 3.12 MG/1
3.12 TABLET ORAL 2 TIMES DAILY WITH MEALS
Status: DISCONTINUED | OUTPATIENT
Start: 2021-11-19 | End: 2021-11-19

## 2021-11-19 RX ORDER — POTASSIUM CHLORIDE 750 MG/1
40 TABLET, FILM COATED, EXTENDED RELEASE ORAL AS NEEDED
Status: DISCONTINUED | OUTPATIENT
Start: 2021-11-19 | End: 2021-11-20 | Stop reason: HOSPADM

## 2021-11-19 RX ORDER — POTASSIUM CHLORIDE 1.5 G/1.77G
40 POWDER, FOR SOLUTION ORAL AS NEEDED
Status: DISCONTINUED | OUTPATIENT
Start: 2021-11-19 | End: 2021-11-20 | Stop reason: HOSPADM

## 2021-11-19 RX ORDER — ATORVASTATIN CALCIUM 20 MG/1
10 TABLET, FILM COATED ORAL NIGHTLY
Status: DISCONTINUED | OUTPATIENT
Start: 2021-11-19 | End: 2021-11-20 | Stop reason: HOSPADM

## 2021-11-19 RX ORDER — POTASSIUM CHLORIDE 7.45 MG/ML
10 INJECTION INTRAVENOUS
Status: DISCONTINUED | OUTPATIENT
Start: 2021-11-19 | End: 2021-11-20 | Stop reason: HOSPADM

## 2021-11-19 RX ADMIN — SODIUM CHLORIDE 75 ML/HR: 9 INJECTION, SOLUTION INTRAVENOUS at 00:19

## 2021-11-19 RX ADMIN — LOSARTAN POTASSIUM 100 MG: 100 TABLET, FILM COATED ORAL at 12:09

## 2021-11-19 RX ADMIN — NITROGLYCERIN 1 INCH: 20 OINTMENT TOPICAL at 17:35

## 2021-11-19 RX ADMIN — SODIUM CHLORIDE, PRESERVATIVE FREE 10 ML: 5 INJECTION INTRAVENOUS at 12:10

## 2021-11-19 RX ADMIN — ATORVASTATIN CALCIUM 10 MG: 20 TABLET, FILM COATED ORAL at 20:53

## 2021-11-19 RX ADMIN — HYDROXYCHLOROQUINE SULFATE 200 MG: 200 TABLET ORAL at 12:09

## 2021-11-19 RX ADMIN — ASPIRIN 81 MG: 81 TABLET, CHEWABLE ORAL at 12:09

## 2021-11-19 RX ADMIN — POTASSIUM CHLORIDE 40 MEQ: 750 TABLET, EXTENDED RELEASE ORAL at 17:35

## 2021-11-19 RX ADMIN — PAROXETINE HYDROCHLORIDE HEMIHYDRATE 20 MG: 20 TABLET, FILM COATED ORAL at 20:54

## 2021-11-19 RX ADMIN — SODIUM CHLORIDE, PRESERVATIVE FREE 10 ML: 5 INJECTION INTRAVENOUS at 23:15

## 2021-11-19 RX ADMIN — SODIUM CHLORIDE, PRESERVATIVE FREE 10 ML: 5 INJECTION INTRAVENOUS at 00:20

## 2021-11-19 RX ADMIN — FUROSEMIDE 10 MG: 10 INJECTION, SOLUTION INTRAMUSCULAR; INTRAVENOUS at 17:35

## 2021-11-19 RX ADMIN — CLOPIDOGREL 75 MG: 75 TABLET, FILM COATED ORAL at 17:35

## 2021-11-19 RX ADMIN — POTASSIUM CHLORIDE 40 MEQ: 750 TABLET, EXTENDED RELEASE ORAL at 22:57

## 2021-11-19 NOTE — CONSULTS
Stroke Consult Note    Patient Name: Verona Avila   MRN: [id number]  Age: 87 y.o.  Sex: female  : 1934    Primary Care Physician: Kelsey Prabhakar MD  Referring Physician:  Twin Minor MD        Handedness: Right  Race: White    Chief Complaint/Reason for Consultation: Unresponsiveness/syncope    Subjective .  HPI: 87-year-old right-handed white female with known diagnosis of hypertension, hypothyroidism, rheumatoid arthritis, who has been admitted with an episode of syncope and found to have elevated troponins.  Patient does not recollect any event, and when her nephew found her, she was in bathroom, and had bowel incontinence, and believes that she was on the ground for a longer period of time.  Patient denies having any tongue bite, no seizure activity.  Denies any pain.  Patient is completely back to herself.  Apparently patient has been living by herself, and is not eating well.  Patient was in the process of moving to an assisted facility, and was supposed to move there tomorrow.  Patient denies any weakness/numbness/facial droop/slurred speech.    Last Known Normal Date/Time: Unknown     Review of Systems   Constitutional: Negative.    HENT: Negative.    Eyes: Negative.    Respiratory: Negative.    Cardiovascular: Negative.    Gastrointestinal: Negative.    Endocrine: Negative.    Genitourinary: Negative.    Musculoskeletal: Negative.    Skin: Negative.    Allergic/Immunologic: Negative.    Psychiatric/Behavioral: Negative.       Past Medical History:   Diagnosis Date   • Arthritis    • Depression    • Disease of thyroid gland    • Hypertension      Past Surgical History:   Procedure Laterality Date   • THYROID SURGERY     • TONSILLECTOMY       History reviewed. No pertinent family history.  Social History     Socioeconomic History   • Marital status:    Tobacco Use   • Smoking status: Former Smoker   • Smokeless tobacco: Never Used   Substance and Sexual Activity   • Alcohol use: No   • Drug  use: No     No Known Allergies  Prior to Admission medications    Medication Sig Start Date End Date Taking? Authorizing Provider   atorvastatin (LIPITOR) 20 MG tablet Take 20 mg by mouth daily.   Yes Isaiah Steele MD   hydroxychloroquine (PLAQUENIL) 200 MG tablet Take  by mouth daily.   Yes Isaiah Steele MD   levothyroxine (SYNTHROID, LEVOTHROID) 50 MCG tablet Take 50 mcg by mouth daily.   Yes Isaiah Steele MD   olmesartan-hydrochlorothiazide (BENICAR HCT) 40-12.5 MG per tablet Take 1 tablet by mouth daily.   Yes Isaiah Steele MD   PARoxetine (PAXIL) 20 MG tablet Take 20 mg by mouth Every Night.   Yes Isaiah Steele MD   HYDROcodone-acetaminophen (NORCO) 5-325 MG per tablet Take 1/2 to 1 tablet q6h as needed for pain. 5/24/16   Matthew Yuen MD             Objective     Temp:  [97.4 °F (36.3 °C)] 97.4 °F (36.3 °C)  Heart Rate:  [86-93] 88  Resp:  [18-20] 20  BP: (119-162)/() 128/55  Neurological Exam  Mental Status  Awake, alert and oriented to person, place and time.Alert. Speech is normal. Language is fluent with no aphasia. Attention and concentration are normal. Fund of knowledge is appropriate for level of education.    Cranial Nerves  CN II: Visual fields full to confrontation.  CN III, IV, VI: Extraocular movements intact bilaterally. Pupils equal round and reactive to light bilaterally.  CN V: Facial sensation is normal.  CN VII: Full and symmetric facial movement.  CN VIII: Equal hearing bilaterally.  CN IX, X: Palate elevates symmetrically  CN XI: Shoulder shrug strength is normal.  CN XII: Tongue midline without atrophy or fasciculations.    Motor  Normal muscle bulk throughout. No fasciculations present. Normal muscle tone. Strength is 5/5 throughout all four extremities.    Sensory  Intact to light touch bilaterally.    Reflexes  Normal reflexes, downgoing plantars.    Coordination  No dysmetria.    Gait  Not assessed.      Physical Exam  Vitals and  nursing note reviewed.   Constitutional:       Appearance: Normal appearance.   HENT:      Head: Normocephalic and atraumatic.      Mouth/Throat:      Mouth: Mucous membranes are moist.      Pharynx: Oropharynx is clear.   Eyes:      Extraocular Movements: Extraocular movements intact.      Conjunctiva/sclera: Conjunctivae normal.      Pupils: Pupils are equal, round, and reactive to light.   Cardiovascular:      Rate and Rhythm: Normal rate and regular rhythm.   Pulmonary:      Effort: Pulmonary effort is normal. No respiratory distress.   Musculoskeletal:      Cervical back: Normal range of motion and neck supple.      Right lower leg: Edema present.      Left lower leg: Edema present.   Skin:     Coloration: Skin is not pale.      Findings: Bruising present. No erythema or rash.   Neurological:      Mental Status: She is alert.      Deep Tendon Reflexes: Strength normal.   Psychiatric:         Mood and Affect: Mood normal.         Speech: Speech normal.         Behavior: Behavior normal.         Acute Stroke Data    IV Thrombolytic (TPA/Tenecteplase) Inclusion / Exclusion Criteria    Time: 13:43 EST  Person Administering Scale: Timmy Carcamo MD    Inclusion Criteria  [x]   18 years of age or greater   []   Onset of symptoms < 4.5 hours before beginning treatment (stroke onset = time patient was last seen well or without symptoms).   []   Diagnosis of acute ischemic stroke causing measurable disabling deficit (Complete Hemianopia, Any Aphasia, Visual or Sensory Extinction, Any weakness limiting sustained effort against gravity)   []   Any remaining deficit considered potentially disabling in view of patient and practitioner   Exclusion criteria (Do not proceed with Alteplase if any are checked under exclusion criteria)  []   Onset unknown or GREATER than 4.5 hours   []   ICH on CT/MRI   []   CT demonstrates hypodensity representing acute or subacute infarct   []   Significant head trauma or prior stroke in the  previous 3 months   []   Symptoms suggestive of subarachnoid hemorrhage   []   History of un-ruptured intracranial aneurysm GREATER than 10 mm   []   Recent intracranial or intraspinal surgery within the last 3 months   []   Arterial puncture at a non-compressible site in the previous 7 days   []   Active internal bleeding   []   Acute bleeding tendency   []   Platelet count LESS than 100,000 for known hematological diseases such as leukemia, thrombocytopenia or chronic cirrhosis   []   Current use of anticoagulant with INR GREATER than 1.7 or PT GREATER than 15 seconds, aPTT GREATER than 40 seconds   []   Heparin received within 48 hours, resulting in abnormally elevated aPTT GREATER than upper limit of normal   []   Current use of direct thrombin inhibitors or direct factor Xa inhibitors in the past 48 hours   []   Elevated blood pressure refractory to treatment (systolic GREATER than 185 mm/Hg or diastolic  GREATER than 110 mm/Hg   []   Suspected infective endocarditis and aortic arch dissection   []   Current use of therapeutic treatment dose of low-molecular-weight heparin (LMWH) within the previous 24 hours   []   Structural GI malignancy or bleed   Relative exclusion for all patients  []   Only minor non-disabling symptoms   []   Pregnancy   []   Seizure at onset with postictal residual neurological impairments   []   Major surgery or previous trauma within past 14 days   []   History of previous spontaneous ICH, intracranial neoplasm, or AV malformation   []   Postpartum (within previous 14 days)   []   Recent GI or urinary tract hemorrhage (within previous 21 days)   []   Recent acute MI (within previous 3 months)   []   History of un-ruptured intracranial aneurysm LESS than 10 mm   []   History of ruptured intracranial aneurysm   []   Blood glucose LESS than 50 mg/dL (2.7 mmol/L)   []   Dural puncture within the last 7 days   []   Known GREATER than 10 cerebral microbleeds   Additional exclusions for  patients with symptoms onset between 3 and 4.5 hours.  []   Age > 80.   []   On any anticoagulants regardless of INR  >>> Warfarin (Coumadin), Heparin, Enoxaparin (Lovenox), fondaparinux (Arixtra), bivalirudin (Angiomax), Argatroban, dabigatran (Pradaxa), rivaroxaban (Xarelto), or apixaban (Eliquis)   []   Severe stroke (NIHSS > 25).   []   History of BOTH diabetes and previous ischemic stroke.   []   The risks and benefits have been discussed with the patient or family related to the administration of IV Alteplase for stroke symptoms.   []   I have discussed and reviewed the patient's case and imaging with the attending prior to IV Thrombolytic (TPA/Tenecteplase).    Time Thrombolytic administered       Hospital Meds:  Scheduled- aspirin, 81 mg, Oral, Daily  atorvastatin, 10 mg, Oral, Nightly  hydroxychloroquine, 200 mg, Oral, Q24H  losartan, 100 mg, Oral, Q24H  pantoprazole, 40 mg, Oral, Q AM  PARoxetine, 20 mg, Oral, Nightly  sodium chloride, 10 mL, Intravenous, Q12H      Infusions- sodium chloride, 50 mL/hr, Last Rate: 50 mL/hr (21 1210)       PRNs- HYDROcodone-acetaminophen  •  sodium chloride  •  sodium chloride    Functional Status Prior to Current Stroke/[place] Score: 3 (uses a walker)    NIH Stroke Scale  Time: 13:43 EST  Person Administering Scale: Timmy Carcamo MD    1a  Level of consciousness: 0=alert; keenly responsive   1b. LOC questions:  0=Performs both tasks correctly   1c. LOC commands: 0=Performs both tasks correctly   2.  Best Gaze: 0=normal   3.  Visual: 0=No visual loss   4. Facial Palsy: 0=Normal symmetric movement   5a.  Motor left arm: 0=No drift, limb holds 90 (or 45) degrees for full 10 seconds   5b.  Motor right arm: 0=No drift, limb holds 90 (or 45) degrees for full 10 seconds   6a. motor left le=No drift, limb holds 90 (or 45) degrees for full 10 seconds   6b  Motor right le=No drift, limb holds 90 (or 45) degrees for full 10 seconds   7. Limb Ataxia: 0=Absent   8.   Sensory: 0=Normal; no sensory loss   9. Best Language:  0=No aphasia, normal   10. Dysarthria: 0=Normal   11. 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  Her labs were reviewed, including troponins which were slightly elevated at 0.068.  CK levels were 360.  Her LDL is 82, total cholesterol 131 and triglyceride of 101.  A1c 5.2.  Other labs were relatively benign.              Assessment/Plan:      1. Syncope.  Less likely to be a seizure.  No further neuro work-up at this point.  Patient is noted to have extensive atherosclerotic disease, for which I would recommend her to be on aspirin 81 mg and Plavix 75 mg daily, along with Lipitor 20 mg daily (her home dose).  Cardiology team is involved, who plans to do stress test on her tomorrow.  Okay to perform stress test as per the cardiology team.  2. Cerebral atherosclerosis.  Patient noted to have extensive atherosclerotic disease.  Recommend to be on dual antiplatelets and statins, unless contraindicated.  3. Essential hypertension.  Encouraged her to check her blood pressure on a daily basis.  Normal blood pressure goals.  4. Poor appetite.  Encouraged patient to eat small meals, but more frequently.  Patient will be moved to assisted facility, where hopefully she will have increased appetite.  5. PT/OT can work with her.  6. Healthy heart diet.    Case was discussed with patient, her nephew, nursing and will talk to the hospitalist team.  No further neuro work-up  at this time.  We will sign off for now, please call us with questions.  Thank you for the consult.          Timmy Carcamo MD  November 19, 2021  13:43 EST

## 2021-11-19 NOTE — THERAPY EVALUATION
Patient Name: Verona Avila  : 1934    MRN: [id number]                              Today's Date: 2021       Admit Date: 2021    Visit Dx:     ICD-10-CM ICD-9-CM   1. Syncope, unspecified syncope type  R55 780.2   2. Abnormal EKG  R94.31 794.31   3. Elevated troponin  R77.8 790.6   4. Bandemia  D72.825 288.66   5. Cough  R05.9 786.2   6. Hyperthyroidism  E05.90 242.90     Patient Active Problem List   Diagnosis   • Syncope     Past Medical History:   Diagnosis Date   • Arthritis    • Depression    • Disease of thyroid gland    • Hypertension      Past Surgical History:   Procedure Laterality Date   • THYROID SURGERY     • TONSILLECTOMY        General Information     Row Name 21 [facility]          OT Time and Intention    Document Type evaluation  -     Mode of Treatment co-treatment; physical therapy; occupational therapy  -     Row Name 21 [facility]          General Information    Patient Profile Reviewed yes  -SM     Prior Level of Function independent:; ADL's; all household mobility  pt uses rollator  -     Existing Precautions/Restrictions fall  -     Row Name 21 140          Living Environment    Lives With alone  -     Row Name 21 140          Cognition    Orientation Status (Cognition) oriented x 4  -     Row Name 21 140          Safety Issues, Functional Mobility    Impairments Affecting Function (Mobility) endurance/activity tolerance; strength  -           User Key  (r) = Recorded By, (t) = Taken By, (c) = Cosigned By    Initials Name Provider Type     Orquidea Ramirez OT Occupational Therapist                 Mobility/ADL's     Row Name 21 140          Bed Mobility    Supine-Sit [place] (Bed Mobility) minimum assist (75% patient effort)  -     Assistive Device (Bed Mobility) head of bed elevated  -     Row Name 21 140          Transfers    Transfers toilet transfer  -     Sit-Stand [place] (Transfers) verbal cues;  contact guard  -     [place] Level (Toilet Transfer) minimum assist (75% patient effort); verbal cues  from lower commode  -     Assistive Device (Toilet Transfer) walker, 4-wheeled  -[facility] Name 11/19/21 1406          Sit-Stand Transfer    Assistive Device (Sit-Stand Transfers) walker, 4-wheeled  -SM     Row Name 11/19/21 1406          Functional Mobility    Functional Mobility- Ind. Level contact guard assist  -     Functional Mobility- Device rollator  -     Functional Mobility- Comment to/from bathroom.  -     Row Name 11/19/21 1406          Activities of Daily Living    BADL Assessment/Intervention lower body dressing; grooming; toileting  -[facility] Name 11/19/21 1406          Lower Body Dressing Assessment/Training    [place] Level (Lower Body Dressing) lower body dressing skills; don; socks; moderate assist (50% patient effort)  brief  -     Position (Lower Body Dressing) supported sitting  -SM     Row Name 11/19/21 1406          Grooming Assessment/Training    [place] Level (Grooming) grooming skills; wash face, hands; contact guard assist  -     Position (Grooming) sink side; supported standing  -SM     Row Name 11/19/21 1406          Toileting Assessment/Training    [place] Level (Toileting) toileting skills; contact guard assist  -           User Key  (r) = Recorded By, (t) = Taken By, (c) = Cosigned By    Initials Name Provider Type    Orquidea Silva OT Occupational Therapist               Obj/Interventions     Row Name 11/19/21 1408          Sensory Assessment (Somatosensory)    Sensory Assessment (Somatosensory) UE sensation intact  -SM     Row Name 11/19/21 1408          Vision Assessment/Intervention    Visual Impairment/Limitations WFL  -[facility] Name 11/19/21 1408          Range of Motion Comprehensive    Comment, General Range of Motion impaired shoulder ROM 2/2 OA. AROM limited 1/2 AROM, WFL distally.  -[facility] Name 11/19/21 1408           Strength Comprehensive (MMT)    Comment, General Manual Muscle Testing (MMT) Assessment no unilateral weakness noted,  strength equal. sonya shoulder 2/5, 3+/5 distally.  -     Row Name 11/19/21 1408          Motor Skills    Motor Skills coordination  -     Coordination WFL; bilateral; upper extremity  -     Row Name 11/19/21 1408          Balance    Static Sitting Balance WFL  -SM     Comment, Balance CGA/SBA at sink side with rollator  -SM           User Key  (r) = Recorded By, (t) = Taken By, (c) = Cosigned By    Initials Name Provider Type    SM Orquidea Ramirez OT Occupational Therapist               Goals/Plan     Row Name 11/19/21 1414          Transfer Goal 1 (OT)    Activity/Assistive Device (Transfer Goal 1, OT) transfers, all; toilet; sit-to-stand/stand-to-sit; bed-to-chair/chair-to-bed  -SM     [place] Level/Cues Needed (Transfer Goal 1, OT) standby assist  -SM     Time Frame (Transfer Goal 1, OT) short term goal (STG); 2 weeks  -SM     Progress/Outcome (Transfer Goal 1, OT) goal ongoing  -     Row Name 11/19/21 1414          Bathing Goal 1 (OT)    Activity/Device (Bathing Goal 1, OT) bathing skills, all  -SM     [place] Level/Cues Needed (Bathing Goal 1, OT) standby assist  -SM     Time Frame (Bathing Goal 1, OT) short term goal (STG); 2 weeks  -SM     Progress/Outcomes (Bathing Goal 1, OT) goal ongoing  -     Row Name 11/19/21 1414          Dressing Goal 1 (OT)    Activity/Device (Dressing Goal 1, OT) dressing skills, all  -SM     [place]/Cues Needed (Dressing Goal 1, OT) standby assist  -SM     Time Frame (Dressing Goal 1, OT) short term goal (STG); 2 weeks  -SM     Strategies/Barriers (Dressing Goal 1, OT) may benefit from LH AE.  -SM     Progress/Outcome (Dressing Goal 1, OT) goal ongoing  -     Row Name 11/19/21 1414          Toileting Goal 1 (OT)    Activity/Device (Toileting Goal 1, OT) toileting skills, all  -SM     [place] Level/Cues Needed (Toileting Goal 1,  OT) standby assist  -     Time Frame (Toileting Goal 1, OT) short term goal (STG); 2 weeks  -     Progress/Outcome (Toileting Goal 1, OT) goal ongoing  -     Row Name 11/19/21 2627          Therapy Assessment/Plan (OT)    Planned Therapy Interventions (OT) activity tolerance training; adaptive equipment training; BADL retraining; functional balance retraining; IADL retraining; occupation/activity based interventions; patient/caregiver education/training; ROM/therapeutic exercise; strengthening exercise; transfer/mobility retraining  -           User Key  (r) = Recorded By, (t) = Taken By, (c) = Cosigned By    Initials Name Provider Type     Orquidea Ramirez OT Occupational Therapist               Clinical Impression     Row Name 11/19/21 3781          Pain Assessment    Additional Documentation Pain Scale: Numbers Pre/Post-Treatment (Group)  -     Row Name 11/19/21 2404          Pain Scale: Numbers Pre/Post-Treatment    Pretreatment Pain Rating 0/10 - no pain  -     Row Name 11/19/21 2823          Plan of Care Review    Plan of Care Reviewed With patient  -     Outcome Summary Pt is a 87 y.o female admitted to [facility] s/p syncope episode at home, she fell on floor and was unable to get self up 2/2 weakness, remained on floor overnight until nephew founf her in the morning. In ED was found to have elevated troponin. In ED rapid response called for stroke like symptoms which quickly resolved. MRI (-) for stroke. [facility]- was living alone prior to admission. Pt today requires min/CGA for transfers and mobility to bathroom with rollator. CGA for toileting and standing grooming. Does have difficulty with LBD this date and requires min A for brief and max A for socks. Recommend continue OT to increase her independence with ADL. anticipate dc to [facility] with continued OT services.  -     Row Name 11/19/21 1644          Therapy Assessment/Plan (OT)    Rehab Potential  (OT) good, to achieve stated therapy goals  -     Criteria for Skilled Therapeutic Interventions Met (OT) yes; skilled treatment is necessary  -     Therapy Frequency (OT) 5 times/wk  -     Row Name 11/19/21 1409          Therapy Plan Review/Discharge Plan (OT)    Anticipated Discharge Disposition (OT) assisted living  -     Row Name 11/19/21 1409          Vital Signs    Pre Systolic BP Rehab 148  -SM     Pre Treatment Diastolic BP 96  sitting  -SM     Intra Systolic BP Rehab 137  -SM     Intra Treatment Diastolic BP 75  standing  -     Row Name 11/19/21 1409          Positioning and Restraints    Pre-Treatment Position in bed  -SM     Post Treatment Position chair  -SM     In Chair reclined; call light within reach; encouraged to call for assist; exit alarm on; notified nsg; with family/caregiver  -           User Key  (r) = Recorded By, (t) = Taken By, (c) = Cosigned By    Initials Name Provider Type    SM Orquidea Ramirez, OT Occupational Therapist               Outcome Measures     Row Name 11/19/21 1415          How much help from another is currently needed...    Putting on and taking off regular lower body clothing? 2  -SM     Bathing (including washing, rinsing, and drying) 3  -SM     Toileting (which includes using toilet bed pan or urinal) 3  -SM     Putting on and taking off regular upper body clothing 3  -SM     Taking care of personal grooming (such as brushing teeth) 3  -SM     Eating meals 4  -SM     AM-PAC 6 Clicks Score (OT) 18  -SM     Row Name 11/19/21 1106          How much help from another person do you currently need...    Turning from your back to your side while in flat bed without using bedrails? 3  -EM (r) KS (t) EM (c)     Moving from lying on back to sitting on the side of a flat bed without bedrails? 2  -EM (r) KS (t) EM (c)     Moving to and from a bed to a chair (including a wheelchair)? 3  -EM (r) KS (t) EM (c)     Standing up from a chair using your arms (e.g.,  wheelchair, bedside chair)? 3  -EM (r) KS (t) EM (c)     Climbing 3-5 steps with a railing? 2  -EM (r) KS (t) EM (c)     To walk in hospital room? 3  -EM (r) KS (t) EM (c)     AM-PAC 6 Clicks Score (PT) 16  -EM (r) KS (t)     Row Name 11/19/21 1415          Modified [place] Scale    Modified [place] Scale 0 - No Symptoms at all.  -     Row Name 11/19/21 1415 11/19/21 1106       Functional Assessment    Outcome Measure Options AM-PAC 6 Clicks Daily Activity (OT); Modified [place]  - AM-PAC 6 Clicks Basic Mobility (PT)  -EM (r) KS (t) EM (c)          User Key  (r) = Recorded By, (t) = Taken By, (c) = Cosigned By    Initials Name Provider Type    Ibeth Mercado, PT Physical Therapist    Orquidea Silva, OT Occupational Therapist    Faustino Haro, PT Student PT Student                Occupational Therapy Education                 Title: PT OT SLP Therapies (In Progress)     Topic: Occupational Therapy (In Progress)     Point: ADL training (Done)     Description:   Instruct learner(s) on proper safety adaptation and remediation techniques during self care or transfers.   Instruct in proper use of assistive devices.              Learning Progress Summary           Patient Acceptance, E, VU by  at 11/19/2021 1415    Comment: role of OT and POC/goals, safety with transfer to commode.                   Point: Home exercise program (Not Started)     Description:   Instruct learner(s) on appropriate technique for monitoring, assisting and/or progressing therapeutic exercises/activities.              Learner Progress:  Not documented in this visit.          Point: Precautions (Not Started)     Description:   Instruct learner(s) on prescribed precautions during self-care and functional transfers.              Learner Progress:  Not documented in this visit.          Point: Body mechanics (Not Started)     Description:   Instruct learner(s) on proper positioning and spine alignment during self-care,  functional mobility activities and/or exercises.              Learner Progress:  Not documented in this visit.                      User Key     Initials Effective Dates Name Provider Type Discipline     04/02/20 -  Orquidea Ramirez, MAURA Occupational Therapist OT              OT Recommendation and Plan  Planned Therapy Interventions (OT): activity tolerance training, adaptive equipment training, BADL retraining, functional balance retraining, IADL retraining, occupation/activity based interventions, patient/caregiver education/training, ROM/therapeutic exercise, strengthening exercise, transfer/mobility retraining  Therapy Frequency (OT): 5 times/wk  Plan of Care Review  Plan of Care Reviewed With: patient  Outcome Summary: Pt is a 87 y.o female admitted to [facility] s/p syncope episode at home, she fell on floor and was unable to get self up 2/2 weakness, remained on floor overnight until nephew founf her in the morning. In ED was found to have elevated troponin. In ED rapid response called for stroke like symptoms which quickly resolved. MRI (-) for stroke. Pt was planning on transitioning to residential this weekend- was living alone prior to admission. Pt today requires min/CGA for transfers and mobility to bathroom with rollator. CGA for toileting and standing grooming. Does have difficulty with LBD this date and requires min A for brief and max A for socks. Recommend continue OT to increase her independence with ADL. anticipate dc to [facility] with continued OT services.     Time Calculation:    Time Calculation- OT     Row Name 11/19/21 1416             Time Calculation- OT    OT Start Time 1020  -      OT Stop Time 1047  -      OT Time Calculation (min) 27 min  -      Total Timed Code Minutes- OT 17 minute(s)  -      OT Received On 11/19/21  -      OT - Next Appointment 11/21/21  -      OT Goal Re-Cert Due Date 12/03/21  -              Timed Charges    47722 - OT Self Care/Mgmt Minutes 17  -               Untimed Charges    OT Eval/Re-eval Minutes 10  -SM              Total Minutes    Timed Charges Total Minutes 17  -SM      Untimed Charges Total Minutes 10  -SM       Total Minutes 27  -SM            User Key  (r) = Recorded By, (t) = Taken By, (c) = Cosigned By    Initials Name Provider Type    Orquidea Silva OT Occupational Therapist              Therapy Charges for Today     Code Description Service Date Service Provider Modifiers Qty    [id number]  OT EVAL MOD COMPLEXITY 2 11/19/2021 Orquidea Ramirez OT GO 1    [id number]  OT SELF CARE/MGMT/TRAIN EA 15 MIN 11/19/2021 Orquidea Ramirez OT GO 1               Orquidea Ramirez OT  11/19/2021

## 2021-11-19 NOTE — CONSULTS
[facility]  Cardiology Consult Note    Patient Identification:  Name: Verona Avila  Age: 87 y.o.  Sex: female  :  1934  MRN: [id number]             Requesting Physician:     Reason for Consultation / Chief Complaint: Elevated troponin    History of Present Illness:   This is a 87-year-old female who is new to our service.  She has a history to include hypertension, hypothyroidism, depression, ostial arthritis, and rheumatoid arthritis.  She comes in consult for elevated troponin without any chest pain.  She presented to the emergency room with a syncopal episode and weakness.  She and her nephew stated that she was getting ready to go into a assisted living facility prior to this occurrence.  She has been really weak lately and not eating very well. No prior ischemic work-up noted.  Neurology following for strokelike symptoms.    EKG showed sinus rhythm, RBB, and PAC's with heart rate of 95.  Showed blood pressure 129/81, heart rate 88 and afebrile.  Troponin 0 0.085, potassium 3.5, ALT 18, hemoglobin 12.2, WBC 15.52, platelets 477, AST 46, TSH 0.141, , HDL 30, LDL 82 and triglycerides 101.  Chest x-ray showed no focal pulmonary consolidation.  Borderline heart size and tortuous aorta.  CT showed no acute intracranial findings.  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 Mild narrowing is seen within the proximal left internal carotid artery, in the range of 30%, see full report as above. MRI pending.    Comorbid cardiac risk factors: hyperlipidemia, age    Past Medical History:  Past Medical History:   Diagnosis Date    Arthritis     Depression     Disease of thyroid gland     Hypertension      Past Surgical History:  Past Surgical History:   Procedure Laterality Date    THYROID SURGERY      TONSILLECTOMY        Allergies:  No Known Allergies  Home Meds:  Medications Prior to Admission   Medication Sig Dispense Refill Last Dose     atorvastatin (LIPITOR) 20 MG tablet Take 20 mg by mouth daily.       hydroxychloroquine (PLAQUENIL) 200 MG tablet Take  by mouth daily.       levothyroxine (SYNTHROID, LEVOTHROID) 50 MCG tablet Take 50 mcg by mouth daily.       olmesartan-hydrochlorothiazide (BENICAR HCT) 40-12.5 MG per tablet Take 1 tablet by mouth daily.       PARoxetine (PAXIL) 20 MG tablet Take 20 mg by mouth Every Night.       HYDROcodone-acetaminophen (NORCO) 5-325 MG per tablet Take 1/2 to 1 tablet q6h as needed for pain. 12 tablet 0      Current Meds:   Scheduled    Medication Ordered Dose/Rate, Route, Frequency Last Action   aspirin chewable tablet 81 mg 81 mg, PO, Daily Ordered   atorvastatin (LIPITOR) tablet 40 mg 40 mg, PO, Nightly Ordered   hydroxychloroquine (PLAQUENIL) tablet 200 mg 200 mg, PO, Q24H Ordered   losartan (COZAAR) tablet 100 mg 100 mg, PO, Q24H Ordered   pantoprazole (PROTONIX) EC tablet 40 mg 40 mg, PO, Q AM Ordered   PARoxetine (PAXIL) tablet 20 mg 20 mg, PO, Nightly Ordered   sodium chloride 0.9 % flush 10 mL 10 mL, IV, Q12H Given, 10 mL at 11/19 0020     Continuous    Medication Ordered Dose/Rate, Route, Frequency Last Action   sodium chloride 0.9 % infusion 75 mL/hr, IV, Continuous Currently Infusing, 75 mL/hr at 11/19 0925     PRN    Medication Ordered Dose/Rate, Route, Frequency Last Action   HYDROcodone-acetaminophen (NORCO) 5-325 MG per tablet 1 tablet 1 tablet, PO, Q4H PRN Ordered   sodium chloride 0.9 % flush 10 mL 10 mL, IV, PRN Ordered   sodium chloride 0.9 % flush 10 mL 10 mL, IV, PRN Ordered       Social History:   Social History     Tobacco Use    Smoking status: Former Smoker    Smokeless tobacco: Never Used   Substance Use Topics    Alcohol use: No      Family History:  History reviewed. No pertinent family history.     Review of Systems  Constitutional: No wt loss, fever   Gastrointestinal: No nausea , abdominal pain  Behavioral/Psych: No insomnia or anxiety   Cardiovascular ----positive for SOB. All  "other systems reviewed and are negative               Physical Exam  /58   Pulse 82   Temp 97.4 °F (36.3 °C) (Oral)   Resp 20   Ht 154.9 cm (61\")   Wt 45.8 kg (101 lb)   SpO2 97%   BMI 19.08 kg/m²     General appearance: No acute changes   Eyes: Sclerae conjunctivae normal, pupils reactive   HENT: Atraumatic; oropharynx clear with moist mucous membranes and no mucosal ulcerations;  Neck: Trachea midline; NECK, supple, no thyromegaly or lymphadenopathy   Lungs: Normal size and shape, normal breath sounds, equal distribution of air, no rales and rhonchi   CV: S1-S2 regular, SYS murmurs, no rub, no gallop   Abdomen: Soft, nontender; no masses , no abnormal abdominal sounds   Extremities: No deformity , normal color , no peripheral edema   Skin: Normal temperature, turgor and texture; no rash, ulcers  Psych: Appropriate affect, alert and oriented to person, place and time                   Cardiographics  ECG:     Telemetry:    Echocardiogram: pending    holter  SUMMARY OF CLINICAL FINDINGS:   1. Sinus rhythm, average heart rate of 67 beats per minute (minimum of 53 beats per minute  and maximum of 98 beats per minute).   2. Rare premature ventricular complexes.   3. Rare premature atrial complex.   4. One episode of sinus arrhythmia that lasted four beats.   5. There was no complex supraventricular or ventricular ectopy.   6. There were no bradycardic events noted during the time monitored.   7. The patient did not complain of any symptoms.     Signatures   Electronically signed by : Magali Moore, ; Jul 1 2013  4:09PM (Author)       Imaging  Chest X-ray:   Study Result    Narrative & Impression   XR CHEST 1 VW-     HISTORY: Female who is 87 years-old,  weakness     TECHNIQUE: Frontal views of the chest     COMPARISON: 6/29/2020     FINDINGS: The heart size is borderline. Pulmonary vasculature is  unremarkable. Aorta is tortuous. No focal pulmonary consolidation,  pleural effusion, or pneumothorax is seen, " apices are slightly excluded  from the images. S-shaped thoracolumbar scoliosis is noted. No acute  osseous process.     IMPRESSION:  No focal pulmonary consolidation. Borderline heart size.  Tortuous aorta. Follow-up as clinically indicated.     This report was finalized on 11/18/2021 1:58 PM by Dr. Morro Baker M.D.          Lab Review   Results from last 7 days   Lab Units 11/19/21  0407 11/18/21  1515 11/18/21  1410   CK TOTAL U/L 360*  --   --    TROPONIN T ng/mL 0.068* 0.068* 0.085*     Results from last 7 days   Lab Units 11/18/21  1410   MAGNESIUM mg/dL 1.8     Results from last 7 days   Lab Units 11/19/21  0407   SODIUM mmol/L 139   POTASSIUM mmol/L 3.2*   BUN mg/dL 19   CREATININE mg/dL 1.07*   CALCIUM mg/dL 8.4*       Results from last 7 days   Lab Units 11/19/21  0407 11/18/21  1410   WBC 10*3/mm3 11.57* 15.52*   HEMOGLOBIN g/dL 9.8* 12.2   HEMATOCRIT % 30.0* 36.5   PLATELETS 10*3/mm3 408 477*       Intake/Output Summary (Last 24 hours) at 11/19/2021 1124  Last data filed at 11/19/2021 0900  Gross per 24 hour   Intake 0 ml   Output --   Net 0 ml           The following medical decision was discussed in detail with Dr. Fisher      Assessment:  syncope  Elevated troponin  Hypertension  Hypothyroidism  CAD    Recommendations / Plan:     This is an 87-year-old female, who is new to our service.  She has a history of hypothyroidism, and hypertension.  She comes into consult for a syncopal episode and elevated troponin.  Neurology has been consulted due to strokelike symptoms.  She and her nephew states she has been weak lately and has not been eating well.  She thinks she was getting ready for bed and had the syncopal episode.  Her nephew states she had been on the ground for a long period of time.  She was in the process of being moved to a assisted living facility.  She denies any chest pain.  Troponin's  0.085, 0.068, 0.68, and BNP 4K.  Pulmonary consolidation, borderline heart size, see full  report as above.  Blood pressure stable.  Once okay with neurology would proceed with a stress test.  She and nephew agreeable to procedure. Agree with adding Plavix 75 mg daily. Will medical manage at this time due to age, may consider a cardiac cath.    I will decrease losartan so I can add carvedilol with parameters.    Labs/tests ordered for am: holter, EKG, troponin, add Plavix 75 mg, nitro, lasix 10 mg daily.       Ibeth Patel, APRN  11/19/2021, 09:40 EST    Patient personally interviewed and above subjective findings personally confirmed during a face to face contact with patient today  All findings of physical examination confirmed  All pertinent and performed labs, cardiac procedures ,  radiographs of the last 24 hours personally reviewed  Impression and plans discussed/elaborated and implemented jointly as described above     MD CHANDAN Rodriguez/Transcription:   Dictated utilizing Dragon dictation

## 2021-11-19 NOTE — CONSULTS
Acute rehab referral received via stroke order set. Patient with NIHSS 0. Will sign off.     Thank you!    Bean Mcconnell RN  p

## 2021-11-19 NOTE — PLAN OF CARE
Goal Outcome Evaluation:  Plan of Care Reviewed With: patient        Progress: improving  Outcome Summary: Pt arrived to [facility] after a syncopal episode where pt was found down by nephew at home. Cardiac work up completed in ER and cardiology consulted. RRT called in ER before patient was transferred to [facility] r/t stroke like symptoms witnessed by nephew and symptoms assessed by RN - stroke cancelled by RRT d/t relief of symptoms - neuro consulted for further eval. CT perfusion ordered. MRI ordered for this AM. NIH scale completed this shift with neuro checks q 4 h. Pt AOx4 without signs of aphasia. Speech to eval patient today. No other changes. WCM

## 2021-11-19 NOTE — NURSING NOTE
Dr. Carcamo here, states do not move pt to neuro, ok for stress test in am from neurology standpoint.Also states ok to d/c NIHSS.

## 2021-11-19 NOTE — CASE MANAGEMENT/SOCIAL WORK
Discharge Planning Assessment  [facility]     Patient Name: Verona Avila  MRN: [id number]  Today's Date: 11/19/2021    Admit Date: 11/18/2021     Discharge Needs Assessment     Row Name 11/19/21 1651       Living Environment    Lives With alone    Current Living Arrangements home/apartment/condo    Primary Care Provided by self    Provides Primary Care For no one, unable/limited ability to care for self    Family Caregiver if Needed other relative(s)    Quality of Family Relationships helpful; involved; supportive    Able to Return to Prior Arrangements yes       Resource/Environmental Concerns    Resource/Environmental Concerns none    Transportation Concerns car, none       Transition Planning    Patient/Family Anticipates Transition to home    Patient/Family Anticipated Services at Transition home health care    Transportation Anticipated family or friend will provide       Discharge Needs Assessment    Equipment Currently Used at Home walker, rolling    Concerns to be Addressed discharge planning    Anticipated Changes Related to Illness none    Equipment Needed After Discharge none    Discharge Facility/Level of Care Needs home with home health    Current Discharge Risk physical impairment               Discharge Plan     Row Name 11/19/21 3818       Plan    Plan Plan is for home- Assisted Living at [facility].    Provided Post Acute Provider List? Yes    Post Acute Provider List Home Health; Nursing Home    Provided Post Acute Provider Quality & Resource List? Yes    Post Acute Provider Quality and Resource List Home Health    Delivered To Support Person    Support Person nikkoraymond Varsha Pride [phone]    Method of Delivery In person    Patient/Family in Agreement with Plan yes    Plan Comments Spoke to the patient’s niece Varsha Shannen [phone] who was present at the patient’s bedside. Explained role of CCP, verified facesheet, checked [place] notice and discussed discharge planning needs.  The  patient’s family plans to move the patient into her new Assisted Living apartment at [facility] upon d/c.  Madelyn was informed and stated that she would tell Wanda who follows the residents from [facility]. The patient currently resides next door in a patio home on [address].  The patient has a walker and her new apartment is on the 3rd floor however she has an elevator.  The patient’s PCP is Kelsey Prabhakar, pharmacy is [facility] on [address] and she has no trouble remembering to take her medication or with affording her medication and her niece states that they are able to get extra assistance for the patient if she has trouble with taking her medication.  The patient’s family transports her to her appointments and will transport her home upon d/c.  Discussed with the patient’s niece P.T. recommendations for HH and she states that they will discuss those options as a family and will inform CCP of their decision.  The patient’s niece was informed that a HH/SNF list and print out of the HH compare list from Medicare.gov was left in the patient’s room for their review along with the contact number for CCP.  CCP will follow up with the patient and her family to obtain their HH choice and will assist with the patient’s d/c to her assisted living apartment at [facility].  JEMMA Perez              Continued Care and Services - Admitted Since 11/18/2021     Destination Coordination complete.    Service Provider Request Status Selected Services Address Phone Fax Patient Preferred    [facility]   Selected Assisted Living [address] [address] [phone] --    [facility]  Pending - Request Sent N/A [address] [address] [phone] --                 Demographic Summary     Row Name 11/19/21 1650       General Information    Admission Type observation    Arrived From home     Referral Source admission list    Reason for Consult discharge planning    Preferred Language English     Used During This Interaction no       Contact Information    Permission Granted to Share Info With family/designee               Functional Status     Row Name 11/19/21 3464       Functional Status    Usual Activity Tolerance moderate    Current Activity Tolerance fair       Functional Status, IADL    Medications assistive equipment    Meal Preparation assistive equipment    Housekeeping assistive equipment    Laundry assistive equipment    Shopping assistive equipment       Mental Status Summary    Recent Changes in Mental Status/Cognitive Functioning unable to assess               Psychosocial    No documentation.                Abuse/Neglect    No documentation.                Legal    No documentation.                Substance Abuse    No documentation.                Patient Forms    No documentation.                   JEMMA Stephenson

## 2021-11-19 NOTE — PLAN OF CARE
Goal Outcome Evaluation:   Outcome Summary: Orders received per stroke protocol. Patient passed RN swallow screen. MRI: No evidence of acute infarction or hemorrhage. SLP to sign off at this time. Please re-consult as indicated.

## 2021-11-19 NOTE — PLAN OF CARE
Goal Outcome Evaluation:  Plan of Care Reviewed With: patient           Outcome Summary: Pt is a 87 y.o female admitted to [facility] s/p syncope episode at home, she fell on floor and was unable to get self up 2/2 weakness, remained on floor overnight until nephew founf her in the morning. In ED was found to have elevated troponin. In ED rapid response called for stroke like symptoms which quickly resolved. MRI (-) for stroke. Pt was planning on transitioning to retirement this weekend- was living alone prior to admission. Pt today requires min/CGA for transfers and mobility to bathroom with rollator. CGA for toileting and standing grooming. Does have difficulty with LBD this date and requires min A for brief and max A for socks. Recommend continue OT to increase her independence with ADL. anticipate dc to retirement with continued OT services.    Appropriate PPE worn during encounter including gloves, mask, and eye protection. Hand hygiene completed. Pt wore a mask.

## 2021-11-19 NOTE — NURSING NOTE
Spoke with Dr. Telles who states aware of thrombus on CT, states treating it medically with ASA and plavix.

## 2021-11-19 NOTE — ED NOTES
This RN was called to bedside by nephew. Nephew stated that patient was sitting in the chair talking with him when she started to have some aphasia and was mumbling and had an episode where she nodded off. Patient was mumbling and then began the end of a prayer according to nephew. When this RN at beside patient was not oriented to person, time or place, was not following commands and had a right sided facial droop along with right sided arm and leg weakness. Patient was mumbling and not making any sense but was repetotove with what she was saying. This RN called charge RN and a rapid response was called. By the time rapid response team arrive patient was back to baseline and symptoms had resolved. Call placed to DR Minor and a neuro consult was ordered.      Isabelle Wisdom, ASHLI  11/18/21 2200       Isabelle Wisdom, RN  11/18/21 3614

## 2021-11-19 NOTE — PLAN OF CARE
Goal Outcome Evaluation:  Plan of Care Reviewed With: (P) patient           Outcome Summary: (P) Pt. is a 87 year old female admitted to [facility] due to syncope episode that resulted in a fall. PMH includes HTN, OA, and RA. Pt. was agreeable to PT services today. Pt. lives alone at home but is in the process of transitioning to an assistive living facility. Pt. completed bed mobility with Lizbeth, STS transfer with Lizbeth, and ambulated 80 ft with CGA and rollator. Pt. demonstrates decreased activity tolerance and generalized weakness altering functional mobility. Skilled PT is needed to address these impairments. Agree with family plan to d/c to assistive living facility. Pt. could benefit from HHPT.    Patient was intermittently wearing a face mask during this therapy encounter. Therapist used appropriate personal protective equipment including eye protection, mask, and gloves.  Mask used was standard procedure mask. Appropriate PPE was worn during the entire therapy session. Hand hygiene was completed before and after therapy session. Patient is not in enhanced droplet precautions.

## 2021-11-19 NOTE — PLAN OF CARE
Problem: Adult Inpatient Plan of Care  Goal: Plan of Care Review  Outcome: Ongoing, Progressing   Goal Outcome Evaluation:   Had 2D echo and MRI today, passed bedside swallow eval.Holter ordered at d/c, labs/ekg in am. No c/o pain or n/v.

## 2021-11-19 NOTE — DISCHARGE PLACEMENT REQUEST
"Benito Avila (87 y.o. Female)             Date of Birth Social Security Number Address Home Phone MRN    [id number]  [id number] [address] [phone] [id number]    Shinto Marital Status             Latter day        Admission Date Admission Type Admitting Provider Attending Provider Department, Room/Bed    11/18/21 Emergency Twin Minor MD Ahmed, Aftab, MD [address], [address]    Discharge Date Discharge Disposition Discharge Destination                         Attending Provider: Twin Minor MD    Allergies: No Known Allergies    Isolation: None   Infection: None   Code Status: No CPR   Advance Care Planning Activity    Ht: 154.9 cm (61\")   Wt: 45.8 kg (101 lb)    Admission Cmt: None   Principal Problem: None                Active Insurance as of 11/18/2021     Primary Coverage     Payor Plan Insurance Group Employer/Plan Group    MEDICARE MEDICARE A & B      Payor Plan Address Payor Plan Phone Number Payor Plan Fax Number Effective Dates    [address] [phone]  8/1/1999 - None Entered    [facility] [id number]       Subscriber Name Subscriber Birth Date Member ID       BENITO AVILA 1934 [id number]           Secondary Coverage     Payor Plan Insurance Group Employer/Plan Group    [facility] SUPP [id number]     Payor Plan Address Payor Plan Phone Number Payor Plan Fax Number Effective Dates    [address]   12/1/2016 - None Entered    [facility] [id number]       Subscriber Name Subscriber Birth Date Member ID       BENITO AVILA 1934 [id number]                 Emergency Contacts      (Rel.) Home Phone Work Phone Mobile Phone    Robin Adam (Relative) [phone] -- --    Rudy Avila (Relative) [phone] -- [phone]    Shalini(nephew/POA)Mj (Relative) -- -- [phone]            {Outbreak/Travel/Exposure Documentation......;  Question Available Choices Patient Response   COVID-19 Outbreak Screen:  Do " you currently have a new onset of the following symptoms?        Fever/Chills, Cough, Shortness of air, Loss of taste or smell, No, Unknown  No (11/18/21 1246)   COVID-19 Outbreak Screen: In the last 14 days, have you had contact with anyone who is ill, has show any of the symptoms listed above and/or has been diagnosis with the 2019 Novel Coronavirus? This includes any immediate household members but excludes any patients with whom you have been in contact within your normal work duties wearing proper PPE, if you are a healthcare worker.  Yes, No, Unknown              No (11/18/21 1246)   COVID-19 Outbreak Screen: Who was notified? Free text (not recorded)   Ebola Screening Outbreak Screen: Have you traveled to the [place] or [place] within the past 21 days?  Yes, No, Unknown (not recorded)   Ebola Screening Outbreak Screen: Do you have ANY of the following symptoms: Fever/Chills, Vomiting, Diarrhea, Fatigue, Headache, Muscle pain, Unexplained bleeding, Abdominal (stomach) pain, No, Unknown (not recorded)   Ebola Screening Outbreak Screen: Name of Person notified Free text (not recorded)   Travel Screen: Have you traveled in the last month? If so, to what country have you traveled? If [place] what state? Yes, No, Unknown  List of all countries  List of all States No (11/18/21 1246)  (not recorded)  (not recorded)   Infection Risk: Do you currently have the following symptoms?  (If cough is selected, the Tuberculosis Screen is performed.) Cough, Fever, Rash, No No (11/18/21 1246)   Tuberculosis Screen: Do you have any of the following Tuberculosis Risks?  · Have you lived or spent time with anyone who had or may have TB?  · Have you lived in or visited any of the following areas for more than one month: [place], [place], [place], [place] or [place], [place] or [place]?  · Do you have HIV/AIDS?  · Have you lived in or worked in a nursing home, homeless shelter, correctional facility,  or substance abuse treatment facility?   · No    If Yes do you have any of the following symptoms? Yes responses display to the right    If Yes, symptoms listed are:  Cough greater than or equal to 3 weeks, Loss of appetite, Unexplained weight loss, Night sweats, Bloody sputum or hemoptysis, Hoarseness, Fever, Fatigue, Chest pain, No (not recorded)  (not recorded)   Exposure Screen: Have you been exposed to any of these contagious diseases in the last month? Measles, Chickenpox, Meningitis, Pertussis, Whooping Cough, No No (11/18/21 3912)

## 2021-11-19 NOTE — PROGRESS NOTES
"Daily progress note    Chief complaint  Events noted  Doing same   No new complaints  Still with back pain but denies chest pain    History of present illness  87-year-old white female with history of hypertension hypothyroidism depression osteoarthritis rheumatoid arthritis who lives by herself brought to the emergency room after she fell at home and apparently syncopal episode she does not recall what happened.  Patient unable to get up until the nephew checked on her and brought to the hospital.  Now patient is fully alert oriented does not recall how what happened and denies any chest pain shortness of breath abdominal pain nausea vomiting diarrhea.  Patient is very weak and family already work-up to take her to assisted living on Saturday.  Patient has elevated troponin with no chest pain.  Patient also denies any fever cough congestion night sweats weight gain but has been losing weight with no appetite.  Patient admitted for management and DNR per her wishes.     REVIEW OF SYSTEMS  All systems reviewed and negative except for those discussed in HPI.      PHYSICAL EXAM  Blood pressure 131/65, pulse 86, temperature 97.4 °F (36.3 °C), temperature source Oral, resp. rate 20, height 154.9 cm (61\"), weight 46.2 kg (101 lb 14.4 oz), SpO2 97 %.    General: No acute distress.  HENT: NCAT, PERRL, Nares patent.  Eyes: no scleral icterus.  Neck: trachea midline, no ROM limitations.  CV: regular rhythm, regular rate.  Respiratory: normal effort, CTAB.  Abdomen: soft, nondistended, nontender bowel sounds positive  : deferred.  Musculoskeletal: no deformity.  Neuro: alert, moves all extremities, follows commands.  Skin: warm, dry.     LAB RESULTS  Lab Results (last 24 hours)     Procedure Component Value Units Date/Time    POC Glucose Once [[id number]]  (Normal) Collected: 11/19/21 0818    Specimen: Blood Updated: 11/19/21 0819     Glucose 100 mg/dL      Comment: Meter: [id number] : [id number] Aleisha VERDE       " Hemoglobin A1c [[id number]]  (Normal) Collected: 11/19/21 0407    Specimen: Blood Updated: 11/19/21 0540     Hemoglobin A1C 5.20 %     Narrative:      Hemoglobin A1C Ranges:    Increased Risk for Diabetes  5.7% to 6.4%  Diabetes                     >= 6.5%  Diabetic Goal                < 7.0%    Troponin [[id number]]  (Abnormal) Collected: 11/19/21 0407    Specimen: Blood Updated: 11/19/21 0523     Troponin T 0.068 ng/mL     Narrative:      Troponin T Reference Range:  <= 0.03 ng/mL-   Negative for AMI  >0.03 ng/mL-     Abnormal for myocardial necrosis.  Clinicians would have to utilize clinical acumen, EKG, Troponin and serial changes to determine if it is an Acute Myocardial Infarction or myocardial injury due to an underlying chronic condition.       Results may be falsely decreased if patient taking Biotin.      TSH [[id number]]  (Abnormal) Collected: 11/19/21 0407    Specimen: Blood Updated: 11/19/21 0518     TSH 0.123 uIU/mL     BNP [[id number]]  (Abnormal) Collected: 11/19/21 0407    Specimen: Blood Updated: 11/19/21 0518     proBNP 4,967.0 pg/mL     Narrative:      Among patients with dyspnea, NT-proBNP is highly sensitive for the detection of acute congestive heart failure. In addition NT-proBNP of <300 pg/ml effectively rules out acute congestive heart failure with 99% negative predictive value.    Results may be falsely decreased if patient taking Biotin.      Comprehensive Metabolic Panel [[id number]]  (Abnormal) Collected: 11/19/21 0407    Specimen: Blood Updated: 11/19/21 0509     Glucose 102 mg/dL      BUN 19 mg/dL      Creatinine 1.07 mg/dL      Sodium 139 mmol/L      Potassium 3.2 mmol/L      Chloride 104 mmol/L      CO2 24.1 mmol/L      Calcium 8.4 mg/dL      Total Protein 6.0 g/dL      Albumin 3.00 g/dL      ALT (SGPT) 17 U/L      AST (SGOT) 32 U/L      Alkaline Phosphatase 82 U/L      Total Bilirubin 0.2 mg/dL      eGFR Non African Amer 49 mL/min/1.73      Globulin 3.0 gm/dL      A/G Ratio 1.0 g/dL       BUN/Creatinine Ratio 17.8     Anion Gap 10.9 mmol/L     Narrative:      GFR Normal >60  Chronic Kidney Disease <60  Kidney Failure <15      CK [[id number]]  (Abnormal) Collected: 11/19/21 0407    Specimen: Blood Updated: 11/19/21 0508     Creatine Kinase 360 U/L     Lipid Panel [[id number]]  (Abnormal) Collected: 11/19/21 0407    Specimen: Blood Updated: 11/19/21 0508     Total Cholesterol 131 mg/dL      Triglycerides 101 mg/dL      HDL Cholesterol 30 mg/dL      LDL Cholesterol  82 mg/dL      VLDL Cholesterol 19 mg/dL      LDL/HDL Ratio 2.69    Narrative:      Cholesterol Reference Ranges  (U.S. Department of Health and Human Services ATP III Classifications)    Desirable          <200 mg/dL  Borderline High    200-239 mg/dL  High Risk          >240 mg/dL      Triglyceride Reference Ranges  (U.S. Department of Health and Human Services ATP III Classifications)    Normal           <150 mg/dL  Borderline High  150-199 mg/dL  High             200-499 mg/dL  Very High        >500 mg/dL    HDL Reference Ranges  (U.S. Department of Health and Human Services ATP III Classifcations)    Low     <40 mg/dl (major risk factor for CHD)  High    >60 mg/dl ('negative' risk factor for CHD)        LDL Reference Ranges  (U.S. Department of Health and Human Services ATP III Classifcations)    Optimal          <100 mg/dL  Near Optimal     100-129 mg/dL  Borderline High  130-159 mg/dL  High             160-189 mg/dL  Very High        >189 mg/dL    CBC & Differential [[id number]]  (Abnormal) Collected: 11/19/21 0407    Specimen: Blood Updated: 11/19/21 0450    Narrative:      The following orders were created for panel order CBC & Differential.  Procedure                               Abnormality         Status                     ---------                               -----------         ------                     CBC Auto Differential[[id number]]        Abnormal            Final result                 Please view results for these  tests on the individual orders.    CBC Auto Differential [[id number]]  (Abnormal) Collected: 11/19/21 0407    Specimen: Blood Updated: 11/19/21 0450     WBC 11.57 10*3/mm3      RBC 3.30 10*6/mm3      Hemoglobin 9.8 g/dL      Hematocrit 30.0 %      MCV 90.9 fL      MCH 29.7 pg      MCHC 32.7 g/dL      RDW 13.9 %      RDW-SD 45.7 fl      MPV 10.3 fL      Platelets 408 10*3/mm3      Neutrophil % 69.7 %      Lymphocyte % 14.7 %      Monocyte % 11.0 %      Eosinophil % 3.5 %      Basophil % 0.4 %      Immature Grans % 0.7 %      Neutrophils, Absolute 8.07 10*3/mm3      Lymphocytes, Absolute 1.70 10*3/mm3      Monocytes, Absolute 1.27 10*3/mm3      Eosinophils, Absolute 0.40 10*3/mm3      Basophils, Absolute 0.05 10*3/mm3      Immature Grans, Absolute 0.08 10*3/mm3      nRBC 0.0 /100 WBC     COVID-19,[facility]-HOU[facility]/SAMUEL NP SWAB IN TRANSPORT MEDIA 8-12 HR TAT - Swab, Nasopharynx [[id number]]  (Normal) Collected: 11/18/21 1515    Specimen: Swab from Nasopharynx Updated: 11/18/21 1558     COVID19 Not Detected    Narrative:      Fact sheet for providers: https://www.fda.gov/media/998542/download    Fact sheet for patients: https://www.fda.gov/media/838386/download    Test performed by PCR.    Troponin [[id number]]  (Abnormal) Collected: 11/18/21 1515    Specimen: Blood Updated: 11/18/21 1558     Troponin T 0.068 ng/mL     Narrative:      Troponin T Reference Range:  <= 0.03 ng/mL-   Negative for AMI  >0.03 ng/mL-     Abnormal for myocardial necrosis.  Clinicians would have to utilize clinical acumen, EKG, Troponin and serial changes to determine if it is an Acute Myocardial Infarction or myocardial injury due to an underlying chronic condition.       Results may be falsely decreased if patient taking Biotin.      Blood Culture - Blood, Arm, Right [[id number]] Collected: 11/18/21 1538    Specimen: Blood from Arm, Right Updated: 11/18/21 1550    Lactic Acid, Plasma [[id number]]  (Normal) Collected: 11/18/21 5559     "Specimen: Blood Updated: 11/18/21 1544     Lactate 1.6 mmol/L     Procalcitonin [[id number]]  (Normal) Collected: 11/18/21 1410    Specimen: Blood Updated: 11/18/21 1533     Procalcitonin 0.16 ng/mL     Narrative:      As a Marker for Sepsis (Non-Neonates):     1. <0.5 ng/mL represents a low risk of severe sepsis and/or septic shock.  2. >2 ng/mL represents a high risk of severe sepsis and/or septic shock.    As a Marker for Lower Respiratory Tract Infections that require antibiotic therapy:  PCT on Admission     Antibiotic Therapy             6-12 Hrs later  >0.5                          Strongly Recommended            >0.25 - <0.5             Recommended  0.1 - 0.25                  Discouraged                       Remeasure/reassess PCT  <0.1                         Strongly Discouraged         Remeasure/reassess PCT      As 28 day mortality risk marker: \"Change in Procalcitonin Result\" (>80% or <=80%) if Day 0 (or Day 1) and Day 4 values are available. Refer to http://www.[employer]-pct-calculator.com/    Change in PCT <=80 %   A decrease of PCT levels below or equal to 80% defines a positive change in PCT test result representing a higher risk for 28-day all-cause mortality of patients diagnosed with severe sepsis or septic shock.    Change in PCT >80 %   A decrease of PCT levels of more than 80% defines a negative change in PCT result representing a lower risk for 28-day all-cause mortality of patients diagnosed with severe sepsis or septic shock.                Blood Culture - Blood, Arm, Right [[id number]] Collected: 11/18/21 1514    Specimen: Blood from Arm, Right Updated: 11/18/21 1521    [place] Draw [[id number]] Collected: 11/18/21 1410    Specimen: Blood Updated: 11/18/21 1515    Narrative:      The following orders were created for panel order [place] Draw.  Procedure                               Abnormality         Status                     ---------                               -----------         " ------                     Green Top (Gel)[[id number]]                                  Final result               Lavender Top[[id number]]                                     Final result               Gold Top - SST[[id number]]                                   Final result               Light Blue Top[[id number]]                                   Final result                 Please view results for these tests on the individual orders.    Lavender Top [[id number]] Collected: 11/18/21 1410    Specimen: Blood Updated: 11/18/21 1515     Extra Tube hold for add-on     Comment: Auto resulted       Gold Top - SST [[id number]] Collected: 11/18/21 1410    Specimen: Blood Updated: 11/18/21 1515     Extra Tube Hold for add-ons.     Comment: Auto resulted.       Green Top (Gel) [[id number]] Collected: 11/18/21 1410    Specimen: Blood Updated: 11/18/21 1515     Extra Tube Hold for add-ons.     Comment: Auto resulted.       Light Blue Top [[id number]] Collected: 11/18/21 1410    Specimen: Blood Updated: 11/18/21 1515     Extra Tube hold for add-on     Comment: Auto resulted       Urinalysis With Microscopic If Indicated (No Culture) - Urine, Catheter [[id number]]  (Abnormal) Collected: 11/18/21 1424    Specimen: Urine, Catheter Updated: 11/18/21 1513     Color, UA Yellow     Appearance, UA Clear     pH, UA 5.5     Specific Gravity, UA 1.014     Glucose, UA Negative     Ketones, UA Trace     Bilirubin, UA Negative     Blood, UA Negative     Protein, UA Trace     Leuk Esterase, UA Negative     Nitrite, UA Negative     Urobilinogen, UA 1.0 E.U./dL    Narrative:      Urine microscopic not indicated.    TSH [[id number]]  (Abnormal) Collected: 11/18/21 1410    Specimen: Blood Updated: 11/18/21 1513     TSH 0.141 uIU/mL     T4, Free [[id number]]  (Abnormal) Collected: 11/18/21 1410    Specimen: Blood Updated: 11/18/21 1513     Free T4 1.83 ng/dL     Narrative:      Results may be falsely increased if patient taking Biotin.      Comprehensive  Metabolic Panel [[id number]]  (Abnormal) Collected: 11/18/21 1410    Specimen: Blood Updated: 11/18/21 1442     Glucose 132 mg/dL      BUN 14 mg/dL      Creatinine 1.00 mg/dL      Sodium 136 mmol/L      Potassium 3.5 mmol/L      Comment: Slight hemolysis detected by analyzer. Results may be affected.        Chloride 99 mmol/L      CO2 25.1 mmol/L      Calcium 9.5 mg/dL      Total Protein 7.7 g/dL      Albumin 3.60 g/dL      ALT (SGPT) 18 U/L      AST (SGOT) 46 U/L      Alkaline Phosphatase 113 U/L      Total Bilirubin 0.4 mg/dL      eGFR Non African Amer 52 mL/min/1.73      Globulin 4.1 gm/dL      A/G Ratio 0.9 g/dL      BUN/Creatinine Ratio 14.0     Anion Gap 11.9 mmol/L     Narrative:      GFR Normal >60  Chronic Kidney Disease <60  Kidney Failure <15      Magnesium [[id number]]  (Normal) Collected: 11/18/21 1410    Specimen: Blood Updated: 11/18/21 1442     Magnesium 1.8 mg/dL     Troponin [[id number]]  (Abnormal) Collected: 11/18/21 1410    Specimen: Blood Updated: 11/18/21 1441     Troponin T 0.085 ng/mL     Narrative:      Troponin T Reference Range:  <= 0.03 ng/mL-   Negative for AMI  >0.03 ng/mL-     Abnormal for myocardial necrosis.  Clinicians would have to utilize clinical acumen, EKG, Troponin and serial changes to determine if it is an Acute Myocardial Infarction or myocardial injury due to an underlying chronic condition.       Results may be falsely decreased if patient taking Biotin.      CBC & Differential [[id number]]  (Abnormal) Collected: 11/18/21 1410    Specimen: Blood Updated: 11/18/21 1419    Narrative:      The following orders were created for panel order CBC & Differential.  Procedure                               Abnormality         Status                     ---------                               -----------         ------                     CBC Auto Differential[[id number]]        Abnormal            Final result                 Please view results for these tests on the individual orders.     CBC Auto Differential [[id number]]  (Abnormal) Collected: 11/18/21 1410    Specimen: Blood Updated: 11/18/21 1419     WBC 15.52 10*3/mm3      RBC 4.05 10*6/mm3      Hemoglobin 12.2 g/dL      Hematocrit 36.5 %      MCV 90.1 fL      MCH 30.1 pg      MCHC 33.4 g/dL      RDW 13.8 %      RDW-SD 44.0 fl      MPV 10.2 fL      Platelets 477 10*3/mm3      Neutrophil % 79.7 %      Lymphocyte % 9.8 %      Monocyte % 8.2 %      Eosinophil % 0.4 %      Basophil % 0.3 %      Immature Grans % 1.6 %      Neutrophils, Absolute 12.36 10*3/mm3      Lymphocytes, Absolute 1.52 10*3/mm3      Monocytes, Absolute 1.28 10*3/mm3      Eosinophils, Absolute 0.06 10*3/mm3      Basophils, Absolute 0.05 10*3/mm3      Immature Grans, Absolute 0.25 10*3/mm3      nRBC 0.0 /100 WBC         Imaging Results (Last 24 Hours)     Procedure Component Value Units Date/Time    MRI Brain Without Contrast [[id number]] Resulted: 11/19/21 0943     Updated: 11/19/21 1004    CT CEREBRAL PERFUSION WITH & WITHOUT CONTRAST [[id number]] Collected: 11/19/21 0101     Updated: 11/19/21 0202    Narrative:      NONCONTRAST CRANIAL CT SCAN, CT ANGIOGRAM NECK, CT ANGIOGRAM HEAD,  CRANIAL CT PERFUSION.     HISTORY: Aphasia and right-sided weakness.     COMPARISON: 11/18/2021.     TECHNIQUE:  Radiation dose reduction techniques were utilized, including  automated exposure control and exposure modulation based on body size.   Initially, a routine noncontrast cranial CT performed from the skull  base through the vertex region. After review, thin-section contrast  enhanced CT angiogram images obtained from the aortic arch through the  calvarial vertex utilizing angiographic technique. Multi projection 3-D  MIP reformatted images were supplemented and reviewed. Subsequently, CT  perfusion imaging performed with ischemia view software. This  examination was not performed as part of a Team D protocol.     FINDINGS CRANIAL CT: No acute hemorrhage or midline shift is  demonstrated..   There is diffuse atrophy. There is periventricular and  deep white matter microangiopathic change.  There may be an old lacunar  infarct within the anterior limb of the left internal capsule. On  postcontrast imaging, there is no evidence of venous occlusion or  abnormal enhancement Bone window images demonstrate extensive mucosal  thickening within the ethmoid sinuses, as well as partial opacification  of the right sphenoid sinus.     CERVICAL CAROTID CT ANGIOGRAM:     FINDINGS: There is a common origin of the brachiocephalic artery and  left common carotid artery. There is atherosclerotic involvement of the  thoracic aorta. 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 This is suspected to be at least 50%. Full  assessment is degraded by streak artifact from the contrast bolus. There  is atherosclerotic plaque which is noted at the right carotid  bifurcation. This results in narrowing in the range of about 50%.  Additional ulcerated plaque is seen within the proximal right internal  carotid artery, although it is not resulting in any hemodynamically  significant stenosis. Distal cervical right internal carotid artery is  widely patent. There is additional plaque which is noted at the left  carotid bifurcation, including a pedunculated thrombus, which extends  into the lumen. Plaque extends into the origins of the left internal  carotid artery. It appears results in some narrowing in the range of  about 30%. Distal cervical left internal carotid artery is widely  patent. The left vertebral artery is dominant. Right vertebral artery  does appear to remain patent. I do think there is some narrowing at the  origin of the left vertebral artery, which may result in  mild-to-moderate narrowing. There is additional atherosclerotic plaque  is noted within the cervical left vertebral artery, which results in  mild to moderate narrowing. Images through the lung apices  demonstrate  background emphysematous changes. There are degenerative changes of the  spine. There is anterolisthesis of C3 on C4 and C4-C5, as well as  retrolisthesis at C5-C6. There does appear to be some canal narrowing at  C5-C6 and C6-C7. Neural foraminal narrowing is noted at multiple levels.  There is no prevertebral soft tissue swelling.        NASCET criteria utilized in stenosis measurements. Stenosis mild, 0-49%.        CRANIAL CTA ANGIOGRAM:     FINDINGS:  There is calcification of the cavernous carotid arteries. No  anterior communicating artery is seen. The anterior cerebral arteries  are patent bilaterally. Middle cerebral arteries are unremarkable  bilaterally.  The intracranial right vertebral artery is small in  caliber. There is some mild narrowing of the intracranial left vertebral  artery. It is the dominant supply to the basilar artery. The basilar  artery is small in caliber. However,  there is a fetal origin of the  left posterior cerebral artery. I think there is an atretic right P1,  and a well-developed posterior communicating artery on the right. There  is a bulbous appearance to the distal basilar artery, although this may  be related to origins of the superior cerebellar arteries and the  atretic right P1. It measures up to 4 mm.     CT PERFUSION:     FINDINGS:  No areas of cerebral blood flow less than 30% or Tmax greater  than 6 seconds are identified.        AI analysis of LVO was utilized.     Radiation dose reduction techniques were utilized, including automated  exposure control and exposure modulation based on body size.          Impression:      1. No acute intracranial findings.  2. No abnormality seen on perfusion imaging.  3. The patient has atherosclerotic involvement noted at the carotid  bifurcations bilaterally. This results in approximately 50% stenosis on  the right. In addition, the patient has some pedunculated thrombus at  the left carotid bifurcation, with extension  into the lumen. Mild  narrowing is seen within the proximal left internal carotid artery, in  the range of 30%.  4. There is mild-to-moderate narrowing at the origin of the left  vertebral artery, with an additional area of mild segmental narrowing  noted within the cervical left vertebral artery  5. The middle cerebral and anterior cerebral arteries are unremarkable  bilaterally.  6. Bulbous appearance to the distal basilar artery may be related to the  infundibula of the superior cerebellar arteries, as well as the atretic  right P1, although small 4 mm fusiform aneurysm is not excluded.        Radiation dose reduction techniques were utilized, including automated  exposure control and exposure modulation based on body size.     This report was finalized on 11/19/2021 1:59 AM by Dr. Jojo Gay M.D.       CT Angiogram Carotids [[id number]] Collected: 11/19/21 0101     Updated: 11/19/21 0202    Narrative:      NONCONTRAST CRANIAL CT SCAN, CT ANGIOGRAM NECK, CT ANGIOGRAM HEAD,  CRANIAL CT PERFUSION.     HISTORY: Aphasia and right-sided weakness.     COMPARISON: 11/18/2021.     TECHNIQUE:  Radiation dose reduction techniques were utilized, including  automated exposure control and exposure modulation based on body size.   Initially, a routine noncontrast cranial CT performed from the skull  base through the vertex region. After review, thin-section contrast  enhanced CT angiogram images obtained from the aortic arch through the  calvarial vertex utilizing angiographic technique. Multi projection 3-D  MIP reformatted images were supplemented and reviewed. Subsequently, CT  perfusion imaging performed with ischemia view software. This  examination was not performed as part of a Team D protocol.     FINDINGS CRANIAL CT: No acute hemorrhage or midline shift is  demonstrated..  There is diffuse atrophy. There is periventricular and  deep white matter microangiopathic change.  There may be an old lacunar  infarct  within the anterior limb of the left internal capsule. On  postcontrast imaging, there is no evidence of venous occlusion or  abnormal enhancement Bone window images demonstrate extensive mucosal  thickening within the ethmoid sinuses, as well as partial opacification  of the right sphenoid sinus.     CERVICAL CAROTID CT ANGIOGRAM:     FINDINGS: There is a common origin of the brachiocephalic artery and  left common carotid artery. There is atherosclerotic involvement of the  thoracic aorta. 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 This is suspected to be at least 50%. Full  assessment is degraded by streak artifact from the contrast bolus. There  is atherosclerotic plaque which is noted at the right carotid  bifurcation. This results in narrowing in the range of about 50%.  Additional ulcerated plaque is seen within the proximal right internal  carotid artery, although it is not resulting in any hemodynamically  significant stenosis. Distal cervical right internal carotid artery is  widely patent. There is additional plaque which is noted at the left  carotid bifurcation, including a pedunculated thrombus, which extends  into the lumen. Plaque extends into the origins of the left internal  carotid artery. It appears results in some narrowing in the range of  about 30%. Distal cervical left internal carotid artery is widely  patent. The left vertebral artery is dominant. Right vertebral artery  does appear to remain patent. I do think there is some narrowing at the  origin of the left vertebral artery, which may result in  mild-to-moderate narrowing. There is additional atherosclerotic plaque  is noted within the cervical left vertebral artery, which results in  mild to moderate narrowing. Images through the lung apices demonstrate  background emphysematous changes. There are degenerative changes of the  spine. There is anterolisthesis of C3 on C4 and C4-C5, as well  as  retrolisthesis at C5-C6. There does appear to be some canal narrowing at  C5-C6 and C6-C7. Neural foraminal narrowing is noted at multiple levels.  There is no prevertebral soft tissue swelling.        NASCET criteria utilized in stenosis measurements. Stenosis mild, 0-49%.        CRANIAL CTA ANGIOGRAM:     FINDINGS:  There is calcification of the cavernous carotid arteries. No  anterior communicating artery is seen. The anterior cerebral arteries  are patent bilaterally. Middle cerebral arteries are unremarkable  bilaterally.  The intracranial right vertebral artery is small in  caliber. There is some mild narrowing of the intracranial left vertebral  artery. It is the dominant supply to the basilar artery. The basilar  artery is small in caliber. However,  there is a fetal origin of the  left posterior cerebral artery. I think there is an atretic right P1,  and a well-developed posterior communicating artery on the right. There  is a bulbous appearance to the distal basilar artery, although this may  be related to origins of the superior cerebellar arteries and the  atretic right P1. It measures up to 4 mm.     CT PERFUSION:     FINDINGS:  No areas of cerebral blood flow less than 30% or Tmax greater  than 6 seconds are identified.        AI analysis of LVO was utilized.     Radiation dose reduction techniques were utilized, including automated  exposure control and exposure modulation based on body size.          Impression:      1. No acute intracranial findings.  2. No abnormality seen on perfusion imaging.  3. The patient has atherosclerotic involvement noted at the carotid  bifurcations bilaterally. This results in approximately 50% stenosis on  the right. In addition, the patient has some pedunculated thrombus at  the left carotid bifurcation, with extension into the lumen. Mild  narrowing is seen within the proximal left internal carotid artery, in  the range of 30%.  4. There is mild-to-moderate  narrowing at the origin of the left  vertebral artery, with an additional area of mild segmental narrowing  noted within the cervical left vertebral artery  5. The middle cerebral and anterior cerebral arteries are unremarkable  bilaterally.  6. Bulbous appearance to the distal basilar artery may be related to the  infundibula of the superior cerebellar arteries, as well as the atretic  right P1, although small 4 mm fusiform aneurysm is not excluded.        Radiation dose reduction techniques were utilized, including automated  exposure control and exposure modulation based on body size.     This report was finalized on 11/19/2021 1:59 AM by Dr. Jojo Gay M.D.       CT Angiogram Head w AI Analysis of LVO [[id number]] Collected: 11/19/21 0101     Updated: 11/19/21 0202    Narrative:      NONCONTRAST CRANIAL CT SCAN, CT ANGIOGRAM NECK, CT ANGIOGRAM HEAD,  CRANIAL CT PERFUSION.     HISTORY: Aphasia and right-sided weakness.     COMPARISON: 11/18/2021.     TECHNIQUE:  Radiation dose reduction techniques were utilized, including  automated exposure control and exposure modulation based on body size.   Initially, a routine noncontrast cranial CT performed from the skull  base through the vertex region. After review, thin-section contrast  enhanced CT angiogram images obtained from the aortic arch through the  calvarial vertex utilizing angiographic technique. Multi projection 3-D  MIP reformatted images were supplemented and reviewed. Subsequently, CT  perfusion imaging performed with ischemia view software. This  examination was not performed as part of a Team D protocol.     FINDINGS CRANIAL CT: No acute hemorrhage or midline shift is  demonstrated..  There is diffuse atrophy. There is periventricular and  deep white matter microangiopathic change.  There may be an old lacunar  infarct within the anterior limb of the left internal capsule. On  postcontrast imaging, there is no evidence of venous occlusion  or  abnormal enhancement Bone window images demonstrate extensive mucosal  thickening within the ethmoid sinuses, as well as partial opacification  of the right sphenoid sinus.     CERVICAL CAROTID CT ANGIOGRAM:     FINDINGS: There is a common origin of the brachiocephalic artery and  left common carotid artery. There is atherosclerotic involvement of the  thoracic aorta. 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 This is suspected to be at least 50%. Full  assessment is degraded by streak artifact from the contrast bolus. There  is atherosclerotic plaque which is noted at the right carotid  bifurcation. This results in narrowing in the range of about 50%.  Additional ulcerated plaque is seen within the proximal right internal  carotid artery, although it is not resulting in any hemodynamically  significant stenosis. Distal cervical right internal carotid artery is  widely patent. There is additional plaque which is noted at the left  carotid bifurcation, including a pedunculated thrombus, which extends  into the lumen. Plaque extends into the origins of the left internal  carotid artery. It appears results in some narrowing in the range of  about 30%. Distal cervical left internal carotid artery is widely  patent. The left vertebral artery is dominant. Right vertebral artery  does appear to remain patent. I do think there is some narrowing at the  origin of the left vertebral artery, which may result in  mild-to-moderate narrowing. There is additional atherosclerotic plaque  is noted within the cervical left vertebral artery, which results in  mild to moderate narrowing. Images through the lung apices demonstrate  background emphysematous changes. There are degenerative changes of the  spine. There is anterolisthesis of C3 on C4 and C4-C5, as well as  retrolisthesis at C5-C6. There does appear to be some canal narrowing at  C5-C6 and C6-C7. Neural foraminal narrowing is  noted at multiple levels.  There is no prevertebral soft tissue swelling.        NASCET criteria utilized in stenosis measurements. Stenosis mild, 0-49%.        CRANIAL CTA ANGIOGRAM:     FINDINGS:  There is calcification of the cavernous carotid arteries. No  anterior communicating artery is seen. The anterior cerebral arteries  are patent bilaterally. Middle cerebral arteries are unremarkable  bilaterally.  The intracranial right vertebral artery is small in  caliber. There is some mild narrowing of the intracranial left vertebral  artery. It is the dominant supply to the basilar artery. The basilar  artery is small in caliber. However,  there is a fetal origin of the  left posterior cerebral artery. I think there is an atretic right P1,  and a well-developed posterior communicating artery on the right. There  is a bulbous appearance to the distal basilar artery, although this may  be related to origins of the superior cerebellar arteries and the  atretic right P1. It measures up to 4 mm.     CT PERFUSION:     FINDINGS:  No areas of cerebral blood flow less than 30% or Tmax greater  than 6 seconds are identified.        AI analysis of LVO was utilized.     Radiation dose reduction techniques were utilized, including automated  exposure control and exposure modulation based on body size.          Impression:      1. No acute intracranial findings.  2. No abnormality seen on perfusion imaging.  3. The patient has atherosclerotic involvement noted at the carotid  bifurcations bilaterally. This results in approximately 50% stenosis on  the right. In addition, the patient has some pedunculated thrombus at  the left carotid bifurcation, with extension into the lumen. Mild  narrowing is seen within the proximal left internal carotid artery, in  the range of 30%.  4. There is mild-to-moderate narrowing at the origin of the left  vertebral artery, with an additional area of mild segmental narrowing  noted within the  cervical left vertebral artery  5. The middle cerebral and anterior cerebral arteries are unremarkable  bilaterally.  6. Bulbous appearance to the distal basilar artery may be related to the  infundibula of the superior cerebellar arteries, as well as the atretic  right P1, although small 4 mm fusiform aneurysm is not excluded.        Radiation dose reduction techniques were utilized, including automated  exposure control and exposure modulation based on body size.     This report was finalized on 11/19/2021 1:59 AM by Dr. Jojo Gay M.D.       CT Head Without Contrast [[id number]] Collected: 11/18/21 1548     Updated: 11/18/21 1719    Narrative:      CT OF THE BRAIN WITHOUT CONTRAST 11/18/2021     HISTORY: Fell, headache, weakness.     TECHNIQUE: Axial images were obtained through the brain without  intravenous contrast.     FINDINGS: There is mild-to-moderate diffuse atrophy. There is decreased  attenuation of the periventricular white matter bilaterally consistent  with small vessel white matter ischemic disease. There are 2 small areas  of low-attenuation in the right superior parietal lobe measuring  approximately 2.2 cm and 1.9 cm and these are consistent with areas of  chronic infarct. These were also present on the 06/04/2020 study but  appear slightly larger.     There is no evidence of acute mass effect, hemorrhage or midline shift.     There is mucosal thickening in both ethmoid sinuses.       Impression:      1. Chronic ischemic changes as described.  2. No acute process identified.     Radiation dose reduction techniques were utilized, including automated  exposure control and exposure modulation based on body size.     This report was finalized on 11/18/2021 5:16 PM by Dr. Billy Rodriguez M.D.       XR Chest 1 View [[id number]] Collected: 11/18/21 1356     Updated: 11/18/21 1401    Narrative:      XR CHEST 1 VW-     HISTORY: Female who is 87 years-old,  weakness     TECHNIQUE: Frontal views of  the chest     COMPARISON: 6/29/2020     FINDINGS: The heart size is borderline. Pulmonary vasculature is  unremarkable. Aorta is tortuous. No focal pulmonary consolidation,  pleural effusion, or pneumothorax is seen, apices are slightly excluded  from the images. S-shaped thoracolumbar scoliosis is noted. No acute  osseous process.       Impression:      No focal pulmonary consolidation. Borderline heart size.  Tortuous aorta. Follow-up as clinically indicated.     This report was finalized on 11/18/2021 1:58 PM by Dr. Morro Baker M.D.                ECG 12 Lead  Component   Ref Range & Units 11/18/21 1414   QT Interval   ms 415              HEART RATE= 95  bpm  RR Interval= 648  ms  TN Interval= 126  ms  P Horizontal Axis= -8  deg  P Front Axis= 50  deg  QRSD Interval= 129  ms  QT Interval= 415  ms  QRS Axis= 9  deg  T Wave Axis= -8  deg  - ABNORMAL ECG -  Sinus rhythm  Atrial premature complex  Probable left atrial enlargement  Right bundle branch block  No change from prior tracing             Current Facility-Administered Medications:   •  aspirin chewable tablet 81 mg, 81 mg, Oral, Daily, Twin Minor MD  •  atorvastatin (LIPITOR) tablet 40 mg, 40 mg, Oral, Nightly, Benigno Bhagat MD  •  HYDROcodone-acetaminophen (NORCO) 5-325 MG per tablet 1 tablet, 1 tablet, Oral, Q4H PRN, Twin Minor MD  •  hydroxychloroquine (PLAQUENIL) tablet 200 mg, 200 mg, Oral, Q24H, Twin Minor MD  •  losartan (COZAAR) tablet 100 mg, 100 mg, Oral, Q24H, Twin Minor MD  •  pantoprazole (PROTONIX) EC tablet 40 mg, 40 mg, Oral, Q AM, Twin Minor MD  •  PARoxetine (PAXIL) tablet 20 mg, 20 mg, Oral, Nightly, Twin Minor MD  •  sodium chloride 0.9 % flush 10 mL, 10 mL, Intravenous, PRN, Domingo Ross MD  •  sodium chloride 0.9 % flush 10 mL, 10 mL, Intravenous, Q12H, Benigno Bhagat MD, 10 mL at 11/19/21 0020  •  sodium chloride 0.9 % flush 10 mL, 10 mL, Intravenous, PRN, Benigno Bhagat,  MD  •  sodium chloride 0.9 % infusion, 75 mL/hr, Intravenous, Continuous, Benigno Bhagat MD, Last Rate: 75 mL/hr at 11/19/21 0925, 75 mL/hr at 11/19/21 0925     ASSESSMENT  Syncopal episode  Elevated troponin  Osteoarthritis  Rheumatoid arthritis  Hypertension  Hyperlipidemia  Gastroesophageal reflux disease    PLAN  CPM  Serial cardiac enzymes and EKG  Check MRI of the groin  Check 2D echo  Cardiology and neurology consult pending  Stop Synthroid supplement  Adjust home medications  Stress ulcer and DVT prophylaxis  Supportive care  PT OT  DNR  Discussed with family and nursing staff  Follow closely and further recommendation according to hospital course    JONNY BOOKER MD

## 2021-11-19 NOTE — ED NOTES
RRT called for change in patient status. Pt evaluated by RRT team and page sent out for update.     Catracho Matthews, RN  11/18/21 9347

## 2021-11-19 NOTE — THERAPY EVALUATION
Patient Name: Verona Avila  : 1934    MRN: [id number]                              Today's Date: 2021       Admit Date: 2021    Visit Dx:     ICD-10-CM ICD-9-CM   1. Syncope, unspecified syncope type  R55 780.2   2. Abnormal EKG  R94.31 794.31   3. Elevated troponin  R77.8 790.6   4. Bandemia  D72.825 288.66   5. Cough  R05.9 786.2   6. Hyperthyroidism  E05.90 242.90     Patient Active Problem List   Diagnosis   • Syncope     Past Medical History:   Diagnosis Date   • Arthritis    • Depression    • Disease of thyroid gland    • Hypertension      Past Surgical History:   Procedure Laterality Date   • THYROID SURGERY     • TONSILLECTOMY        General Information     Row Name 21 1049          Physical Therapy Time and Intention    Document Type evaluation (P)   -KS     Mode of Treatment co-treatment; physical therapy; occupational therapy (P)   -KS     Row Name 21 1049          General Information    Patient Profile Reviewed yes (P)   -KS     Prior Level of Function independent:; all household mobility (P)   Uses rollator  -KS     Existing Precautions/Restrictions fall (P)   -KS     Barriers to Rehab none identified (P)   -KS     Row Name 21 1049          Living Environment    Lives With alone (P)   States her nephew comes and helps  -KS     Row Name 21 1049          Stairs Within Home, Primary    Stairs Comment, Within Home, Primary Pt. states she plans to go to an assistive living facility (P)   -KS     Row Name 21 1049          Cognition    Orientation Status (Cognition) oriented x 4 (P)   -KS     Row Name 21 1049          Safety Issues, Functional Mobility    Impairments Affecting Function (Mobility) endurance/activity tolerance; strength (P)   -KS           User Key  (r) = Recorded By, (t) = Taken By, (c) = Cosigned By    Initials Name Provider Type    Faustino Haro PT Student PT Student               Mobility     Row Name 21 1052          Bed  Mobility    Bed Mobility supine-sit (P)   -KS     Supine-Sit [place] (Bed Mobility) minimum assist (75% patient effort) (P)   -KS     Assistive Device (Bed Mobility) head of bed elevated (P)   -KS     Comment (Bed Mobility) Pt. sat up from long sitting needing slightly more assistance. (P)   -KS     Row Name 11/19/21 1052          Sit-Stand Transfer    Sit-Stand [place] (Transfers) minimum assist (75% patient effort) (P)   -KS     Assistive Device (Sit-Stand Transfers) walker, 4-wheeled (P)   -KS     Row Name 11/19/21 1052          Gait/Stairs (Locomotion)    [place] Level (Gait) contact guard (P)   -KS     Assistive Device (Gait) walker, 4-wheeled (P)   -KS     Distance in Feet (Gait) 80 ft (P)   -KS     Deviations/Abnormal Patterns (Gait) stride length decreased; gait speed decreased (P)   -KS     Bilateral Gait Deviations forward flexed posture (P)   -KS           User Key  (r) = Recorded By, (t) = Taken By, (c) = Cosigned By    Initials Name Provider Type    Faustino Haro, PT Student PT Student               Obj/Interventions     Row Name 11/19/21 1055          Range of Motion Comprehensive    General Range of Motion no range of motion deficits identified (P)   -KS     Comment, General Range of Motion ROM WFL (P)   -KS     Row Name 11/19/21 1055          Strength Comprehensive (MMT)    Comment, General Manual Muscle Testing (MMT) Assessment Generalized weakness, no focal deficits noted. (P)   -KS     Row Name 11/19/21 1055          Motor Skills    Therapeutic Exercise other (see comments) (P)   AP and LAQ x 10 seated EOB  -[facility] Name 11/19/21 1055          Balance    Balance Assessment standing static balance; sitting static balance; standing dynamic balance (P)   -KS     Static Sitting Balance WFL; sitting, edge of bed; unsupported (P)   -KS     Static Standing Balance WFL; standing; supported (P)   -KS     Dynamic Standing Balance WFL; standing; supported (P)   -KS     Row Name  11/19/21 1055          Sensory Assessment (Somatosensory)    Sensory Assessment (Somatosensory) other (see comments) (P)   Pt. states she has some numbness in B feet  -KS           User Key  (r) = Recorded By, (t) = Taken By, (c) = Cosigned By    Initials Name Provider Type    Faustino Haro PT Student PT Student               Goals/Plan     Row Name 11/19/21 1105          Bed Mobility Goal 1 (PT)    Activity/Assistive Device (Bed Mobility Goal 1, PT) bed mobility activities, all (P)   -KS     [place] Level/Cues Needed (Bed Mobility Goal 1, PT) standby assist (P)   -KS     Time Frame (Bed Mobility Goal 1, PT) 1 week (P)   -KS     Row Name 11/19/21 1105          Transfer Goal 1 (PT)    Activity/Assistive Device (Transfer Goal 1, PT) transfers, all (P)   -KS     [place] Level/Cues Needed (Transfer Goal 1, PT) standby assist (P)   -KS     Time Frame (Transfer Goal 1, PT) 1 week (P)   -KS     Row Name 11/19/21 1105          Gait Training Goal 1 (PT)    Activity/Assistive Device (Gait Training Goal 1, PT) gait (walking locomotion) (P)   -KS     [place] Level (Gait Training Goal 1, PT) contact guard assist (P)   -KS     Distance (Gait Training Goal 1, PT) 150 ft (P)   -KS     Time Frame (Gait Training Goal 1, PT) 1 week (P)   -KS           User Key  (r) = Recorded By, (t) = Taken By, (c) = Cosigned By    Initials Name Provider Type    Faustino Haro PT Student PT Student               Clinical Impression     Row Name 11/19/21 1053          Plan of Care Review    Plan of Care Reviewed With patient (P)   -KS     Outcome Summary Pt. is a 87 year old female admitted to [facility] due to syncope episode that resulted in a fall. PMH includes HTN, OA, and RA. Pt. was agreeable to PT services today. Pt. lives alone at home but is in the process of transitioning to an assistive living facility. Pt. completed bed mobility with Lizbeth, STS transfer with Lizbeth, and ambulated 80 ft with CGA and rollator. Pt.  demonstrates decreased activity tolerance and generalized weakness altering functional mobility. Skilled PT is needed to address these impairments. Agree with family plan to d/c to assistive living facility. Pt. could benefit from HHPT. (P)   -KS     Row Name 11/19/21 1057          Therapy Assessment/Plan (PT)    Patient/Family Therapy Goals Statement (PT) to go home (P)   -KS     Rehab Potential (PT) good, to achieve stated therapy goals (P)   -KS     Criteria for Skilled Interventions Met (PT) yes (P)   -KS     Row Name 11/19/21 1057          Vital Signs    Pre Systolic BP Rehab 148 (P)   -KS     Pre Treatment Diastolic BP 96 (P)   seated EOB  -KS     Intra Systolic BP Rehab 137 (P)   -KS     Intra Treatment Diastolic BP 75 (P)   standing  -KS     Row Name 11/19/21 1057          Positioning and Restraints    Pre-Treatment Position in bed (P)   -KS     Post Treatment Position chair (P)   -KS     In Chair sitting; call light within reach; encouraged to call for assist; exit alarm on; with family/caregiver (P)   -KS           User Key  (r) = Recorded By, (t) = Taken By, (c) = Cosigned By    Initials Name Provider Type    Faustino Haro, PT Student PT Student               Outcome Measures     Row Name 11/19/21 1106          How much help from another person do you currently need...    Turning from your back to your side while in flat bed without using bedrails? 3 (P)   -KS     Moving from lying on back to sitting on the side of a flat bed without bedrails? 2 (P)   -KS     Moving to and from a bed to a chair (including a wheelchair)? 3 (P)   -KS     Standing up from a chair using your arms (e.g., wheelchair, bedside chair)? 3 (P)   -KS     Climbing 3-5 steps with a railing? 2 (P)   -KS     To walk in hospital room? 3 (P)   -KS     AM-PAC 6 Clicks Score (PT) 16 (P)   -KS     Row Name 11/19/21 1106          Functional Assessment    Outcome Measure Options AM-PAC 6 Clicks Basic Mobility (PT) (P)   -KS           User  Key  (r) = Recorded By, (t) = Taken By, (c) = Cosigned By    Initials Name Provider Type    KS Faustino Lam PT Student PT Student                             Physical Therapy Education                 Title: PT OT SLP Therapies (In Progress)     Topic: Physical Therapy (In Progress)     Point: Mobility training (Done)     Learning Progress Summary           Patient Acceptance, E, VU by KS at 11/19/2021 1106                   Point: Home exercise program (Not Started)     Learner Progress:  Not documented in this visit.          Point: Body mechanics (Not Started)     Learner Progress:  Not documented in this visit.          Point: Precautions (Not Started)     Learner Progress:  Not documented in this visit.                      User Key     Initials Effective Dates Name Provider Type Discipline    KS 11/08/21 -  Faustino Lam PT Student PT Student PT              PT Recommendation and Plan  Planned Therapy Interventions (PT): (P) bed mobility training, gait training, home exercise program, strengthening, patient/family education, transfer training  Plan of Care Reviewed With: (P) patient  Outcome Summary: (P) Pt. is a 87 year old female admitted to [facility] due to syncope episode that resulted in a fall. PMH includes HTN, OA, and RA. Pt. was agreeable to PT services today. Pt. lives alone at home but is in the process of transitioning to an assistive living facility. Pt. completed bed mobility with Lizbeth, STS transfer with Lizbeth, and ambulated 80 ft with CGA and rollator. Pt. demonstrates decreased activity tolerance and generalized weakness altering functional mobility. Skilled PT is needed to address these impairments. Agree with family plan to d/c to assistive living facility. Pt. could benefit from HHPT.     Time Calculation:    PT Charges     Row Name 11/19/21 1107             Time Calculation    Start Time 1020 (P)   -KS      Stop Time 1047 (P)   -KS      Time Calculation (min) 27 min (P)   -KS      PT  Received On 11/19/21 (P)   -KS      PT - Next Appointment 11/20/21 (P)   -KS      PT Goal Re-Cert Due Date 11/26/21 (P)   -KS              Time Calculation- PT    Total Timed Code Minutes- PT 20 minute(s) (P)   -KS              Timed Charges    98820 - PT Therapeutic Activity Minutes 20 (P)   -KS              Total Minutes    Timed Charges Total Minutes 20 (P)   -KS       Total Minutes 20 (P)   -KS            User Key  (r) = Recorded By, (t) = Taken By, (c) = Cosigned By    Initials Name Provider Type    Faustino Haro PT Student PT Student              Therapy Charges for Today     Code Description Service Date Service Provider Modifiers Qty    50909678990 HC PT THERAPEUTIC ACT EA 15 MIN 11/19/2021 Faustino Lam PT Student GP 1    93916689878 HC PT EVAL MOD COMPLEXITY 2 11/19/2021 Faustino Lam PT Student GP 1          PT G-Codes  Outcome Measure Options: (P) AM-PAC 6 Clicks Basic Mobility (PT)  AM-PAC 6 Clicks Score (PT): (P) 16    Faustino Lam PT Student  11/19/2021

## 2021-11-20 ENCOUNTER — APPOINTMENT (OUTPATIENT)
Dept: CARDIOLOGY | Facility: HOSPITAL | Age: 86
End: 2021-11-20

## 2021-11-20 VITALS
HEIGHT: 61 IN | BODY MASS INDEX: 19.07 KG/M2 | WEIGHT: 101 LBS | DIASTOLIC BLOOD PRESSURE: 72 MMHG | RESPIRATION RATE: 18 BRPM | HEART RATE: 88 BPM | TEMPERATURE: 98.6 F | OXYGEN SATURATION: 97 % | SYSTOLIC BLOOD PRESSURE: 131 MMHG

## 2021-11-20 LAB
BASOPHILS # BLD AUTO: 0.06 10*3/MM3 (ref 0–0.2)
BASOPHILS NFR BLD AUTO: 0.6 % (ref 0–1.5)
DEPRECATED RDW RBC AUTO: 46.1 FL (ref 37–54)
EOSINOPHIL # BLD AUTO: 0.86 10*3/MM3 (ref 0–0.4)
EOSINOPHIL NFR BLD AUTO: 8.4 % (ref 0.3–6.2)
ERYTHROCYTE [DISTWIDTH] IN BLOOD BY AUTOMATED COUNT: 14.2 % (ref 12.3–15.4)
GLUCOSE BLDC GLUCOMTR-MCNC: 102 MG/DL (ref 70–130)
GLUCOSE BLDC GLUCOMTR-MCNC: 104 MG/DL (ref 70–130)
GLUCOSE BLDC GLUCOMTR-MCNC: 99 MG/DL (ref 70–130)
HCT VFR BLD AUTO: 26.9 % (ref 34–46.6)
HGB BLD-MCNC: 9 G/DL (ref 12–15.9)
IMM GRANULOCYTES # BLD AUTO: 0.11 10*3/MM3 (ref 0–0.05)
IMM GRANULOCYTES NFR BLD AUTO: 1.1 % (ref 0–0.5)
LYMPHOCYTES # BLD AUTO: 1.82 10*3/MM3 (ref 0.7–3.1)
LYMPHOCYTES NFR BLD AUTO: 17.7 % (ref 19.6–45.3)
MCH RBC QN AUTO: 30 PG (ref 26.6–33)
MCHC RBC AUTO-ENTMCNC: 33.5 G/DL (ref 31.5–35.7)
MCV RBC AUTO: 89.7 FL (ref 79–97)
MONOCYTES # BLD AUTO: 1.19 10*3/MM3 (ref 0.1–0.9)
MONOCYTES NFR BLD AUTO: 11.6 % (ref 5–12)
NEUTROPHILS NFR BLD AUTO: 6.24 10*3/MM3 (ref 1.7–7)
NEUTROPHILS NFR BLD AUTO: 60.6 % (ref 42.7–76)
NRBC BLD AUTO-RTO: 0 /100 WBC (ref 0–0.2)
PLATELET # BLD AUTO: 363 10*3/MM3 (ref 140–450)
PMV BLD AUTO: 10.4 FL (ref 6–12)
POTASSIUM SERPL-SCNC: 4.9 MMOL/L (ref 3.5–5.2)
QT INTERVAL: 403 MS
RBC # BLD AUTO: 3 10*6/MM3 (ref 3.77–5.28)
TROPONIN T SERPL-MCNC: 0.05 NG/ML (ref 0–0.03)
WBC NRBC COR # BLD: 10.28 10*3/MM3 (ref 3.4–10.8)

## 2021-11-20 PROCEDURE — 84484 ASSAY OF TROPONIN QUANT: CPT | Performed by: NURSE PRACTITIONER

## 2021-11-20 PROCEDURE — 99232 SBSQ HOSP IP/OBS MODERATE 35: CPT | Performed by: INTERNAL MEDICINE

## 2021-11-20 PROCEDURE — 25010000002 FUROSEMIDE PER 20 MG: Performed by: NURSE PRACTITIONER

## 2021-11-20 PROCEDURE — 93246 EXT ECG>7D<15D RECORDING: CPT

## 2021-11-20 PROCEDURE — 85025 COMPLETE CBC W/AUTO DIFF WBC: CPT | Performed by: HOSPITALIST

## 2021-11-20 PROCEDURE — 82962 GLUCOSE BLOOD TEST: CPT

## 2021-11-20 PROCEDURE — 84132 ASSAY OF SERUM POTASSIUM: CPT | Performed by: HOSPITALIST

## 2021-11-20 PROCEDURE — 93005 ELECTROCARDIOGRAM TRACING: CPT | Performed by: NURSE PRACTITIONER

## 2021-11-20 PROCEDURE — 93010 ELECTROCARDIOGRAM REPORT: CPT | Performed by: INTERNAL MEDICINE

## 2021-11-20 RX ORDER — CARVEDILOL 3.12 MG/1
3.12 TABLET ORAL 2 TIMES DAILY WITH MEALS
Qty: 60 TABLET | Refills: 0 | Status: SHIPPED | OUTPATIENT
Start: 2021-11-20

## 2021-11-20 RX ORDER — PANTOPRAZOLE SODIUM 40 MG/1
40 TABLET, DELAYED RELEASE ORAL
Qty: 30 TABLET | Refills: 0 | Status: SHIPPED | OUTPATIENT
Start: 2021-11-21

## 2021-11-20 RX ORDER — LOSARTAN POTASSIUM 25 MG/1
25 TABLET ORAL
Qty: 30 TABLET | Refills: 0 | Status: SHIPPED | OUTPATIENT
Start: 2021-11-21 | End: 2022-04-06 | Stop reason: HOSPADM

## 2021-11-20 RX ORDER — CLOPIDOGREL BISULFATE 75 MG/1
75 TABLET ORAL DAILY
Qty: 30 TABLET | Refills: 0 | Status: SHIPPED | OUTPATIENT
Start: 2021-11-21

## 2021-11-20 RX ORDER — ASPIRIN 81 MG/1
81 TABLET, CHEWABLE ORAL DAILY
Qty: 30 TABLET | Refills: 0 | Status: SHIPPED | OUTPATIENT
Start: 2021-11-21

## 2021-11-20 RX ADMIN — NITROGLYCERIN 1 INCH: 20 OINTMENT TOPICAL at 18:53

## 2021-11-20 RX ADMIN — NITROGLYCERIN 1 INCH: 20 OINTMENT TOPICAL at 05:34

## 2021-11-20 RX ADMIN — SODIUM CHLORIDE, PRESERVATIVE FREE 10 ML: 5 INJECTION INTRAVENOUS at 09:01

## 2021-11-20 RX ADMIN — NITROGLYCERIN 1 INCH: 20 OINTMENT TOPICAL at 13:31

## 2021-11-20 RX ADMIN — CARVEDILOL 3.12 MG: 3.12 TABLET, FILM COATED ORAL at 09:00

## 2021-11-20 RX ADMIN — PANTOPRAZOLE SODIUM 40 MG: 40 TABLET, DELAYED RELEASE ORAL at 05:35

## 2021-11-20 RX ADMIN — CARVEDILOL 3.12 MG: 3.12 TABLET, FILM COATED ORAL at 18:53

## 2021-11-20 RX ADMIN — FUROSEMIDE 10 MG: 10 INJECTION, SOLUTION INTRAMUSCULAR; INTRAVENOUS at 09:00

## 2021-11-20 RX ADMIN — CLOPIDOGREL 75 MG: 75 TABLET, FILM COATED ORAL at 09:01

## 2021-11-20 RX ADMIN — LOSARTAN POTASSIUM 25 MG: 25 TABLET, FILM COATED ORAL at 09:01

## 2021-11-20 RX ADMIN — ASPIRIN 81 MG: 81 TABLET, CHEWABLE ORAL at 09:00

## 2021-11-20 RX ADMIN — HYDROXYCHLOROQUINE SULFATE 200 MG: 200 TABLET ORAL at 09:00

## 2021-11-20 NOTE — PROGRESS NOTES
"CC: Syncope    Interval History: No new acute events overnight      Vital Signs  Temp:  [97.5 °F (36.4 °C)-98.1 °F (36.7 °C)] 97.9 °F (36.6 °C)  Heart Rate:  [82-95] 95  Resp:  [18] 18  BP: (102-156)/(53-80) 156/68    Intake/Output Summary (Last 24 hours) at 11/20/2021 0904  Last data filed at 11/20/2021 0700  Gross per 24 hour   Intake 420 ml   Output 300 ml   Net 120 ml     Flowsheet Rows      First Filed Value   Admission Height 154.9 cm (61\") Documented at 11/18/2021 1249   Admission Weight 48.3 kg (106 lb 8 oz) Documented at 11/18/2021 1411          PHYSICAL EXAM:  General: No acute distress, frail looking    Resp:NL Rate, symmetric chest expansion,unlabored, clear  CV:NL rate and rhythm, NL PMI, NL S1 and S2, no Murmur, no gallop, no rub, No JVD.   ABD:Nl sounds, no masses or tenderness, nondistended, no guarding or rebound  Neuro: alert,cooperative and oriented  Extr:Normal pedal pulses, No edema or cyanosis, moves all extremities      Results Review:    Results from last 7 days   Lab Units 11/19/21  0407   SODIUM mmol/L 139   POTASSIUM mmol/L 3.2*   CHLORIDE mmol/L 104   CO2 mmol/L 24.1   BUN mg/dL 19   CREATININE mg/dL 1.07*   GLUCOSE mg/dL 102*   CALCIUM mg/dL 8.4*     Results from last 7 days   Lab Units 11/20/21  0535 11/19/21  0407 11/18/21  1515   CK TOTAL U/L  --  360*  --    TROPONIN T ng/mL 0.049* 0.068* 0.068*     Results from last 7 days   Lab Units 11/20/21  0535   WBC 10*3/mm3 10.28   HEMOGLOBIN g/dL 9.0*   HEMATOCRIT % 26.9*   PLATELETS 10*3/mm3 363         Results from last 7 days   Lab Units 11/19/21  0407   CHOLESTEROL mg/dL 131     Results from last 7 days   Lab Units 11/18/21  1410   MAGNESIUM mg/dL 1.8     Results from last 7 days   Lab Units 11/19/21  0407   CHOLESTEROL mg/dL 131   TRIGLYCERIDES mg/dL 101   HDL CHOL mg/dL 30*   LDL CHOL mg/dL 82     I reviewed the patient's new clinical results.  I personally viewed and interpreted the patient's EKG/Telemetry data        Medication " Review:   Meds reviewed    sodium chloride, 50 mL/hr, Last Rate: 50 mL/hr (11/19/21 1210)        Assessment/Plan    1. Syncope   2. Elevated troponin -EKG with incomplete right bundle branch block but no ischemic changes.  Echocardiogram with preserved low ventricular ejection fraction, basal inferior wall hypokinesis, no significant valvular abnormality  3.  Hypertension-she is on olmesartan/hydrochlorothiazide 40/12.5 mg p.o. daily at home-started on losartan 25 daily here with addition of Coreg 3.125 mg p.o. twice daily  4.  Hypercholesterolemia on atorvastatin  5. CKD stage 3   6. Hypokalemia     Getting Lasix 10mg iv daily with IV fluid-I have discontinued both.  She has elevated proBNP but not overtly volume overloaded.  Normal left atrial filling pressure on echocardiogram.  Patient is walking using a walker.  She denies any significant new symptoms today and no recurrence of syncope and no arrhythmia noted on telemetry.  Her vital signs are within normal range  Patient reports passing out on the floor all night long-etiology not clear at this point-she may have had hypotensive episode.  Continue current care with beta-blocker, losartan, statin and aspirin  She can follow up with her primary cardiologist for further romero. Send pt out on 2 week Zio patch on discharge     Justin Arreugin MD  11/20/21  09:04 EST

## 2021-11-20 NOTE — PLAN OF CARE
Goal Outcome Evaluation:               VSS.Pt with discharge  orders to  assisted living([facility] ). 2 weeks ZIO patch applied per MD order.Family to  pt. btw 7- 8 pm tonight . GIFTY

## 2021-11-20 NOTE — PLAN OF CARE
Goal Outcome Evaluation:  Plan of Care Reviewed With: patient        Progress: no change  Outcome Summary: VSS. No c/o pain or SOA. Weight shift assistance provided. IVF conitnued. Possible stress test today. Dsg to L elbow C/D/I. No other changes. WCM

## 2021-11-20 NOTE — PROGRESS NOTES
"DAILY PROGRESS NOTE  [facility]    Patient Identification:  Name: Verona Avila  Age: 87 y.o.  Sex: female  :  1934  MRN: [id number]         Primary Care Physician: Kelsey Prabhakar MD    Subjective: patient is sitting up; feels better  Interval History: follow up for syncope,  Ra, hypertension    Objective:    Scheduled Meds:aspirin, 81 mg, Oral, Daily  atorvastatin, 10 mg, Oral, Nightly  carvedilol, 3.125 mg, Oral, BID With Meals  clopidogrel, 75 mg, Oral, Daily  hydroxychloroquine, 200 mg, Oral, Q24H  losartan, 25 mg, Oral, Q24H  nitroglycerin, 1 inch, Topical, Q6H  pantoprazole, 40 mg, Oral, Q AM  PARoxetine, 20 mg, Oral, Nightly  sodium chloride, 10 mL, Intravenous, Q12H      Continuous Infusions:     Vital signs in last 24 hours:  Temp:  [97.5 °F (36.4 °C)-98.1 °F (36.7 °C)] 97.9 °F (36.6 °C)  Heart Rate:  [82-95] 95  Resp:  [18-20] 20  BP: (102-156)/(51-80) 128/69    Intake/Output:    Intake/Output Summary (Last 24 hours) at 2021 1257  Last data filed at 2021 0930  Gross per 24 hour   Intake 660 ml   Output 300 ml   Net 360 ml       Exam:  /69 (BP Location: Left arm, Patient Position: Standing)   Pulse 95   Temp 97.9 °F (36.6 °C) (Oral)   Resp 20   Ht 154.9 cm (61\")   Wt 45.8 kg (101 lb)   SpO2 95%   BMI 19.08 kg/m²     General Appearance:    Alert, cooperative, no distress, AAOx3                          Head:    Normocephalic, without obvious abnormality, atraumatic                           Eyes:    PERRL, conjunctivae/corneas clear, EOM's intact, both eyes                         Throat:   Lips, tongue, gums normal; oral mucosa pink and moist                           Neck:   Supple, symmetrical, trachea midline, no JVD                         Lungs:    Clear to auscultation bilaterally, respirations unlabored                 Chest Wall:    No tenderness or deformity                          Heart:    Regular rate and rhythm, S1 and S2 normal, no murmur,no  rub " or gallop                  Abdomen:     Soft, nontender, bowel sounds active, no masses, no organomegaly                  Extremities:   Extremities normal, atraumatic, no cyanosis or edema                        Pulses:   Pulses palpable in all extremities                            Skin:   Skin is warm and dry,  no rashes or palpable lesions                  Neurologic:   CNII-XII intact, motor strength grossly intact, sensation grossly intact to light touch, no focal deficits noted       Data Review:  Labs in chart were reviewed.  WBC   Date Value Ref Range Status   11/20/2021 10.28 3.40 - 10.80 10*3/mm3 Final     Hemoglobin   Date Value Ref Range Status   11/20/2021 9.0 (L) 12.0 - 15.9 g/dL Final     Hematocrit   Date Value Ref Range Status   11/20/2021 26.9 (L) 34.0 - 46.6 % Final     Platelets   Date Value Ref Range Status   11/20/2021 363 140 - 450 10*3/mm3 Final     Sodium   Date Value Ref Range Status   11/19/2021 139 136 - 145 mmol/L Final     Potassium   Date Value Ref Range Status   11/20/2021 4.9 3.5 - 5.2 mmol/L Final     Chloride   Date Value Ref Range Status   11/19/2021 104 98 - 107 mmol/L Final     CO2   Date Value Ref Range Status   11/19/2021 24.1 22.0 - 29.0 mmol/L Final     BUN   Date Value Ref Range Status   11/19/2021 19 8 - 23 mg/dL Final     Creatinine   Date Value Ref Range Status   11/19/2021 1.07 (H) 0.57 - 1.00 mg/dL Final     Glucose   Date Value Ref Range Status   11/19/2021 102 (H) 65 - 99 mg/dL Final     Calcium   Date Value Ref Range Status   11/19/2021 8.4 (L) 8.6 - 10.5 mg/dL Final     Magnesium   Date Value Ref Range Status   11/18/2021 1.8 1.6 - 2.4 mg/dL Final     AST (SGOT)   Date Value Ref Range Status   11/19/2021 32 1 - 32 U/L Final     ALT (SGPT)   Date Value Ref Range Status   11/19/2021 17 1 - 33 U/L Final     Alkaline Phosphatase   Date Value Ref Range Status   11/19/2021 82 39 - 117 U/L Final     Patient Active Problem List   Diagnosis Code   • Syncope R55        Assessment:    Syncope  rheumatoid arthritis  Weakness  Cad      Plan:  Plan is for dc to assisted living  Family is getting things ready   Will enter dc orders in case all falls in place  If not plan for dc tomorrow  BESSIEw patient    Sharon Jas Zhu MD  11/20/2021  12:57 EST

## 2021-11-22 NOTE — CASE MANAGEMENT/SOCIAL WORK
Case Management Discharge Note      Final Note: Discharge to home Assisted Living.    Provided Post Acute Provider List?: Yes  Post Acute Provider List: Home Health, Nursing Home  Provided Post Acute Provider Quality & Resource List?: Yes  Post Acute Provider Quality and Resource List: Home Health  Delivered To: Support Person  Support Person: bunny Pride [phone]  Method of Delivery: In person    Selected Continued Care - Discharged on 11/20/2021 Admission date: 11/18/2021 - Discharge disposition: Home or Self Care    Destination Coordination complete.    Service Provider Selected Services Address Phone Fax Patient Preferred    [facility] [address] [address] [phone] --          Durable Medical Equipment    No services have been selected for the patient.              Dialysis/Infusion    No services have been selected for the patient.              Home Medical Care    No services have been selected for the patient.              Therapy    No services have been selected for the patient.              Community Resources    No services have been selected for the patient.              Community & DME    No services have been selected for the patient.                  Transportation Services  Private: Car    Final Discharge Disposition Code: 01 - home or self-care

## 2021-11-23 LAB
BACTERIA SPEC AEROBE CULT: NORMAL
BACTERIA SPEC AEROBE CULT: NORMAL

## 2022-02-02 LAB
MAXIMAL PREDICTED HEART RATE: 133 BPM
STRESS TARGET HR: 113 BPM

## 2022-02-02 PROCEDURE — 93248 EXT ECG>7D<15D REV&INTERPJ: CPT | Performed by: INTERNAL MEDICINE

## 2022-02-03 ENCOUNTER — TELEPHONE (OUTPATIENT)
Dept: CARDIOLOGY | Facility: CLINIC | Age: 87
End: 2022-02-03

## 2022-02-03 NOTE — TELEPHONE ENCOUNTER
----- Message from ADAIR Salmeron sent at 2/2/2022  3:20 PM EST -----  Please let her know her monitor was a normal monitor study.  It did show normal sinus rhythm with rare PACs and an NSSVT.  She will also need a make an appointment to follow-up with somebody in the office.        Thanks Mihir

## 2022-03-31 ENCOUNTER — APPOINTMENT (OUTPATIENT)
Dept: GENERAL RADIOLOGY | Facility: HOSPITAL | Age: 87
End: 2022-03-31

## 2022-03-31 ENCOUNTER — HOSPITAL ENCOUNTER (INPATIENT)
Facility: HOSPITAL | Age: 87
LOS: 6 days | Discharge: NURSING, REHAB OR LONG-TERM CARE | End: 2022-04-06
Attending: EMERGENCY MEDICINE | Admitting: HOSPITALIST

## 2022-03-31 ENCOUNTER — APPOINTMENT (OUTPATIENT)
Dept: CT IMAGING | Facility: HOSPITAL | Age: 87
End: 2022-03-31

## 2022-03-31 ENCOUNTER — APPOINTMENT (OUTPATIENT)
Dept: CARDIOLOGY | Facility: HOSPITAL | Age: 87
End: 2022-03-31

## 2022-03-31 DIAGNOSIS — R09.02 HYPOXIA: Primary | ICD-10-CM

## 2022-03-31 DIAGNOSIS — M54.50 ACUTE BILATERAL LOW BACK PAIN WITHOUT SCIATICA: ICD-10-CM

## 2022-03-31 LAB
ALBUMIN SERPL-MCNC: 3.9 G/DL (ref 3.5–5.2)
ALBUMIN/GLOB SERPL: 1.1 G/DL
ALP SERPL-CCNC: 154 U/L (ref 39–117)
ALT SERPL W P-5'-P-CCNC: 23 U/L (ref 1–33)
ANION GAP SERPL CALCULATED.3IONS-SCNC: 12 MMOL/L (ref 5–15)
APTT PPP: 29.9 SECONDS (ref 22.7–35.4)
AST SERPL-CCNC: 34 U/L (ref 1–32)
B PARAPERT DNA SPEC QL NAA+PROBE: NOT DETECTED
B PERT DNA SPEC QL NAA+PROBE: NOT DETECTED
BASOPHILS # BLD AUTO: 0.03 10*3/MM3 (ref 0–0.2)
BASOPHILS NFR BLD AUTO: 0.3 % (ref 0–1.5)
BH CV LOWER VASCULAR LEFT COMMON FEMORAL AUGMENT: NORMAL
BH CV LOWER VASCULAR LEFT COMMON FEMORAL COMPETENT: NORMAL
BH CV LOWER VASCULAR LEFT COMMON FEMORAL COMPRESS: NORMAL
BH CV LOWER VASCULAR LEFT COMMON FEMORAL PHASIC: NORMAL
BH CV LOWER VASCULAR LEFT COMMON FEMORAL SPONT: NORMAL
BH CV LOWER VASCULAR LEFT DISTAL FEMORAL COMPRESS: NORMAL
BH CV LOWER VASCULAR LEFT GASTRONEMIUS COMPRESS: NORMAL
BH CV LOWER VASCULAR LEFT GREATER SAPH AK COMPRESS: NORMAL
BH CV LOWER VASCULAR LEFT GREATER SAPH BK COMPRESS: NORMAL
BH CV LOWER VASCULAR LEFT LESSER SAPH COMPRESS: NORMAL
BH CV LOWER VASCULAR LEFT MID FEMORAL AUGMENT: NORMAL
BH CV LOWER VASCULAR LEFT MID FEMORAL COMPETENT: NORMAL
BH CV LOWER VASCULAR LEFT MID FEMORAL COMPRESS: NORMAL
BH CV LOWER VASCULAR LEFT MID FEMORAL PHASIC: NORMAL
BH CV LOWER VASCULAR LEFT MID FEMORAL SPONT: NORMAL
BH CV LOWER VASCULAR LEFT PERONEAL COMPRESS: NORMAL
BH CV LOWER VASCULAR LEFT POPLITEAL AUGMENT: NORMAL
BH CV LOWER VASCULAR LEFT POPLITEAL COMPETENT: NORMAL
BH CV LOWER VASCULAR LEFT POPLITEAL COMPRESS: NORMAL
BH CV LOWER VASCULAR LEFT POPLITEAL PHASIC: NORMAL
BH CV LOWER VASCULAR LEFT POPLITEAL SPONT: NORMAL
BH CV LOWER VASCULAR LEFT POSTERIOR TIBIAL COMPRESS: NORMAL
BH CV LOWER VASCULAR LEFT PROFUNDA FEMORAL COMPRESS: NORMAL
BH CV LOWER VASCULAR LEFT PROXIMAL FEMORAL COMPRESS: NORMAL
BH CV LOWER VASCULAR LEFT SAPHENOFEMORAL JUNCTION COMPRESS: NORMAL
BH CV LOWER VASCULAR RIGHT COMMON FEMORAL AUGMENT: NORMAL
BH CV LOWER VASCULAR RIGHT COMMON FEMORAL COMPETENT: NORMAL
BH CV LOWER VASCULAR RIGHT COMMON FEMORAL COMPRESS: NORMAL
BH CV LOWER VASCULAR RIGHT COMMON FEMORAL PHASIC: NORMAL
BH CV LOWER VASCULAR RIGHT COMMON FEMORAL SPONT: NORMAL
BH CV LOWER VASCULAR RIGHT DISTAL FEMORAL COMPRESS: NORMAL
BH CV LOWER VASCULAR RIGHT GASTRONEMIUS COMPRESS: NORMAL
BH CV LOWER VASCULAR RIGHT GREATER SAPH AK COMPRESS: NORMAL
BH CV LOWER VASCULAR RIGHT GREATER SAPH BK COMPRESS: NORMAL
BH CV LOWER VASCULAR RIGHT LESSER SAPH COMPRESS: NORMAL
BH CV LOWER VASCULAR RIGHT MID FEMORAL AUGMENT: NORMAL
BH CV LOWER VASCULAR RIGHT MID FEMORAL COMPETENT: NORMAL
BH CV LOWER VASCULAR RIGHT MID FEMORAL COMPRESS: NORMAL
BH CV LOWER VASCULAR RIGHT MID FEMORAL PHASIC: NORMAL
BH CV LOWER VASCULAR RIGHT MID FEMORAL SPONT: NORMAL
BH CV LOWER VASCULAR RIGHT PERONEAL COMPRESS: NORMAL
BH CV LOWER VASCULAR RIGHT POPLITEAL AUGMENT: NORMAL
BH CV LOWER VASCULAR RIGHT POPLITEAL COMPETENT: NORMAL
BH CV LOWER VASCULAR RIGHT POPLITEAL COMPRESS: NORMAL
BH CV LOWER VASCULAR RIGHT POPLITEAL PHASIC: NORMAL
BH CV LOWER VASCULAR RIGHT POPLITEAL SPONT: NORMAL
BH CV LOWER VASCULAR RIGHT POSTERIOR TIBIAL COMPRESS: NORMAL
BH CV LOWER VASCULAR RIGHT PROFUNDA FEMORAL COMPRESS: NORMAL
BH CV LOWER VASCULAR RIGHT PROXIMAL FEMORAL COMPRESS: NORMAL
BH CV LOWER VASCULAR RIGHT SAPHENOFEMORAL JUNCTION COMPRESS: NORMAL
BILIRUB SERPL-MCNC: 0.4 MG/DL (ref 0–1.2)
BUN SERPL-MCNC: 25 MG/DL (ref 8–23)
BUN/CREAT SERPL: 26 (ref 7–25)
C PNEUM DNA NPH QL NAA+NON-PROBE: NOT DETECTED
CALCIUM SPEC-SCNC: 9.1 MG/DL (ref 8.6–10.5)
CHLORIDE SERPL-SCNC: 104 MMOL/L (ref 98–107)
CO2 SERPL-SCNC: 21 MMOL/L (ref 22–29)
CREAT SERPL-MCNC: 0.96 MG/DL (ref 0.57–1)
DEPRECATED RDW RBC AUTO: 39.5 FL (ref 37–54)
EGFRCR SERPLBLD CKD-EPI 2021: 57.4 ML/MIN/1.73
EOSINOPHIL # BLD AUTO: 0.02 10*3/MM3 (ref 0–0.4)
EOSINOPHIL NFR BLD AUTO: 0.2 % (ref 0.3–6.2)
ERYTHROCYTE [DISTWIDTH] IN BLOOD BY AUTOMATED COUNT: 13.4 % (ref 12.3–15.4)
FLUAV SUBTYP SPEC NAA+PROBE: NOT DETECTED
FLUBV RNA ISLT QL NAA+PROBE: NOT DETECTED
GLOBULIN UR ELPH-MCNC: 3.6 GM/DL
GLUCOSE SERPL-MCNC: 118 MG/DL (ref 65–99)
HADV DNA SPEC NAA+PROBE: NOT DETECTED
HCOV 229E RNA SPEC QL NAA+PROBE: NOT DETECTED
HCOV HKU1 RNA SPEC QL NAA+PROBE: NOT DETECTED
HCOV NL63 RNA SPEC QL NAA+PROBE: NOT DETECTED
HCOV OC43 RNA SPEC QL NAA+PROBE: NOT DETECTED
HCT VFR BLD AUTO: 35.4 % (ref 34–46.6)
HGB BLD-MCNC: 11.5 G/DL (ref 12–15.9)
HMPV RNA NPH QL NAA+NON-PROBE: NOT DETECTED
HOLD SPECIMEN: NORMAL
HPIV1 RNA ISLT QL NAA+PROBE: NOT DETECTED
HPIV2 RNA SPEC QL NAA+PROBE: NOT DETECTED
HPIV3 RNA NPH QL NAA+PROBE: NOT DETECTED
HPIV4 P GENE NPH QL NAA+PROBE: NOT DETECTED
IMM GRANULOCYTES # BLD AUTO: 0.04 10*3/MM3 (ref 0–0.05)
IMM GRANULOCYTES NFR BLD AUTO: 0.4 % (ref 0–0.5)
INR PPP: 1.04 (ref 0.9–1.1)
LYMPHOCYTES # BLD AUTO: 1.62 10*3/MM3 (ref 0.7–3.1)
LYMPHOCYTES NFR BLD AUTO: 14.5 % (ref 19.6–45.3)
M PNEUMO IGG SER IA-ACNC: NOT DETECTED
MAGNESIUM SERPL-MCNC: 2 MG/DL (ref 1.6–2.4)
MAXIMAL PREDICTED HEART RATE: 133 BPM
MCH RBC QN AUTO: 26.7 PG (ref 26.6–33)
MCHC RBC AUTO-ENTMCNC: 32.5 G/DL (ref 31.5–35.7)
MCV RBC AUTO: 82.3 FL (ref 79–97)
MONOCYTES # BLD AUTO: 1.47 10*3/MM3 (ref 0.1–0.9)
MONOCYTES NFR BLD AUTO: 13.2 % (ref 5–12)
NEUTROPHILS NFR BLD AUTO: 7.99 10*3/MM3 (ref 1.7–7)
NEUTROPHILS NFR BLD AUTO: 71.4 % (ref 42.7–76)
NRBC BLD AUTO-RTO: 0.1 /100 WBC (ref 0–0.2)
NT-PROBNP SERPL-MCNC: 4260 PG/ML (ref 0–1800)
PLATELET # BLD AUTO: 277 10*3/MM3 (ref 140–450)
PMV BLD AUTO: 10.5 FL (ref 6–12)
POTASSIUM SERPL-SCNC: 4.1 MMOL/L (ref 3.5–5.2)
PROCALCITONIN SERPL-MCNC: 0.09 NG/ML (ref 0–0.25)
PROT SERPL-MCNC: 7.5 G/DL (ref 6–8.5)
PROTHROMBIN TIME: 13.5 SECONDS (ref 11.7–14.2)
QT INTERVAL: 463 MS
RBC # BLD AUTO: 4.3 10*6/MM3 (ref 3.77–5.28)
RHINOVIRUS RNA SPEC NAA+PROBE: NOT DETECTED
RSV RNA NPH QL NAA+NON-PROBE: NOT DETECTED
SARS-COV-2 RNA NPH QL NAA+NON-PROBE: NOT DETECTED
SODIUM SERPL-SCNC: 137 MMOL/L (ref 136–145)
STRESS TARGET HR: 113 BPM
TROPONIN T SERPL-MCNC: 0.01 NG/ML (ref 0–0.03)
WBC NRBC COR # BLD: 11.17 10*3/MM3 (ref 3.4–10.8)
WHOLE BLOOD HOLD SPECIMEN: NORMAL
WHOLE BLOOD HOLD SPECIMEN: NORMAL

## 2022-03-31 PROCEDURE — 72110 X-RAY EXAM L-2 SPINE 4/>VWS: CPT

## 2022-03-31 PROCEDURE — 84145 PROCALCITONIN (PCT): CPT | Performed by: EMERGENCY MEDICINE

## 2022-03-31 PROCEDURE — 71275 CT ANGIOGRAPHY CHEST: CPT

## 2022-03-31 PROCEDURE — 71045 X-RAY EXAM CHEST 1 VIEW: CPT

## 2022-03-31 PROCEDURE — 80053 COMPREHEN METABOLIC PANEL: CPT | Performed by: EMERGENCY MEDICINE

## 2022-03-31 PROCEDURE — 84484 ASSAY OF TROPONIN QUANT: CPT | Performed by: EMERGENCY MEDICINE

## 2022-03-31 PROCEDURE — 85610 PROTHROMBIN TIME: CPT | Performed by: EMERGENCY MEDICINE

## 2022-03-31 PROCEDURE — 93970 EXTREMITY STUDY: CPT

## 2022-03-31 PROCEDURE — 83880 ASSAY OF NATRIURETIC PEPTIDE: CPT | Performed by: EMERGENCY MEDICINE

## 2022-03-31 PROCEDURE — 85730 THROMBOPLASTIN TIME PARTIAL: CPT | Performed by: EMERGENCY MEDICINE

## 2022-03-31 PROCEDURE — 93010 ELECTROCARDIOGRAM REPORT: CPT | Performed by: INTERNAL MEDICINE

## 2022-03-31 PROCEDURE — 85025 COMPLETE CBC W/AUTO DIFF WBC: CPT | Performed by: EMERGENCY MEDICINE

## 2022-03-31 PROCEDURE — 0202U NFCT DS 22 TRGT SARS-COV-2: CPT | Performed by: EMERGENCY MEDICINE

## 2022-03-31 PROCEDURE — 93005 ELECTROCARDIOGRAM TRACING: CPT | Performed by: EMERGENCY MEDICINE

## 2022-03-31 PROCEDURE — 83735 ASSAY OF MAGNESIUM: CPT | Performed by: EMERGENCY MEDICINE

## 2022-03-31 PROCEDURE — 99285 EMERGENCY DEPT VISIT HI MDM: CPT

## 2022-03-31 PROCEDURE — 0 IOPAMIDOL PER 1 ML: Performed by: EMERGENCY MEDICINE

## 2022-03-31 RX ORDER — SENNOSIDES 8.6 MG
650 CAPSULE ORAL EVERY 6 HOURS PRN
COMMUNITY

## 2022-03-31 RX ORDER — LEVOTHYROXINE SODIUM 88 UG/1
88 TABLET ORAL DAILY
COMMUNITY

## 2022-03-31 RX ADMIN — IOPAMIDOL 95 ML: 755 INJECTION, SOLUTION INTRAVENOUS at 20:05

## 2022-03-31 RX ADMIN — SODIUM CHLORIDE 500 ML: 9 INJECTION, SOLUTION INTRAVENOUS at 20:27

## 2022-04-01 LAB
BILIRUB UR QL STRIP: NEGATIVE
CLARITY UR: CLEAR
COLOR UR: YELLOW
GLUCOSE UR STRIP-MCNC: NEGATIVE MG/DL
HGB UR QL STRIP.AUTO: NEGATIVE
KETONES UR QL STRIP: NEGATIVE
LEUKOCYTE ESTERASE UR QL STRIP.AUTO: NEGATIVE
NITRITE UR QL STRIP: NEGATIVE
PH UR STRIP.AUTO: <=5 [PH] (ref 5–8)
PROT UR QL STRIP: NEGATIVE
SP GR UR STRIP: 1.02 (ref 1–1.03)
UROBILINOGEN UR QL STRIP: NORMAL

## 2022-04-01 PROCEDURE — 99221 1ST HOSP IP/OBS SF/LOW 40: CPT | Performed by: INTERNAL MEDICINE

## 2022-04-01 PROCEDURE — 81003 URINALYSIS AUTO W/O SCOPE: CPT | Performed by: HOSPITALIST

## 2022-04-01 RX ORDER — FUROSEMIDE 20 MG/1
20 TABLET ORAL
Status: DISCONTINUED | OUTPATIENT
Start: 2022-04-01 | End: 2022-04-06 | Stop reason: HOSPADM

## 2022-04-01 RX ORDER — LOSARTAN POTASSIUM 25 MG/1
25 TABLET ORAL
Status: DISCONTINUED | OUTPATIENT
Start: 2022-04-01 | End: 2022-04-01

## 2022-04-01 RX ORDER — PAROXETINE HYDROCHLORIDE 20 MG/1
20 TABLET, FILM COATED ORAL NIGHTLY
Status: DISCONTINUED | OUTPATIENT
Start: 2022-04-01 | End: 2022-04-06 | Stop reason: HOSPADM

## 2022-04-01 RX ORDER — IPRATROPIUM BROMIDE AND ALBUTEROL SULFATE 2.5; .5 MG/3ML; MG/3ML
3 SOLUTION RESPIRATORY (INHALATION)
Status: DISCONTINUED | OUTPATIENT
Start: 2022-04-01 | End: 2022-04-01

## 2022-04-01 RX ORDER — ATORVASTATIN CALCIUM 20 MG/1
20 TABLET, FILM COATED ORAL DAILY
Status: DISCONTINUED | OUTPATIENT
Start: 2022-04-01 | End: 2022-04-01

## 2022-04-01 RX ORDER — ASPIRIN 81 MG/1
81 TABLET, CHEWABLE ORAL DAILY
Status: DISCONTINUED | OUTPATIENT
Start: 2022-04-01 | End: 2022-04-06 | Stop reason: HOSPADM

## 2022-04-01 RX ORDER — IPRATROPIUM BROMIDE AND ALBUTEROL SULFATE 2.5; .5 MG/3ML; MG/3ML
3 SOLUTION RESPIRATORY (INHALATION) EVERY 4 HOURS PRN
Status: DISCONTINUED | OUTPATIENT
Start: 2022-04-01 | End: 2022-04-06

## 2022-04-01 RX ORDER — HYDROCODONE BITARTRATE AND ACETAMINOPHEN 5; 325 MG/1; MG/1
1 TABLET ORAL EVERY 6 HOURS PRN
Status: DISCONTINUED | OUTPATIENT
Start: 2022-04-01 | End: 2022-04-06 | Stop reason: HOSPADM

## 2022-04-01 RX ORDER — FUROSEMIDE 20 MG/1
20 TABLET ORAL DAILY
Status: DISCONTINUED | OUTPATIENT
Start: 2022-04-01 | End: 2022-04-01

## 2022-04-01 RX ORDER — LOSARTAN POTASSIUM 100 MG/1
100 TABLET ORAL
Status: DISCONTINUED | OUTPATIENT
Start: 2022-04-01 | End: 2022-04-05

## 2022-04-01 RX ORDER — HYDROXYCHLOROQUINE SULFATE 200 MG/1
200 TABLET, FILM COATED ORAL DAILY
Status: DISCONTINUED | OUTPATIENT
Start: 2022-04-01 | End: 2022-04-06 | Stop reason: HOSPADM

## 2022-04-01 RX ORDER — CLOPIDOGREL BISULFATE 75 MG/1
75 TABLET ORAL DAILY
Status: DISCONTINUED | OUTPATIENT
Start: 2022-04-01 | End: 2022-04-06 | Stop reason: HOSPADM

## 2022-04-01 RX ORDER — HYDROMORPHONE HYDROCHLORIDE 1 MG/ML
0.5 INJECTION, SOLUTION INTRAMUSCULAR; INTRAVENOUS; SUBCUTANEOUS EVERY 4 HOURS PRN
Status: DISCONTINUED | OUTPATIENT
Start: 2022-04-01 | End: 2022-04-02

## 2022-04-01 RX ORDER — PANTOPRAZOLE SODIUM 40 MG/1
40 TABLET, DELAYED RELEASE ORAL
Status: DISCONTINUED | OUTPATIENT
Start: 2022-04-01 | End: 2022-04-06 | Stop reason: HOSPADM

## 2022-04-01 RX ORDER — LEVOTHYROXINE SODIUM 88 UG/1
88 TABLET ORAL EVERY MORNING
Status: DISCONTINUED | OUTPATIENT
Start: 2022-04-01 | End: 2022-04-06 | Stop reason: HOSPADM

## 2022-04-01 RX ORDER — ATORVASTATIN CALCIUM 20 MG/1
10 TABLET, FILM COATED ORAL NIGHTLY
Status: DISCONTINUED | OUTPATIENT
Start: 2022-04-01 | End: 2022-04-06 | Stop reason: HOSPADM

## 2022-04-01 RX ORDER — GUAIFENESIN 600 MG/1
600 TABLET, EXTENDED RELEASE ORAL EVERY 12 HOURS SCHEDULED
Status: DISCONTINUED | OUTPATIENT
Start: 2022-04-01 | End: 2022-04-06 | Stop reason: HOSPADM

## 2022-04-01 RX ORDER — CARVEDILOL 3.12 MG/1
3.12 TABLET ORAL 2 TIMES DAILY WITH MEALS
Status: DISCONTINUED | OUTPATIENT
Start: 2022-04-01 | End: 2022-04-06 | Stop reason: HOSPADM

## 2022-04-01 RX ADMIN — HYDROCODONE BITARTRATE AND ACETAMINOPHEN 1 TABLET: 5; 325 TABLET ORAL at 14:14

## 2022-04-01 RX ADMIN — LOSARTAN POTASSIUM 100 MG: 100 TABLET, FILM COATED ORAL at 13:17

## 2022-04-01 RX ADMIN — PAROXETINE HYDROCHLORIDE HEMIHYDRATE 20 MG: 20 TABLET, FILM COATED ORAL at 21:01

## 2022-04-01 RX ADMIN — GUAIFENESIN 600 MG: 600 TABLET, EXTENDED RELEASE ORAL at 11:17

## 2022-04-01 RX ADMIN — ATORVASTATIN CALCIUM 20 MG: 20 TABLET, FILM COATED ORAL at 11:17

## 2022-04-01 RX ADMIN — FUROSEMIDE 20 MG: 20 TABLET ORAL at 13:17

## 2022-04-01 RX ADMIN — FUROSEMIDE 20 MG: 20 TABLET ORAL at 17:16

## 2022-04-01 RX ADMIN — CLOPIDOGREL 75 MG: 75 TABLET, FILM COATED ORAL at 11:16

## 2022-04-01 RX ADMIN — ATORVASTATIN CALCIUM 10 MG: 20 TABLET, FILM COATED ORAL at 21:01

## 2022-04-01 RX ADMIN — HYDROXYCHLOROQUINE SULFATE 200 MG: 200 TABLET ORAL at 13:17

## 2022-04-01 RX ADMIN — CARVEDILOL 3.12 MG: 3.12 TABLET, FILM COATED ORAL at 17:16

## 2022-04-01 RX ADMIN — LEVOTHYROXINE SODIUM 88 MCG: 0.09 TABLET ORAL at 13:17

## 2022-04-01 RX ADMIN — GUAIFENESIN 600 MG: 600 TABLET, EXTENDED RELEASE ORAL at 21:01

## 2022-04-01 RX ADMIN — ASPIRIN 81 MG: 81 TABLET, CHEWABLE ORAL at 11:17

## 2022-04-01 RX ADMIN — HYDROCODONE BITARTRATE AND ACETAMINOPHEN 1 TABLET: 5; 325 TABLET ORAL at 21:01

## 2022-04-01 RX ADMIN — PANTOPRAZOLE SODIUM 40 MG: 40 TABLET, DELAYED RELEASE ORAL at 11:16

## 2022-04-01 RX ADMIN — CARVEDILOL 3.12 MG: 3.12 TABLET, FILM COATED ORAL at 09:40

## 2022-04-01 NOTE — CONSULTS
[facility]  Cardiology Consult Note    Patient Identification:  Name: Verona Avila  Age: 87 y.o.  Sex: female  :  1934  MRN: [id number]             Requesting Physician: Dr Minor    Reason for Consultation / Chief Complaint: CHF    History of Present Illness:     This is an 87-year-old female who is known to our service.  She has a history to include hypertension, CKD, hypothyroidism, depression, osteoarthritis, RA.  She presented to [facility] ER via EMS from the [facility] [place] [facility] with complaints of low back pain, trouble walking and cough.  Upon EMS arrival to patient sats were in the upper 80s on room air, she was placed on nasal cannula by EMS with improvement.  She reports she fell 3 weeks ago.  ECG in ER sinus rhythm with no acute ST changes from previous.  Chest x-ray shows no acute cardiopulmonary process.  Troponin negative, BNP 4260.  Lower extremity Doppler negative for DVT. CT scan chest shows cardiac enlargement, no pulmonary embolism, aneurysm or dissection.  Scarring at both lung bases compatible with emphysema, nominal bronchiectasis.    Echo 2021 EF 53%, grade 1 LV diastolic dysfunction, RAC borderline dilated.  Holter monitor 2021 normal sinus rhythm with rare PACs and NS SVT.    Comorbid cardiac risk factors: Age, hypertension    Past Medical History:  Past Medical History:   Diagnosis Date    Arthritis     Atherosclerosis     Depression     Difficulty walking     Disease of thyroid gland     Hypertension     Muscle weakness     Spinal stenosis      Past Surgical History:  Past Surgical History:   Procedure Laterality Date    THYROID SURGERY      TONSILLECTOMY        Allergies:  No Known Allergies  Home Meds:  Medications Prior to Admission   Medication Sig Dispense Refill Last Dose    aspirin 81 MG chewable tablet Chew 1 tablet Daily. 30 tablet 0     atorvastatin (LIPITOR) 20 MG tablet Take 20 mg by mouth daily.       carvedilol (COREG)  3.125 MG tablet Take 1 tablet by mouth 2 (Two) Times a Day With Meals. 60 tablet 0     clopidogrel (PLAVIX) 75 MG tablet Take 1 tablet by mouth Daily. 30 tablet 0     HYDROcodone-acetaminophen (NORCO) 5-325 MG per tablet Take 1/2 to 1 tablet q6h as needed for pain. (Patient taking differently: 1 tablet Every 4 (Four) Hours As Needed. Take 1/2 to 1 tablet q6h as needed for pain.) 12 tablet 0     hydroxychloroquine (PLAQUENIL) 200 MG tablet Take  by mouth daily.       losartan (COZAAR) 25 MG tablet Take 1 tablet by mouth Daily. 30 tablet 0     pantoprazole (PROTONIX) 40 MG EC tablet Take 1 tablet by mouth Every Morning. 30 tablet 0     PARoxetine (PAXIL) 20 MG tablet Take 20 mg by mouth Every Night.       acetaminophen (TYLENOL) 650 MG 8 hr tablet Take 650 mg by mouth Every 6 (Six) Hours As Needed for Mild Pain .       levothyroxine (SYNTHROID, LEVOTHROID) 88 MCG tablet Take 88 mcg by mouth Daily.        Current Meds:   Current Facility-Administered Medications   Medication Dose Route Frequency Provider Last Rate Last Admin    aspirin chewable tablet 81 mg  81 mg Oral Daily Twin Minor MD   81 mg at 04/01/22 1117    atorvastatin (LIPITOR) tablet 10 mg  10 mg Oral Nightly Twin Minor MD        carvedilol (COREG) tablet 3.125 mg  3.125 mg Oral BID With Meals wTin Minor MD   3.125 mg at 04/01/22 0940    clopidogrel (PLAVIX) tablet 75 mg  75 mg Oral Daily Twin Minor MD   75 mg at 04/01/22 1116    furosemide (LASIX) tablet 20 mg  20 mg Oral BID Twin Minor MD        guaiFENesin (MUCINEX) 12 hr tablet 600 mg  600 mg Oral Q12H Twin Minor MD   600 mg at 04/01/22 1117    HYDROcodone-acetaminophen (NORCO) 5-325 MG per tablet 1 tablet  1 tablet Oral Q6H PRN Twin Minor MD        HYDROmorphone (DILAUDID) injection 0.5 mg  0.5 mg Intravenous Q4H PRN Twin Minor MD        hydroxychloroquine (PLAQUENIL) tablet 200 mg  200 mg Oral Daily Twin Minor MD   200 mg at 04/01/22 1317    ipratropium-albuterol (DUO-NEB)  "nebulizer solution 3 mL  3 mL Nebulization Q4H PRN Twin Minor MD        levothyroxine (SYNTHROID, LEVOTHROID) tablet 88 mcg  88 mcg Oral QAM Twin Minor MD   88 mcg at 04/01/22 1317    losartan (COZAAR) tablet 100 mg  100 mg Oral Q24H Twin Minor MD   100 mg at 04/01/22 1317    pantoprazole (PROTONIX) EC tablet 40 mg  40 mg Oral Q AM Twin Minor MD   40 mg at 04/01/22 1116    PARoxetine (PAXIL) tablet 20 mg  20 mg Oral Nightly Twin Minor MD           Social History:   Social History     Tobacco Use    Smoking status: Former Smoker    Smokeless tobacco: Never Used   Substance Use Topics    Alcohol use: No      Family History:  History reviewed. No pertinent family history.     Review of Systems  Constitutional: No wt loss, fever   Gastrointestinal: No nausea , abdominal pain  Behavioral/Psych: No insomnia or anxiety   Cardiovascular ----positive for sob. All other systems reviewed and are negative                   Physical Exam  /73 (BP Location: Left arm, Patient Position: Lying)   Pulse 67   Temp 98.2 °F (36.8 °C) (Oral)   Resp 17   Ht 154.9 cm (61\")   Wt 47.6 kg (104 lb 15 oz)   SpO2 92%   BMI 19.83 kg/m²     General appearance: No acute changes   Eyes: Sclerae conjunctivae normal, pupils reactive   HENT: Atraumatic; oropharynx clear with moist mucous membranes and no mucosal ulcerations;  Neck: Trachea midline; NECK, supple, no thyromegaly or lymphadenopathy   Lungs: Normal size and shape, normal breath sounds, equal distribution of air, no rales and rhonchi   CV: S1-S2 regular, no murmurs, no rub, no gallop   Abdomen: Soft, nontender; no masses , no abnormal abdominal sounds   Extremities: No deformity , normal color , no peripheral edema   Skin: Normal temperature, turgor and texture; no rash, ulcers  Psych: Appropriate affect, alert and oriented to person, place and time             Cardiographics  ECG:  Sinus rhythm, RBBB, no change from previous    Comparison " 11/20/2021      Telemetry:    Echocardiogram:   Interpretation Summary    Calculated left ventricular EF = 53.8% Estimated left ventricular EF was in agreement with the calculated left ventricular EF.  Left ventricular diastolic function is consistent with (grade I) impaired relaxation.  The right atrial cavity is borderline dilated.  There is calcification of the aortic valve.    Interpretation Summary    A normal monitor study.  NSR WITH RARE PACS, AND NSSVT     Interpretation Summary    Normal bilateral lower extremity venous duplex scan.      Imaging  Chest X-ray:   CONCLUSION:  1. Cardiac enlargement.  2. No pulmonary embolus, aortic aneurysm nor dissection.  3. Scarring demonstrated at both lung bases. There is bleb and bullae  formation compatible with emphysema, nominal bronchiectasis.        This report was finalized on 3/31/2022 8:20 PM by Dr. Johnathon Saldivar M.D.    CONCLUSION: No acute cardiovascular or pulmonary process is  demonstrated.     This report was finalized on 3/31/2022 5:49 PM by Dr. Johnathon Saldivar M.D.    Lab Review   Results from last 7 days   Lab Units 03/31/22  1710   TROPONIN T ng/mL 0.015     Results from last 7 days   Lab Units 03/31/22  1710   MAGNESIUM mg/dL 2.0     Results from last 7 days   Lab Units 03/31/22  1710   SODIUM mmol/L 137   POTASSIUM mmol/L 4.1   BUN mg/dL 25*   CREATININE mg/dL 0.96   CALCIUM mg/dL 9.1     @[id number]@  Results from last 7 days   Lab Units 03/31/22  1710   WBC 10*3/mm3 11.17*   HEMOGLOBIN g/dL 11.5*   HEMATOCRIT % 35.4   PLATELETS 10*3/mm3 277     Results from last 7 days   Lab Units 03/31/22  1710   INR  1.04   APTT seconds 29.9         Assessment:  Acute hypoxic respiratory failure  New onset CHF  Hypertension  Hyperlipidemia    Recommendations / Plan:     This is an 87-year-old female who is known to our service.  We have been consulted for congestive heart failure.  She presented [facility] ER via EMS from the [facility] [place]  [facility] for complaints of low back pain trouble walking and a cough.  She was hypoxic upon EMS arrival, sats increased into the 90s with supplemental oxygen via nasal cannula.  ECG in ER showed sinus rhythm with no acute ST changes.  Troponin negative, BNP 4260.  CT scan of chest shows cardiac enlargement, no pulmonary embolism, scarring in lung bases compatible with emphysema, nominal bronchiectasis. Echo in 2021 EF 53%, grade 1 diastolic dysfunction.  Continue aspirin, statin, beta-blockade, Plavix.    Repeat echo.  Will likely need stress test, possibly Monday.  Continue furosemide 20 mg twice daily.    Labs/tests ordered for am: bmp, trop, ecg      Jessi Pillai, ADAIR  4/1/2022, 13:40 EDT    Patient personally interviewed and above subjective findings personally confirmed during a face to face contact with patient today  All findings of physical examination confirmed  All pertinent and performed labs, cardiac procedures ,  radiographs of the last 24 hours personally reviewed  Impression and plans discussed/elaborated and implemented jointly as described above     Ofelia Fisher MD          EMR Dragon/Transcription:   Dictated utilizing Dragon dictation

## 2022-04-01 NOTE — H&P
"History and physical    Primary care physician  Dr. Kelsey Prabhakar    Chief complaint  Back pain    History of present illness  87-year-old white female with history of osteoarthritis rheumatoid arthritis degenerative disease hypertension hyperlipidemia and gastroesophageal reflux disease who is well-known to our service presented to [facility] emergency room with severe back pain as she fell 3 weeks ago.  Patient denies any radiation of the pain.  Patient work-up in ER revealed hypoxia but she denies any chest pain shortness of breath palpitation.  Patient also denies any fever cough congestion night sweats weight loss or weight gain.  Patient admitted for management.    PAST MEDICAL HISTORY  • Arthritis     • Atherosclerosis     • Depression     • Difficulty walking     • Disease of thyroid gland     • Hypertension     • Muscle weakness     • Spinal stenosis        PAST SURGICAL HISTORY              Procedure Laterality Date   • THYROID SURGERY       • TONSILLECTOMY             FAMILY HISTORY  History reviewed. No pertinent family history.     SOCIAL HISTORY                Socioeconomic History   • Marital status:    Tobacco Use   • Smoking status: Former Smoker   • Smokeless tobacco: Never Used   Substance and Sexual Activity   • Alcohol use: No   • Drug use: No         ALLERGIES  Patient has no known allergies.  Home medications reviewed     REVIEW OF SYSTEMS  All systems reviewed and negative except for those discussed in HPI.      PHYSICAL EXAM  Blood pressure 157/73, pulse 66, temperature 97.8 °F (36.6 °C), temperature source Oral, resp. rate 18, height 154.9 cm (61\"), weight 50.8 kg (111 lb 15.9 oz), SpO2 91 %.    General: No acute distress.  HENT: NCAT, PERRL, Nares patent.  Eyes: no scleral icterus.  Neck: trachea midline, no ROM limitations.  CV: regular rhythm, regular rate.  Respiratory: normal effort, inspiratory crackles at bilateral lung bases.  Abdomen: soft, nondistended nontender " bowel sounds positive  Musculoskeletal: no deformity.  Lumbar spine: No step-offs or deformities, no midline tenderness to palpation, bilateral paraspinal tenderness to palpation.  5 out of 5 strength with sensation intact to light touch bilateral lower extremities.  Neuro: alert, moves all extremities, follows commands.  Skin: warm, dry.  Discoloration of bilateral legs which patient states is chronic, no signs of infection, nonpitting edema bilateral lower extremities     LAB RESULTS  Lab Results (last 24 hours)     Procedure Component Value Units Date/Time    Respiratory Panel PCR w/COVID-19(SARS-CoV-2) MICHELLE/AMINAH/HINA/PAD/COR/MAD/KEREN In-House, NP Swab in UTM/VTM, 3-4 HR TAT - Swab, Nasopharynx [[id number]]  (Normal) Collected: 03/31/22 1710    Specimen: Swab from Nasopharynx Updated: 03/31/22 1829     ADENOVIRUS, PCR Not Detected     Coronavirus 229E Not Detected     Coronavirus HKU1 Not Detected     Coronavirus NL63 Not Detected     Coronavirus OC43 Not Detected     COVID19 Not Detected     Human Metapneumovirus Not Detected     Human Rhinovirus/Enterovirus Not Detected     Influenza A PCR Not Detected     Influenza B PCR Not Detected     Parainfluenza Virus 1 Not Detected     Parainfluenza Virus 2 Not Detected     Parainfluenza Virus 3 Not Detected     Parainfluenza Virus 4 Not Detected     RSV, PCR Not Detected     Bordetella pertussis pcr Not Detected     Bordetella parapertussis PCR Not Detected     Chlamydophila pneumoniae PCR Not Detected     Mycoplasma pneumo by PCR Not Detected    Narrative:      In the setting of a positive respiratory panel with a viral infection PLUS a negative procalcitonin without other underlying concern for bacterial infection, consider observing off antibiotics or discontinuation of antibiotics and continue supportive care. If the respiratory panel is positive for atypical bacterial infection (Bordetella pertussis, Chlamydophila pneumoniae, or Mycoplasma pneumoniae), consider  antibiotic de-escalation to target atypical bacterial infection.    [facility] Draw [[id number]] Collected: 03/31/22 1710    Specimen: Blood Updated: 03/31/22 1817    Narrative:      The following orders were created for panel order [facility] Draw.  Procedure                               Abnormality         Status                     ---------                               -----------         ------                     Green Top (Gel)[[id number]]                                  Final result               Lavender Top[[id number]]                                     Final result               Light Blue Top[[id number]]                                   Final result                 Please view results for these tests on the individual orders.    Green Top (Gel) [[id number]] Collected: 03/31/22 1710    Specimen: Blood Updated: 03/31/22 1817     Extra Tube Hold for add-ons.     Comment: Auto resulted.       Lavender Top [[id number]] Collected: 03/31/22 1710    Specimen: Blood Updated: 03/31/22 1817     Extra Tube hold for add-on     Comment: Auto resulted       Light Blue Top [[id number]] Collected: 03/31/22 1710    Specimen: Blood Updated: 03/31/22 1817     Extra Tube hold for add-on     Comment: Auto resulted       Comprehensive Metabolic Panel [[id number]]  (Abnormal) Collected: 03/31/22 1710    Specimen: Blood Updated: 03/31/22 1752     Glucose 118 mg/dL      BUN 25 mg/dL      Creatinine 0.96 mg/dL      Sodium 137 mmol/L      Potassium 4.1 mmol/L      Comment: Slight hemolysis detected by analyzer. Results may be affected.        Chloride 104 mmol/L      CO2 21.0 mmol/L      Calcium 9.1 mg/dL      Total Protein 7.5 g/dL      Albumin 3.90 g/dL      ALT (SGPT) 23 U/L      AST (SGOT) 34 U/L      Alkaline Phosphatase 154 U/L      Total Bilirubin 0.4 mg/dL      Globulin 3.6 gm/dL      A/G Ratio 1.1 g/dL      BUN/Creatinine Ratio 26.0     Anion Gap 12.0 mmol/L      eGFR 57.4 mL/min/1.73      Comment: National Kidney Foundation and  "American Society of Nephrology (ASN) Task Force recommended calculation based on the Chronic Kidney Disease Epidemiology Collaboration (CKD-EPI) equation refit without adjustment for race.       Narrative:      GFR Normal >60  Chronic Kidney Disease <60  Kidney Failure <15      Magnesium [[id number]]  (Normal) Collected: 03/31/22 1710    Specimen: Blood Updated: 03/31/22 1752     Magnesium 2.0 mg/dL     Procalcitonin [[id number]]  (Normal) Collected: 03/31/22 1710    Specimen: Blood Updated: 03/31/22 1747     Procalcitonin 0.09 ng/mL     Narrative:      As a Marker for Sepsis (Non-Neonates):    1. <0.5 ng/mL represents a low risk of severe sepsis and/or septic shock.  2. >2 ng/mL represents a high risk of severe sepsis and/or septic shock.    As a Marker for Lower Respiratory Tract Infections that require antibiotic therapy:    PCT on Admission    Antibiotic Therapy       6-12 Hrs later    >0.5                Strongly Recommended  >0.25 - <0.5        Recommended   0.1 - 0.25          Discouraged              Remeasure/reassess PCT  <0.1                Strongly Discouraged     Remeasure/reassess PCT    As 28 day mortality risk marker: \"Change in Procalcitonin Result\" (>80% or <=80%) if Day 0 (or Day 1) and Day 4 values are available. Refer to http://www.[facility]-pct-calculator.com    Change in PCT <=80%  A decrease of PCT levels below or equal to 80% defines a positive change in PCT test result representing a higher risk for 28-day all-cause mortality of patients diagnosed with severe sepsis for septic shock.    Change in PCT >80%  A decrease of PCT levels of more than 80% defines a negative change in PCT result representing a lower risk for 28-day all-cause mortality of patients diagnosed with severe sepsis or septic shock.       Troponin [[id number]]  (Normal) Collected: 03/31/22 1710    Specimen: Blood Updated: 03/31/22 1744     Troponin T 0.015 ng/mL     Narrative:      Troponin T Reference Range:  <= 0.03 ng/mL-   " Negative for AMI  >0.03 ng/mL-     Abnormal for myocardial necrosis.  Clinicians would have to utilize clinical acumen, EKG, Troponin and serial changes to determine if it is an Acute Myocardial Infarction or myocardial injury due to an underlying chronic condition.       Results may be falsely decreased if patient taking Biotin.      BNP [[id number]]  (Abnormal) Collected: 03/31/22 1710    Specimen: Blood Updated: 03/31/22 1740     proBNP 4,260.0 pg/mL     Narrative:      Among patients with dyspnea, NT-proBNP is highly sensitive for the detection of acute congestive heart failure. In addition NT-proBNP of <300 pg/ml effectively rules out acute congestive heart failure with 99% negative predictive value.    Results may be falsely decreased if patient taking Biotin.      Protime-INR [[id number]]  (Normal) Collected: 03/31/22 1710    Specimen: Blood Updated: 03/31/22 1736     Protime 13.5 Seconds      INR 1.04    aPTT [[id number]]  (Normal) Collected: 03/31/22 1710    Specimen: Blood Updated: 03/31/22 1736     PTT 29.9 seconds     CBC & Differential [[id number]]  (Abnormal) Collected: 03/31/22 1710    Specimen: Blood Updated: 03/31/22 1718    Narrative:      The following orders were created for panel order CBC & Differential.  Procedure                               Abnormality         Status                     ---------                               -----------         ------                     CBC Auto Differential[[id number]]        Abnormal            Final result                 Please view results for these tests on the individual orders.    CBC Auto Differential [[id number]]  (Abnormal) Collected: 03/31/22 1710    Specimen: Blood Updated: 03/31/22 1718     WBC 11.17 10*3/mm3      RBC 4.30 10*6/mm3      Hemoglobin 11.5 g/dL      Hematocrit 35.4 %      MCV 82.3 fL      MCH 26.7 pg      MCHC 32.5 g/dL      RDW 13.4 %      RDW-SD 39.5 fl      MPV 10.5 fL      Platelets 277 10*3/mm3      Neutrophil % 71.4 %       Lymphocyte % 14.5 %      Monocyte % 13.2 %      Eosinophil % 0.2 %      Basophil % 0.3 %      Immature Grans % 0.4 %      Neutrophils, Absolute 7.99 10*3/mm3      Lymphocytes, Absolute 1.62 10*3/mm3      Monocytes, Absolute 1.47 10*3/mm3      Eosinophils, Absolute 0.02 10*3/mm3      Basophils, Absolute 0.03 10*3/mm3      Immature Grans, Absolute 0.04 10*3/mm3      nRBC 0.1 /100 WBC         Imaging Results (Last 24 Hours)     Procedure Component Value Units Date/Time    CT Angiogram Chest [[id number]] Collected: 03/31/22 2011     Updated: 03/31/22 2023    Narrative:      CT ANGIOGRAM CHEST-     Radiation dose reduction techniques were utilized, including automated  exposure control and exposure modulation based on body size.     Clinical: Hypoxia, rule out pulmonary embolism     COMPARISON: None     CT scan of the chest performed with intravenous contrast, pulmonary  embolus protocol to include coronal, sagittal and three-dimensional  reconstruction.     FINDINGS: There is cardiac enlargement. No pericardial effusion. There  is coronary artery calcification. No pulmonary embolus. Atherosclerotic  calcification of the aorta diameter within normal limits. There is heavy  atherosclerotic plaque formation at the origin of the right subclavian  artery. Esophagus is satisfactory in course and caliber. No mediastinal  mass nor lymphadenopathy.     There are linear areas of scarring versus discoid atelectasis at both  lung bases. No pleural effusion. No pulmonary edema. No lung  consolidation or suspicious pulmonary parenchymal lesion. Bullae  formation compatible with emphysema. There is bleb formation as well as  bronchiectasis.     CONCLUSION:  1. Cardiac enlargement.  2. No pulmonary embolus, aortic aneurysm nor dissection.  3. Scarring demonstrated at both lung bases. There is bleb and bullae  formation compatible with emphysema, nominal bronchiectasis.        This report was finalized on 3/31/2022 8:20 PM by Dr. Diego  SHAKA Saldivar       XR Spine Lumbar Complete 4+VW [[id number]] Collected: 03/31/22 2003     Updated: 03/31/22 2011    Narrative:      LUMBAR SPINE SERIES     CLINICAL HISTORY: Trauma. Back pain.     6 views were obtained.     Compared to previous lumbar spine x-rays dated 02/21/2019.     There are extensive multilevel degenerative disc changes that result in  moderate dextroscoliosis centered at the elbow to Patricia 3 level that  appears slightly more prominent than on the previous lumbar spine  series. There is marked disc space narrowing at the L3-L4 level that  appears slightly more prominent. Marked disc space narrowing is also  noted at L2-L3 that is most prominent on the left where there is bony  spurring bridging the narrowed disc space. The remainder the lumbar disc  spaces are only mildly narrowed. All of the vertebral bodies appear  within normal limits in height. There is no evidence recent or old  fracture. Vascular calcification is noted.     IMPRESSIONS: Moderately extensive multilevel degenerative disc changes  showing slight progression since 02/21/2019. No acute bony abnormality  is identified.     This report was finalized on 3/31/2022 8:07 PM by Dr. Erik Ceballos M.D.       XR Chest 1 View [[id number]] Collected: 03/31/22 1746     Updated: 03/31/22 1752    Narrative:      XR CHEST 1 VW-     Clinical: Shortness of breath     COMPARISON 11/18/2021     FINDINGS: Heart size upper limits of normal to mildly enlarged, there is  atherosclerotic calcification of the aorta as before. There is an area  of nodularity in the right paratracheal space which probably secondary  to normal vascular structures, the patient is rotated towards right.     No pulmonary edema or pleural effusion seen. No acute airspace disease  identified.     CONCLUSION: No acute cardiovascular or pulmonary process is  demonstrated.     This report was finalized on 3/31/2022 5:49 PM by Dr. Johnathon Saldivar M.D.                ECG 12  Lead  Component   Ref Range & Units 3/31/22 1736 11/20/21 0628 11/18/21 1414   QT Interval   ms 463  403  415              HEART RATE= 71  bpm  RR Interval= 845  ms  RI Interval= 145  ms  P Horizontal Axis= -26  deg  P Front Axis= 68  deg  QRSD Interval= 134  ms  QT Interval= 463  ms  QRS Axis= -7  deg  T Wave Axis= 59  deg  - ABNORMAL ECG -  Sinus rhythm  Left atrial enlargement  Right bundle branch block  No change from previous tracing             Current Facility-Administered Medications:   •  aspirin chewable tablet 81 mg, 81 mg, Oral, Daily, Twin Minor MD, 81 mg at 04/01/22 1117  •  atorvastatin (LIPITOR) tablet 20 mg, 20 mg, Oral, Daily, Twin Minor MD, 20 mg at 04/01/22 1117  •  carvedilol (COREG) tablet 3.125 mg, 3.125 mg, Oral, BID With Meals, Twin Minor MD, 3.125 mg at 04/01/22 0940  •  clopidogrel (PLAVIX) tablet 75 mg, 75 mg, Oral, Daily, Twin Minor MD, 75 mg at 04/01/22 1116  •  furosemide (LASIX) tablet 20 mg, 20 mg, Oral, Daily, Twin Minor MD, 20 mg at 04/01/22 1317  •  guaiFENesin (MUCINEX) 12 hr tablet 600 mg, 600 mg, Oral, Q12H, Twin Minor MD, 600 mg at 04/01/22 1117  •  HYDROmorphone (DILAUDID) injection 0.5 mg, 0.5 mg, Intravenous, Q4H PRN, Twin Minor MD  •  hydroxychloroquine (PLAQUENIL) tablet 200 mg, 200 mg, Oral, Daily, Twin Minor MD, 200 mg at 04/01/22 1317  •  ipratropium-albuterol (DUO-NEB) nebulizer solution 3 mL, 3 mL, Nebulization, Q4H PRN, Twin Minor MD  •  levothyroxine (SYNTHROID, LEVOTHROID) tablet 88 mcg, 88 mcg, Oral, QAM, Twin Minor MD, 88 mcg at 04/01/22 1317  •  losartan (COZAAR) tablet 100 mg, 100 mg, Oral, Q24H, Twin Minor MD, 100 mg at 04/01/22 1317  •  pantoprazole (PROTONIX) EC tablet 40 mg, 40 mg, Oral, Q AM, Twin Minor MD, 40 mg at 04/01/22 1116  •  PARoxetine (PAXIL) tablet 20 mg, 20 mg, Oral, Nightly, Twin Minor MD     ASSESSMENT  Acute hypoxic respiratory failure  New onset congestive heart  failure  Hypertension  Hyperlipidemia  Hypothyroidism  Osteoarthritis  Rheumatoid arthritis  Degenerative disc disease  Depression  Gastroesophageal reflux disease    PLAN  Admit  Supplement oxygen  Nebulizer  IV diuresis  Serial cardiac enzymes and EKG  Check 2D echo  Pain management  Cardiology consult  Pulmonary to follow patient  Adjust home medications  Stress ulcer DVT prophylaxis  Supportive care  DNR  PT OT  Discussed with family and nursing staff  Follow closely further recommendation current hospital course    JONNY BOOKER MD

## 2022-04-01 NOTE — PLAN OF CARE
Problem: Adult Inpatient Plan of Care  Goal: Plan of Care Review  Outcome: Ongoing, Not Progressing  Flowsheets (Taken 4/1/2022 0509)  Outcome Evaluation: Pt arrived from the ER. Pt has severe back pain with activity. AO x 4. NSR. On 3L NC. WCM  Goal: Patient-Specific Goal (Individualized)  Outcome: Ongoing, Not Progressing  Goal: Absence of Hospital-Acquired Illness or Injury  Outcome: Ongoing, Not Progressing  Intervention: Identify and Manage Fall Risk  Recent Flowsheet Documentation  Taken 4/1/2022 0431 by Kathie Redmond, RN  Safety Promotion/Fall Prevention:   activity supervised   assistive device/personal items within reach   clutter free environment maintained   fall prevention program maintained   lighting adjusted   mobility aid in reach   nonskid shoes/slippers when out of bed   safety round/check completed   toileting scheduled  Taken 4/1/2022 0118 by Kathie Redmond, RN  Safety Promotion/Fall Prevention:   activity supervised   assistive device/personal items within reach   clutter free environment maintained   fall prevention program maintained   lighting adjusted   mobility aid in reach   nonskid shoes/slippers when out of bed   safety round/check completed   toileting scheduled  Intervention: Prevent and Manage VTE (Venous Thromboembolism) Risk  Recent Flowsheet Documentation  Taken 4/1/2022 0431 by Kathie Redmond, RN  Activity Management:   activity adjusted per tolerance   activity encouraged  Taken 4/1/2022 0118 by Kathie Redmond, RN  Activity Management:   activity adjusted per tolerance   activity encouraged  VTE Prevention/Management: dorsiflexion/plantar flexion performed  Goal: Optimal Comfort and Wellbeing  Outcome: Ongoing, Not Progressing  Intervention: Provide Person-Centered Care  Recent Flowsheet Documentation  Taken 4/1/2022 0118 by Kathie Redmond, RN  Trust Relationship/Rapport:   care explained   choices provided   emotional support provided   questions  answered   questions encouraged   reassurance provided   thoughts/feelings acknowledged  Goal: Readiness for Transition of Care  Outcome: Ongoing, Not Progressing  Intervention: Mutually Develop Transition Plan  Recent Flowsheet Documentation  Taken 4/1/2022 0108 by Kathie Redmond, RN  Transportation Anticipated: family or friend will provide  Patient/Family Anticipated Services at Transition: none  Patient/Family Anticipates Transition to: long-term care facility  Taken 4/1/2022 0105 by Kathie Redmond, RN  Equipment Currently Used at Home: walker, rolling   Goal Outcome Evaluation:              Outcome Evaluation: Pt arrived from the ER. Pt has severe back pain with activity. AO x 4. NSR. On 3L NC. WCM

## 2022-04-01 NOTE — PLAN OF CARE
Problem: Adult Inpatient Plan of Care  Goal: Plan of Care Review  Outcome: Ongoing, Progressing  Flowsheets (Taken 4/1/2022 1754)  Progress: no change  Plan of Care Reviewed With: patient  Goal: Patient-Specific Goal (Individualized)  Outcome: Ongoing, Progressing  Goal: Absence of Hospital-Acquired Illness or Injury  Outcome: Ongoing, Progressing  Intervention: Identify and Manage Fall Risk  Recent Flowsheet Documentation  Taken 4/1/2022 1600 by Roxanne Snow RN  Safety Promotion/Fall Prevention:   activity supervised   assistive device/personal items within reach   clutter free environment maintained   fall prevention program maintained   nonskid shoes/slippers when out of bed   safety round/check completed  Taken 4/1/2022 1400 by Roxanne Snow RN  Safety Promotion/Fall Prevention:   activity supervised   assistive device/personal items within reach   clutter free environment maintained   fall prevention program maintained   nonskid shoes/slippers when out of bed   toileting scheduled  Taken 4/1/2022 0800 by Roxanne Snow RN  Safety Promotion/Fall Prevention:   activity supervised   assistive device/personal items within reach   clutter free environment maintained   fall prevention program maintained   nonskid shoes/slippers when out of bed   safety round/check completed  Intervention: Prevent and Manage VTE (Venous Thromboembolism) Risk  Recent Flowsheet Documentation  Taken 4/1/2022 1600 by Roxanne Snow RN  Activity Management: activity adjusted per tolerance  Taken 4/1/2022 1400 by Roxanne Snow RN  Activity Management: activity adjusted per tolerance  VTE Prevention/Management:   bilateral   dorsiflexion/plantar flexion performed  Taken 4/1/2022 0800 by Roxanne Snow RN  Activity Management: activity adjusted per tolerance  VTE Prevention/Management:   bilateral   dorsiflexion/plantar flexion performed  Intervention: Prevent Infection  Recent Flowsheet Documentation  Taken 4/1/2022 1600 by  Roxanne Snow RN  Infection Prevention: single patient room provided  Taken 4/1/2022 1400 by Roxanne Snow RN  Infection Prevention: single patient room provided  Taken 4/1/2022 0800 by Roxanne Snow RN  Infection Prevention: single patient room provided  Goal: Optimal Comfort and Wellbeing  Outcome: Ongoing, Progressing  Intervention: Provide Person-Centered Care  Recent Flowsheet Documentation  Taken 4/1/2022 1400 by Roxanne Snow RN  Trust Relationship/Rapport:   care explained   choices provided  Taken 4/1/2022 0800 by Roxanne Snow RN  Trust Relationship/Rapport:   care explained   choices provided  Goal: Readiness for Transition of Care  Outcome: Ongoing, Progressing     Problem: Fall Injury Risk  Goal: Absence of Fall and Fall-Related Injury  Outcome: Ongoing, Progressing  Intervention: Identify and Manage Contributors  Recent Flowsheet Documentation  Taken 4/1/2022 1600 by Roxanne Snow RN  Medication Review/Management: medications reviewed  Taken 4/1/2022 1400 by Roxanne Snow RN  Medication Review/Management: medications reviewed  Taken 4/1/2022 0800 by Roxanne Snow RN  Medication Review/Management: medications reviewed  Intervention: Promote Injury-Free Environment  Recent Flowsheet Documentation  Taken 4/1/2022 1600 by Roxanne Snow RN  Safety Promotion/Fall Prevention:   activity supervised   assistive device/personal items within reach   clutter free environment maintained   fall prevention program maintained   nonskid shoes/slippers when out of bed   safety round/check completed  Taken 4/1/2022 1400 by Roxanne Snow RN  Safety Promotion/Fall Prevention:   activity supervised   assistive device/personal items within reach   clutter free environment maintained   fall prevention program maintained   nonskid shoes/slippers when out of bed   toileting scheduled  Taken 4/1/2022 0800 by Roxanne Snow RN  Safety Promotion/Fall Prevention:   activity supervised   assistive  device/personal items within reach   clutter free environment maintained   fall prevention program maintained   nonskid shoes/slippers when out of bed   safety round/check completed     Problem: Skin Injury Risk Increased  Goal: Skin Health and Integrity  Outcome: Ongoing, Progressing   Goal Outcome Evaluation:  Plan of Care Reviewed With: patient        Progress: no change

## 2022-04-01 NOTE — CASE MANAGEMENT/SOCIAL WORK
Discharge Planning Assessment  [facility]     Patient Name: Verona Avila  MRN: [id number]  Today's Date: 4/1/2022    Admit Date: 3/31/2022     Discharge Needs Assessment    No documentation.                Discharge Plan     Row Name 04/01/22 1445       Plan    Plan Return to [facility], if need ASTRID would like [facility] since she has been there in the past.    Patient/Family in Agreement with Plan yes    Plan Comments Spoke with patient and nephew Mj (posary) at bedside. Confirmed face sheet information and pharmacy is through [facility]. She lives in [facility]. She takes meals in the dining room, they help with all household chores and help bathing, she is otherwise independent for toileting, dressing etc. She uses rollator but no other medical equipment. Staff measure her BP and oxygen saturation daily and administer medications. She hopes to return there at d/c but she has been to the skilled nursing side and if needed would return there. She has never had HH or oxygen at home.              Continued Care and Services - Admitted Since 3/31/2022    Coordination has not been started for this encounter.          Demographic Summary    No documentation.                Functional Status     Row Name 04/01/22 1444       Functional Status    Usual Activity Tolerance good    Current Activity Tolerance good       Functional Status, IADL    Medications assistive person    Meal Preparation assistive person    Housekeeping assistive person    Laundry assistive person    Shopping assistive person    IADL Comments [facility] manages meds through their pharmacy. They also measure BP and 02 daily.       Mental Status    General Appearance WDL WDL       Mental Status Summary    Recent Changes in Mental Status/Cognitive Functioning no changes               Psychosocial    No documentation.                Abuse/Neglect    No documentation.                Legal     Row Name 04/01/22 1446        Financial/Legal    Who Manages Finances if Patient Unable Nephew Mj SWAN               Substance Abuse    No documentation.                Patient Forms    No documentation.                   Orquidea Aguayo RN

## 2022-04-02 ENCOUNTER — APPOINTMENT (OUTPATIENT)
Dept: CARDIOLOGY | Facility: HOSPITAL | Age: 87
End: 2022-04-02

## 2022-04-02 LAB
ALBUMIN SERPL-MCNC: 3.1 G/DL (ref 3.5–5.2)
ALBUMIN/GLOB SERPL: 1 G/DL
ALP SERPL-CCNC: 116 U/L (ref 39–117)
ALT SERPL W P-5'-P-CCNC: 17 U/L (ref 1–33)
ANION GAP SERPL CALCULATED.3IONS-SCNC: 9.6 MMOL/L (ref 5–15)
AST SERPL-CCNC: 25 U/L (ref 1–32)
BASOPHILS # BLD AUTO: 0.03 10*3/MM3 (ref 0–0.2)
BASOPHILS NFR BLD AUTO: 0.4 % (ref 0–1.5)
BILIRUB SERPL-MCNC: 0.2 MG/DL (ref 0–1.2)
BUN SERPL-MCNC: 20 MG/DL (ref 8–23)
BUN/CREAT SERPL: 21.3 (ref 7–25)
CALCIUM SPEC-SCNC: 8.3 MG/DL (ref 8.6–10.5)
CHLORIDE SERPL-SCNC: 105 MMOL/L (ref 98–107)
CHOLEST SERPL-MCNC: 106 MG/DL (ref 0–200)
CO2 SERPL-SCNC: 24.4 MMOL/L (ref 22–29)
CREAT SERPL-MCNC: 0.94 MG/DL (ref 0.57–1)
DEPRECATED RDW RBC AUTO: 42.4 FL (ref 37–54)
EGFRCR SERPLBLD CKD-EPI 2021: 58.8 ML/MIN/1.73
EOSINOPHIL # BLD AUTO: 0.51 10*3/MM3 (ref 0–0.4)
EOSINOPHIL NFR BLD AUTO: 6.2 % (ref 0.3–6.2)
ERYTHROCYTE [DISTWIDTH] IN BLOOD BY AUTOMATED COUNT: 13.4 % (ref 12.3–15.4)
GLOBULIN UR ELPH-MCNC: 3.2 GM/DL
GLUCOSE SERPL-MCNC: 91 MG/DL (ref 65–99)
HBA1C MFR BLD: 5.7 % (ref 4.8–5.6)
HCT VFR BLD AUTO: 32.5 % (ref 34–46.6)
HDLC SERPL-MCNC: 38 MG/DL (ref 40–60)
HGB BLD-MCNC: 10.1 G/DL (ref 12–15.9)
IMM GRANULOCYTES # BLD AUTO: 0.04 10*3/MM3 (ref 0–0.05)
IMM GRANULOCYTES NFR BLD AUTO: 0.5 % (ref 0–0.5)
LDLC SERPL CALC-MCNC: 53 MG/DL (ref 0–100)
LDLC/HDLC SERPL: 1.43 {RATIO}
LYMPHOCYTES # BLD AUTO: 1.5 10*3/MM3 (ref 0.7–3.1)
LYMPHOCYTES NFR BLD AUTO: 18.2 % (ref 19.6–45.3)
MCH RBC QN AUTO: 26.6 PG (ref 26.6–33)
MCHC RBC AUTO-ENTMCNC: 31.1 G/DL (ref 31.5–35.7)
MCV RBC AUTO: 85.8 FL (ref 79–97)
MONOCYTES # BLD AUTO: 1.13 10*3/MM3 (ref 0.1–0.9)
MONOCYTES NFR BLD AUTO: 13.7 % (ref 5–12)
NEUTROPHILS NFR BLD AUTO: 5.03 10*3/MM3 (ref 1.7–7)
NEUTROPHILS NFR BLD AUTO: 61 % (ref 42.7–76)
NRBC BLD AUTO-RTO: 0 /100 WBC (ref 0–0.2)
PLATELET # BLD AUTO: 255 10*3/MM3 (ref 140–450)
PMV BLD AUTO: 10.6 FL (ref 6–12)
POTASSIUM SERPL-SCNC: 3.7 MMOL/L (ref 3.5–5.2)
PROT SERPL-MCNC: 6.3 G/DL (ref 6–8.5)
RBC # BLD AUTO: 3.79 10*6/MM3 (ref 3.77–5.28)
SODIUM SERPL-SCNC: 139 MMOL/L (ref 136–145)
T4 FREE SERPL-MCNC: 1.56 NG/DL (ref 0.93–1.7)
TRIGL SERPL-MCNC: 68 MG/DL (ref 0–150)
TROPONIN T SERPL-MCNC: <0.01 NG/ML (ref 0–0.03)
TSH SERPL DL<=0.05 MIU/L-ACNC: 0.48 UIU/ML (ref 0.27–4.2)
VLDLC SERPL-MCNC: 15 MG/DL (ref 5–40)
WBC NRBC COR # BLD: 8.24 10*3/MM3 (ref 3.4–10.8)

## 2022-04-02 PROCEDURE — 97162 PT EVAL MOD COMPLEX 30 MIN: CPT

## 2022-04-02 PROCEDURE — 84484 ASSAY OF TROPONIN QUANT: CPT | Performed by: HOSPITALIST

## 2022-04-02 PROCEDURE — 97530 THERAPEUTIC ACTIVITIES: CPT

## 2022-04-02 PROCEDURE — 99232 SBSQ HOSP IP/OBS MODERATE 35: CPT | Performed by: NURSE PRACTITIONER

## 2022-04-02 PROCEDURE — 93306 TTE W/DOPPLER COMPLETE: CPT | Performed by: INTERNAL MEDICINE

## 2022-04-02 PROCEDURE — 80061 LIPID PANEL: CPT | Performed by: HOSPITALIST

## 2022-04-02 PROCEDURE — 84443 ASSAY THYROID STIM HORMONE: CPT | Performed by: HOSPITALIST

## 2022-04-02 PROCEDURE — 85025 COMPLETE CBC W/AUTO DIFF WBC: CPT | Performed by: HOSPITALIST

## 2022-04-02 PROCEDURE — 80053 COMPREHEN METABOLIC PANEL: CPT | Performed by: HOSPITALIST

## 2022-04-02 PROCEDURE — 84439 ASSAY OF FREE THYROXINE: CPT | Performed by: HOSPITALIST

## 2022-04-02 PROCEDURE — 93306 TTE W/DOPPLER COMPLETE: CPT

## 2022-04-02 PROCEDURE — 83036 HEMOGLOBIN GLYCOSYLATED A1C: CPT | Performed by: HOSPITALIST

## 2022-04-02 RX ADMIN — HYDROCODONE BITARTRATE AND ACETAMINOPHEN 1 TABLET: 5; 325 TABLET ORAL at 20:43

## 2022-04-02 RX ADMIN — ATORVASTATIN CALCIUM 10 MG: 20 TABLET, FILM COATED ORAL at 20:40

## 2022-04-02 RX ADMIN — CARVEDILOL 3.12 MG: 3.12 TABLET, FILM COATED ORAL at 18:05

## 2022-04-02 RX ADMIN — FUROSEMIDE 20 MG: 20 TABLET ORAL at 08:18

## 2022-04-02 RX ADMIN — GUAIFENESIN 600 MG: 600 TABLET, EXTENDED RELEASE ORAL at 08:18

## 2022-04-02 RX ADMIN — LOSARTAN POTASSIUM 100 MG: 100 TABLET, FILM COATED ORAL at 08:17

## 2022-04-02 RX ADMIN — PAROXETINE HYDROCHLORIDE HEMIHYDRATE 20 MG: 20 TABLET, FILM COATED ORAL at 20:39

## 2022-04-02 RX ADMIN — FUROSEMIDE 20 MG: 20 TABLET ORAL at 18:05

## 2022-04-02 RX ADMIN — CARVEDILOL 3.12 MG: 3.12 TABLET, FILM COATED ORAL at 08:17

## 2022-04-02 RX ADMIN — HYDROXYCHLOROQUINE SULFATE 200 MG: 200 TABLET ORAL at 08:17

## 2022-04-02 RX ADMIN — CLOPIDOGREL 75 MG: 75 TABLET, FILM COATED ORAL at 08:17

## 2022-04-02 RX ADMIN — ASPIRIN 81 MG: 81 TABLET, CHEWABLE ORAL at 08:18

## 2022-04-02 RX ADMIN — LEVOTHYROXINE SODIUM 88 MCG: 0.09 TABLET ORAL at 06:21

## 2022-04-02 RX ADMIN — PANTOPRAZOLE SODIUM 40 MG: 40 TABLET, DELAYED RELEASE ORAL at 06:21

## 2022-04-02 RX ADMIN — GUAIFENESIN 600 MG: 600 TABLET, EXTENDED RELEASE ORAL at 20:39

## 2022-04-02 NOTE — PLAN OF CARE
Problem: Adult Inpatient Plan of Care  Goal: Plan of Care Review  Outcome: Ongoing, Progressing  Flowsheets (Taken 4/2/2022 0427)  Outcome Evaluation: VSS. No c/o of pain. NSR. On 2-3 L NC. Ao x 4. WCM  Goal: Patient-Specific Goal (Individualized)  Outcome: Ongoing, Progressing  Goal: Absence of Hospital-Acquired Illness or Injury  Outcome: Ongoing, Progressing  Intervention: Identify and Manage Fall Risk  Recent Flowsheet Documentation  Taken 4/2/2022 0422 by Kathie Redmond, RN  Safety Promotion/Fall Prevention:   activity supervised   assistive device/personal items within reach   clutter free environment maintained   fall prevention program maintained   lighting adjusted   mobility aid in reach   nonskid shoes/slippers when out of bed   safety round/check completed   toileting scheduled  Taken 4/2/2022 0217 by Kathie Redmond, RN  Safety Promotion/Fall Prevention:   activity supervised   assistive device/personal items within reach   clutter free environment maintained   fall prevention program maintained   lighting adjusted   mobility aid in reach   nonskid shoes/slippers when out of bed   safety round/check completed   toileting scheduled  Taken 4/2/2022 0004 by Kathie Redmond, RN  Safety Promotion/Fall Prevention:   activity supervised   assistive device/personal items within reach   clutter free environment maintained   fall prevention program maintained   lighting adjusted   mobility aid in reach   nonskid shoes/slippers when out of bed   safety round/check completed   toileting scheduled  Taken 4/1/2022 2200 by Kathie Redmond, RN  Safety Promotion/Fall Prevention:   activity supervised   assistive device/personal items within reach   clutter free environment maintained   fall prevention program maintained   lighting adjusted   mobility aid in reach   nonskid shoes/slippers when out of bed   safety round/check completed   toileting scheduled  Taken 4/1/2022 2015 by Kathie Redmond  Chidi, ASHLI  Safety Promotion/Fall Prevention:   activity supervised   assistive device/personal items within reach   clutter free environment maintained   fall prevention program maintained   lighting adjusted   mobility aid in reach   nonskid shoes/slippers when out of bed   safety round/check completed   toileting scheduled  Intervention: Prevent and Manage VTE (Venous Thromboembolism) Risk  Recent Flowsheet Documentation  Taken 4/2/2022 0422 by Kathie Redmond, RN  Activity Management:   activity adjusted per tolerance   activity encouraged  Taken 4/2/2022 0217 by Kathie Redmond, RN  Activity Management:   activity adjusted per tolerance   activity encouraged  Taken 4/2/2022 0004 by Kathie Redmond, RN  Activity Management:   activity adjusted per tolerance   activity encouraged  Taken 4/1/2022 2200 by Kathie Redmond, RN  Activity Management:   activity adjusted per tolerance   activity encouraged  Taken 4/1/2022 2015 by Kathie Redmond, RN  Activity Management:   activity adjusted per tolerance   activity encouraged  VTE Prevention/Management:   bilateral   dorsiflexion/plantar flexion performed  Goal: Optimal Comfort and Wellbeing  Outcome: Ongoing, Progressing  Intervention: Provide Person-Centered Care  Recent Flowsheet Documentation  Taken 4/1/2022 2015 by Kathie Redmond, RN  Trust Relationship/Rapport:   care explained   choices provided   emotional support provided   questions answered   questions encouraged   reassurance provided   thoughts/feelings acknowledged  Goal: Readiness for Transition of Care  Outcome: Ongoing, Progressing   Goal Outcome Evaluation:              Outcome Evaluation: VSS. No c/o of pain. NSR. On 2-3 L NC. Ao x 4. WCM

## 2022-04-02 NOTE — CONSULTS
CONSULT NOTE    Patient Identification:  Verona Avila  87 y.o.  female  1934  [id number]            Requesting physician: Dr. Twin Minor    Reason for Consultation: Acute hypoxic respiratory failure    CC: Back pain    History of Present Illness:  Patient is a 87-year-old female with a previous medical history of smoking quitting 50 years ago smoked for about 20 years no prior pulmonary history.  No COPD asthma.  No known interstitial lung disease per her report.  Never needed oxygen before per report.  She is presented to the ER with worsening back pain.  She said this is quite severe for her.  She denies any hemoptysis cough sputum production fever chills.  Symptoms getting progressively worse.  She does not feel short of breath with conversation her biggest complaint is her back pain.    She has been admitted where her BNP was about 6000  CT scan angiogram shows no pulmonary embolism.  No significant signs of interstitial lung disease.  Very faint bibasilar reticulation/infiltrate suggestive of atelectasis or mild subpleural scarring.    Per chart review she does have a history of rheumatoid arthritis.  Unaware of any ILD  Of note she is on plaquenil.      Review of Systems:  CONSTITUTIONAL:  Denies fevers or chills  EYE:  No new vision changes  EAR:  No change in hearing  CARDIAC:  No chest pain  PULMONARY:  No productive cough or shortness of breath  GI:  No diarrhea, hematemesis or hematochezia,  RENAL:  No dysuria or urinary frequency  MUSCULOSKELETAL:  +back pain  ENDOCRINE:  No heat or cold intolerance  INTEGUMENTARY: No skin rashes  NEUROLOGICAL:  No dizziness or confusion.  No seizure activity  PSYCHIATRIC:  No new anxiety or depression  12 system review of systems performed and all else negative    Past Medical History:   Diagnosis Date   • Arthritis    • Atherosclerosis    • Depression    • Difficulty walking    • Disease of thyroid gland    • Hypertension    • Muscle weakness    •  "Spinal stenosis        Past Surgical History:   Procedure Laterality Date   • THYROID SURGERY     • TONSILLECTOMY          Medications Prior to Admission   Medication Sig Dispense Refill Last Dose   • aspirin 81 MG chewable tablet Chew 1 tablet Daily. 30 tablet 0    • atorvastatin (LIPITOR) 20 MG tablet Take 20 mg by mouth daily.      • carvedilol (COREG) 3.125 MG tablet Take 1 tablet by mouth 2 (Two) Times a Day With Meals. 60 tablet 0    • clopidogrel (PLAVIX) 75 MG tablet Take 1 tablet by mouth Daily. 30 tablet 0    • HYDROcodone-acetaminophen (NORCO) 5-325 MG per tablet Take 1/2 to 1 tablet q6h as needed for pain. (Patient taking differently: 1 tablet Every 4 (Four) Hours As Needed. Take 1/2 to 1 tablet q6h as needed for pain.) 12 tablet 0    • hydroxychloroquine (PLAQUENIL) 200 MG tablet Take  by mouth daily.      • losartan (COZAAR) 25 MG tablet Take 1 tablet by mouth Daily. 30 tablet 0    • pantoprazole (PROTONIX) 40 MG EC tablet Take 1 tablet by mouth Every Morning. 30 tablet 0    • PARoxetine (PAXIL) 20 MG tablet Take 20 mg by mouth Every Night.      • acetaminophen (TYLENOL) 650 MG 8 hr tablet Take 650 mg by mouth Every 6 (Six) Hours As Needed for Mild Pain .      • levothyroxine (SYNTHROID, LEVOTHROID) 88 MCG tablet Take 88 mcg by mouth Daily.          No Known Allergies    Social History     Socioeconomic History   • Marital status:    Tobacco Use   • Smoking status: Former Smoker   • Smokeless tobacco: Never Used   Substance and Sexual Activity   • Alcohol use: No   • Drug use: No       History reviewed. No pertinent family history.    Physical Exam:  /67 (BP Location: Left arm, Patient Position: Lying)   Pulse 75   Temp 98 °F (36.7 °C) (Oral)   Resp 16   Ht 154.9 cm (61\")   Wt 50.8 kg (111 lb 15.9 oz)   SpO2 93%   BMI 21.16 kg/m²   Body mass index is 21.16 kg/m².     General appearance: non toxic, conversant   Eyes: anicteric sclerae, moist conjunctivae; no lid-lag;    HENT: " Atraumatic; oropharynx clear with moist mucous membranes    Neck: Trachea midline;   Lungs: bibasilar crackles fine, with normal respiratory effort and no intercostal retractions  CV: RRR, no rub cant hear loud murmurs either  Abdomen: Soft, non-rigid bs+  Extremities: No peripheral edema or extremity lymphadenopathy +hip pain  Skin: warm dry chronic changes to ble   Psych: Appropriate affect, alert and oriented  Neuro: cns 2-12 grossly intact moves exts  LABS:  Results from last 7 days   Lab Units 04/02/22  0540 03/31/22  1710   WBC 10*3/mm3 8.24 11.17*   HEMOGLOBIN g/dL 10.1* 11.5*   PLATELETS 10*3/mm3 255 277     Results from last 7 days   Lab Units 04/02/22  0540 03/31/22  1710   SODIUM mmol/L 139 137   POTASSIUM mmol/L 3.7 4.1   CHLORIDE mmol/L 105 104   CO2 mmol/L 24.4 21.0*   BUN mg/dL 20 25*   CREATININE mg/dL 0.94 0.96   GLUCOSE mg/dL 91 118*   CALCIUM mg/dL 8.3* 9.1   MAGNESIUM mg/dL  --  2.0   Estimated Creatinine Clearance: 33.8 mL/min (by C-G formula based on SCr of 0.94 mg/dL).    Imaging: I personally visualized the images of scans/x-rays performed within last 3 days.  Imaging Results (Most Recent)     Procedure Component Value Units Date/Time    CT Angiogram Chest [[id number]] Collected: 03/31/22 2011     Updated: 03/31/22 2023    Narrative:      CT ANGIOGRAM CHEST-     Radiation dose reduction techniques were utilized, including automated  exposure control and exposure modulation based on body size.     Clinical: Hypoxia, rule out pulmonary embolism     COMPARISON: None     CT scan of the chest performed with intravenous contrast, pulmonary  embolus protocol to include coronal, sagittal and three-dimensional  reconstruction.     FINDINGS: There is cardiac enlargement. No pericardial effusion. There  is coronary artery calcification. No pulmonary embolus. Atherosclerotic  calcification of the aorta diameter within normal limits. There is heavy  atherosclerotic plaque formation at the origin of the  right subclavian  artery. Esophagus is satisfactory in course and caliber. No mediastinal  mass nor lymphadenopathy.     There are linear areas of scarring versus discoid atelectasis at both  lung bases. No pleural effusion. No pulmonary edema. No lung  consolidation or suspicious pulmonary parenchymal lesion. Bullae  formation compatible with emphysema. There is bleb formation as well as  bronchiectasis.     CONCLUSION:  1. Cardiac enlargement.  2. No pulmonary embolus, aortic aneurysm nor dissection.  3. Scarring demonstrated at both lung bases. There is bleb and bullae  formation compatible with emphysema, nominal bronchiectasis.        This report was finalized on 3/31/2022 8:20 PM by Dr. Johnathon Saldivar M.D.       XR Spine Lumbar Complete 4+VW [[id number]] Collected: 03/31/22 2003     Updated: 03/31/22 2011    Narrative:      LUMBAR SPINE SERIES     CLINICAL HISTORY: Trauma. Back pain.     6 views were obtained.     Compared to previous lumbar spine x-rays dated 02/21/2019.     There are extensive multilevel degenerative disc changes that result in  moderate dextroscoliosis centered at the elbow to Patricia 3 level that  appears slightly more prominent than on the previous lumbar spine  series. There is marked disc space narrowing at the L3-L4 level that  appears slightly more prominent. Marked disc space narrowing is also  noted at L2-L3 that is most prominent on the left where there is bony  spurring bridging the narrowed disc space. The remainder the lumbar disc  spaces are only mildly narrowed. All of the vertebral bodies appear  within normal limits in height. There is no evidence recent or old  fracture. Vascular calcification is noted.     IMPRESSIONS: Moderately extensive multilevel degenerative disc changes  showing slight progression since 02/21/2019. No acute bony abnormality  is identified.     This report was finalized on 3/31/2022 8:07 PM by Dr. Erik Ceballos M.D.       XR Chest 1 View  [[id number]] Collected: 03/31/22 1746     Updated: 03/31/22 1752    Narrative:      XR CHEST 1 VW-     Clinical: Shortness of breath     COMPARISON 11/18/2021     FINDINGS: Heart size upper limits of normal to mildly enlarged, there is  atherosclerotic calcification of the aorta as before. There is an area  of nodularity in the right paratracheal space which probably secondary  to normal vascular structures, the patient is rotated towards right.     No pulmonary edema or pleural effusion seen. No acute airspace disease  identified.     CONCLUSION: No acute cardiovascular or pulmonary process is  demonstrated.     This report was finalized on 3/31/2022 5:49 PM by Dr. Johnathon Saldivar M.D.             Assessment / Recommendations:  Acute Hypoxic respiratory failure   CKD  Hypothyroidism  Hypertension  Osteoarthritis  Rheumatoid arthritis  Depression  Diastolic HF   Former smoker  Abnormal ct chest    She has fine crackles at bases of both lungs, abnormal ct chest and bnp elevated.  She doesn't have sig ggo.  I suspect that she may have slight pulm vascular congestion, she is on lasix for this and cards evaluating, atelectasis secondary to her acute back pain and there is a question if she has some baseline CT ILD from her RA.  This doesn't appear to be flaring however.  She will need a high res ct chest with insp exp proning when she is able to do this from a back pain perspective and she will need outpt pfts.  In mean time would cont cardiology evaluation.  Will follow with.    Consult called to [facility]PC at 447pm 4-2-22, patient seen at 6pm 4-2-22.       Ronnie Christian MD  [facility]  04/02/22  16:47 EDT

## 2022-04-02 NOTE — PLAN OF CARE
Goal Outcome Evaluation:  Plan of Care Reviewed With: patient        Progress: no change  Pt is an 86 y/o female presenting to [facility] [place] with severe back pain as she fell 3 weeks ago. Pt lives at an independent assisted living facility. Reports independence in mobility with rollator use. On eval, performed bed mobility and transfers with modA. Unable to initiate stepping at EOB, shuffled feet before quickly returning to sitting d/t back pain. SpO2 noted to maintain 88-90% on 1L NC O2 throughout session. Functional mobility limited 2/2 pain and decrease strength, balance, and activity tolerance. Pt will benefit from skilled PT to improve upon functional status prior to d/c to SNF.

## 2022-04-02 NOTE — PROGRESS NOTES
"Daily progress note    Chief complaint  Doing same  No new complaints  Denies chest pain shortness of breath palpitation  Still with back pain  Fully alert oriented  No respiratory distress    History of present illness  87-year-old white female with history of osteoarthritis rheumatoid arthritis degenerative disease hypertension hyperlipidemia and gastroesophageal reflux disease who is well-known to our service presented to [facility] emergency room with severe back pain as she fell 3 weeks ago.  Patient denies any radiation of the pain.  Patient work-up in ER revealed hypoxia but she denies any chest pain shortness of breath palpitation.  Patient also denies any fever cough congestion night sweats weight loss or weight gain.  Patient admitted for management.    REVIEW OF SYSTEMS  All systems reviewed and negative except for those discussed in HPI.      PHYSICAL EXAM  Blood pressure 138/67, pulse 75, temperature 98 °F (36.7 °C), temperature source Oral, resp. rate 16, height 154.9 cm (61\"), weight 50.8 kg (111 lb 15.9 oz), SpO2 93 %.    General: No acute distress.  HENT: NCAT, PERRL, Nares patent.  Eyes: no scleral icterus.  Neck: trachea midline, no ROM limitations.  CV: regular rhythm, regular rate.  Respiratory: normal effort, inspiratory crackles at bilateral lung bases.  Abdomen: soft, nondistended nontender bowel sounds positive  Musculoskeletal: no deformity.  Lumbar spine: No step-offs or deformities, no midline tenderness to palpation, bilateral paraspinal tenderness to palpation.  5 out of 5 strength with sensation intact to light touch bilateral lower extremities.  Neuro: alert, moves all extremities, follows commands.  Skin: warm, dry.  Discoloration of bilateral legs which patient states is chronic, no signs of infection, nonpitting edema bilateral lower extremities     LAB RESULTS  Lab Results (last 24 hours)     Procedure Component Value Units Date/Time    Hemoglobin A1c [[id number]]  " (Abnormal) Collected: 04/02/22 0540    Specimen: Blood Updated: 04/02/22 1015     Hemoglobin A1C 5.70 %     Narrative:      Hemoglobin A1C Ranges:    Increased Risk for Diabetes  5.7% to 6.4%  Diabetes                     >= 6.5%  Diabetic Goal                < 7.0%    Comprehensive Metabolic Panel [[id number]]  (Abnormal) Collected: 04/02/22 0540    Specimen: Blood Updated: 04/02/22 0700     Glucose 91 mg/dL      BUN 20 mg/dL      Creatinine 0.94 mg/dL      Sodium 139 mmol/L      Potassium 3.7 mmol/L      Chloride 105 mmol/L      CO2 24.4 mmol/L      Calcium 8.3 mg/dL      Total Protein 6.3 g/dL      Albumin 3.10 g/dL      ALT (SGPT) 17 U/L      AST (SGOT) 25 U/L      Alkaline Phosphatase 116 U/L      Total Bilirubin 0.2 mg/dL      Globulin 3.2 gm/dL      A/G Ratio 1.0 g/dL      BUN/Creatinine Ratio 21.3     Anion Gap 9.6 mmol/L      eGFR 58.8 mL/min/1.73      Comment: National Kidney Foundation and American Society of Nephrology (ASN) Task Force recommended calculation based on the Chronic Kidney Disease Epidemiology Collaboration (CKD-EPI) equation refit without adjustment for race.       Narrative:      GFR Normal >60  Chronic Kidney Disease <60  Kidney Failure <15      Lipid Panel [[id number]]  (Abnormal) Collected: 04/02/22 0540    Specimen: Blood Updated: 04/02/22 0700     Total Cholesterol 106 mg/dL      Triglycerides 68 mg/dL      HDL Cholesterol 38 mg/dL      LDL Cholesterol  53 mg/dL      VLDL Cholesterol 15 mg/dL      LDL/HDL Ratio 1.43    Narrative:      Cholesterol Reference Ranges  (U.S. Department of Health and Human Services ATP III Classifications)    Desirable          <200 mg/dL  Borderline High    200-239 mg/dL  High Risk          >240 mg/dL      Triglyceride Reference Ranges  (U.S. Department of Health and Human Services ATP III Classifications)    Normal           <150 mg/dL  Borderline High  150-199 mg/dL  High             200-499 mg/dL  Very High        >500 mg/dL    HDL Reference  Ranges  (U.S. Department of Health and Human Services ATP III Classifications)    Low     <40 mg/dl (major risk factor for CHD)  High    >60 mg/dl ('negative' risk factor for CHD)        LDL Reference Ranges  (U.S. Department of Health and Human Services ATP III Classifications)    Optimal          <100 mg/dL  Near Optimal     100-129 mg/dL  Borderline High  130-159 mg/dL  High             160-189 mg/dL  Very High        >189 mg/dL    Troponin [[id number]]  (Normal) Collected: 04/02/22 0540    Specimen: Blood Updated: 04/02/22 0700     Troponin T <0.010 ng/mL     Narrative:      Troponin T Reference Range:  <= 0.03 ng/mL-   Negative for AMI  >0.03 ng/mL-     Abnormal for myocardial necrosis.  Clinicians would have to utilize clinical acumen, EKG, Troponin and serial changes to determine if it is an Acute Myocardial Infarction or myocardial injury due to an underlying chronic condition.       Results may be falsely decreased if patient taking Biotin.      TSH [[id number]]  (Normal) Collected: 04/02/22 0540    Specimen: Blood Updated: 04/02/22 0649     TSH 0.483 uIU/mL     CBC & Differential [[id number]]  (Abnormal) Collected: 04/02/22 0540    Specimen: Blood Updated: 04/02/22 0618    Narrative:      The following orders were created for panel order CBC & Differential.  Procedure                               Abnormality         Status                     ---------                               -----------         ------                     CBC Auto Differential[[id number]]        Abnormal            Final result                 Please view results for these tests on the individual orders.    CBC Auto Differential [[id number]]  (Abnormal) Collected: 04/02/22 0540    Specimen: Blood Updated: 04/02/22 0618     WBC 8.24 10*3/mm3      RBC 3.79 10*6/mm3      Hemoglobin 10.1 g/dL      Hematocrit 32.5 %      MCV 85.8 fL      MCH 26.6 pg      MCHC 31.1 g/dL      RDW 13.4 %      RDW-SD 42.4 fl      MPV 10.6 fL      Platelets 255  10*3/mm3      Neutrophil % 61.0 %      Lymphocyte % 18.2 %      Monocyte % 13.7 %      Eosinophil % 6.2 %      Basophil % 0.4 %      Immature Grans % 0.5 %      Neutrophils, Absolute 5.03 10*3/mm3      Lymphocytes, Absolute 1.50 10*3/mm3      Monocytes, Absolute 1.13 10*3/mm3      Eosinophils, Absolute 0.51 10*3/mm3      Basophils, Absolute 0.03 10*3/mm3      Immature Grans, Absolute 0.04 10*3/mm3      nRBC 0.0 /100 WBC     Urinalysis With Microscopic If Indicated (No Culture) - Urine, Clean Catch [[id number]]  (Normal) Collected: 04/01/22 2231    Specimen: Urine, Clean Catch Updated: 04/01/22 2247     Color, UA Yellow     Appearance, UA Clear     pH, UA <=5.0     Specific Gravity, UA 1.016     Glucose, UA Negative     Ketones, UA Negative     Bilirubin, UA Negative     Blood, UA Negative     Protein, UA Negative     Leuk Esterase, UA Negative     Nitrite, UA Negative     Urobilinogen, UA 0.2 E.U./dL    Narrative:      Urine microscopic not indicated.        Imaging Results (Last 24 Hours)     ** No results found for the last 24 hours. **             ECG 12 Lead  Component   Ref Range & Units 3/31/22 1736 11/20/21 0628 11/18/21 1414   QT Interval   ms 463  403  415              HEART RATE= 71  bpm  RR Interval= 845  ms  WA Interval= 145  ms  P Horizontal Axis= -26  deg  P Front Axis= 68  deg  QRSD Interval= 134  ms  QT Interval= 463  ms  QRS Axis= -7  deg  T Wave Axis= 59  deg  - ABNORMAL ECG -  Sinus rhythm  Left atrial enlargement  Right bundle branch block  No change from previous tracing             Current Facility-Administered Medications:   •  aspirin chewable tablet 81 mg, 81 mg, Oral, Daily, Twin Minor MD, 81 mg at 04/02/22 0818  •  atorvastatin (LIPITOR) tablet 10 mg, 10 mg, Oral, Nightly, Twin Minor MD, 10 mg at 04/01/22 2101  •  carvedilol (COREG) tablet 3.125 mg, 3.125 mg, Oral, BID With Meals, Twin Minor MD, 3.125 mg at 04/02/22 0817  •  clopidogrel (PLAVIX) tablet 75 mg, 75 mg, Oral, Daily,  Jonny Minor MD, 75 mg at 04/02/22 0817  •  furosemide (LASIX) tablet 20 mg, 20 mg, Oral, BID, Jonny Minor MD, 20 mg at 04/02/22 0818  •  guaiFENesin (MUCINEX) 12 hr tablet 600 mg, 600 mg, Oral, Q12H, Jonny Minor MD, 600 mg at 04/02/22 0818  •  HYDROcodone-acetaminophen (NORCO) 5-325 MG per tablet 1 tablet, 1 tablet, Oral, Q6H PRN, Jonny Minor MD, 1 tablet at 04/01/22 2101  •  HYDROmorphone (DILAUDID) injection 0.5 mg, 0.5 mg, Intravenous, Q4H PRN, Jonny Minor MD  •  hydroxychloroquine (PLAQUENIL) tablet 200 mg, 200 mg, Oral, Daily, Jonny Minor MD, 200 mg at 04/02/22 0817  •  ipratropium-albuterol (DUO-NEB) nebulizer solution 3 mL, 3 mL, Nebulization, Q4H PRN, Jonny Minor MD  •  levothyroxine (SYNTHROID, LEVOTHROID) tablet 88 mcg, 88 mcg, Oral, QAM, Jonny Minor MD, 88 mcg at 04/02/22 0621  •  losartan (COZAAR) tablet 100 mg, 100 mg, Oral, Q24H, Jonny Minor MD, 100 mg at 04/02/22 0817  •  pantoprazole (PROTONIX) EC tablet 40 mg, 40 mg, Oral, Q AM, Jonny Minor MD, 40 mg at 04/02/22 0621  •  PARoxetine (PAXIL) tablet 20 mg, 20 mg, Oral, Nightly, Jonny Minor MD, 20 mg at 04/01/22 2101     ASSESSMENT  Acute hypoxic respiratory failure  New onset congestive heart failure  Hypertension  Hyperlipidemia  Hypothyroidism  Osteoarthritis  Rheumatoid arthritis  Degenerative disc disease  Depression  Gastroesophageal reflux disease    PLAN  CPM  Supplement oxygen  Nebulizer  IV diuresis  Serial cardiac enzymes and EKG  Check 2D echo  Pain management  Cardiology consult appreciated  Pulmonary consult pending  Adjust home medications  Stress ulcer DVT prophylaxis  Supportive care  DNR  PT OT  Discussed with family and nursing staff  Follow closely further recommendation current hospital course    JONNY MINOR MD

## 2022-04-02 NOTE — THERAPY EVALUATION
Patient Name: Verona Avila  : 1934    MRN: [id number]                              Today's Date: 2022       Admit Date: 3/31/2022    Visit Dx:     ICD-10-CM ICD-9-CM   1. Hypoxia  R09.02 799.02   2. Acute bilateral low back pain without sciatica  M54.50 724.2     338.19     Patient Active Problem List   Diagnosis   • Syncope   • Hypoxia     Past Medical History:   Diagnosis Date   • Arthritis    • Atherosclerosis    • Depression    • Difficulty walking    • Disease of thyroid gland    • Hypertension    • Muscle weakness    • Spinal stenosis      Past Surgical History:   Procedure Laterality Date   • THYROID SURGERY     • TONSILLECTOMY        General Information     Row Name 22 1626          Physical Therapy Time and Intention    Document Type evaluation  -     Mode of Treatment physical therapy;individual therapy  -     Row Name 22 162          General Information    Patient Profile Reviewed yes  -     Prior Level of Function independent:;all household mobility;community mobility;min assist:;ADL's  Lives at independent living facilty  -     Existing Precautions/Restrictions fall;oxygen therapy device and L/min  -     Barriers to Rehab previous functional deficit  -     Row Name 22 162          Living Environment    People in Home other (see comments)  Independent living facility  -     Row Name 22 1626          Home Main Entrance    Number of Stairs, Main Entrance none  -     Row Name 22 1626          Stairs Within Home, Primary    Number of Stairs, Within Home, Primary none  -     Row Name 22 1626          Cognition    Orientation Status (Cognition) oriented x 3  -     Row Name 22 1626          Safety Issues, Functional Mobility    Safety Issues Affecting Function (Mobility) insight into deficits/self-awareness;safety precautions follow-through/compliance;awareness of need for assistance  -     Impairments Affecting Function (Mobility)  balance;endurance/activity tolerance;strength;pain  -           User Key  (r) = Recorded By, (t) = Taken By, (c) = Cosigned By    Initials Name Provider Type    Tatiana Bucio PT Physical Therapist               Mobility     Row Name 04/02/22 1627          Bed Mobility    Bed Mobility supine-sit;sit-supine  -     Supine-Sit [place] (Bed Mobility) verbal cues;moderate assist (50% patient effort)  -     Sit-Supine [place] (Bed Mobility) verbal cues;moderate assist (50% patient effort)  -     Assistive Device (Bed Mobility) bed rails;head of bed elevated  -     Comment, (Bed Mobility) Trunk elevation into sitting and BLE management when returning to supine  -     Row Name 04/02/22 1627          Bed-Chair Transfer    Bed-Chair [place] (Transfers) not tested  -[facility] Name 04/02/22 1627          Sit-Stand Transfer    Sit-Stand [place] (Transfers) moderate assist (50% patient effort);verbal cues  -     Assistive Device (Sit-Stand Transfers) walker, front-wheeled  -     Comment, (Sit-Stand Transfer) VC for hand placement  -KH     Row Name 04/02/22 1627          Gait/Stairs (Locomotion)    [place] Level (Gait) not tested  -     Comment, (Gait/Stairs) Attempted to take side steps at HOB, increased difficutly to clear feet and performed shuffling of feet before quickly returning sitting EOB d/t significant low back pain  -           User Key  (r) = Recorded By, (t) = Taken By, (c) = Cosigned By    Initials Name Provider Type    Tatiana Bucio PT Physical Therapist               Obj/Interventions     [facility] Name 04/02/22 1629          Range of Motion Comprehensive    General Range of Motion no range of motion deficits identified  -[facility] Name 04/02/22 1629          Strength Comprehensive (MMT)    Comment, General Manual Muscle Testing (MMT) Assessment generalized BLE weakness  -[facility] Name 04/02/22 1629          Balance    Balance Assessment sitting static  balance;standing static balance;standing dynamic balance  -     Static Sitting Balance supervision  -KH     Position, Sitting Balance sitting edge of bed  -KH     Static Standing Balance moderate assist;verbal cues  -KH     Dynamic Standing Balance moderate assist;verbal cues  -KH     Position/Device Used, Standing Balance walker, rolling  -KH     Row Name 04/02/22 1629          Sensory Assessment (Somatosensory)    Sensory Assessment (Somatosensory) sensation intact  -           User Key  (r) = Recorded By, (t) = Taken By, (c) = Cosigned By    Initials Name Provider Type    Tatiana Bucio PT Physical Therapist               Goals/Plan     Row Name 04/02/22 1631          Bed Mobility Goal 1 (PT)    Activity/Assistive Device (Bed Mobility Goal 1, PT) bed mobility activities, all  -KH     [place] Level/Cues Needed (Bed Mobility Goal 1, PT) independent  -KH     Time Frame (Bed Mobility Goal 1, PT) 2 weeks  -     Row Name 04/02/22 1631          Transfer Goal 1 (PT)    Activity/Assistive Device (Transfer Goal 1, PT) transfers, all;walker, rolling  -KH     [place] Level/Cues Needed (Transfer Goal 1, PT) modified independence  -KH     Time Frame (Transfer Goal 1, PT) 2 weeks  -     Row Name 04/02/22 1631          Gait Training Goal 1 (PT)    Activity/Assistive Device (Gait Training Goal 1, PT) gait (walking locomotion);assistive device use;walker, rolling  -KH     [place] Level (Gait Training Goal 1, PT) standby assist  -KH     Distance (Gait Training Goal 1, PT) 75ft  -KH     Time Frame (Gait Training Goal 1, PT) 2 weeks  -           User Key  (r) = Recorded By, (t) = Taken By, (c) = Cosigned By    Initials Name Provider Type    Tatiana Bucio, PT Physical Therapist               Clinical Impression     Row Name 04/02/22 1629          Pain    Pretreatment Pain Rating 7/10  -KH     Posttreatment Pain Rating 8/10  -KH     Pain Location lower  -     Pain Location - back  -     Pain  Intervention(s) Ambulation/increased activity;Repositioned  -     Row Name 04/02/22 1629          Plan of Care Review    Plan of Care Reviewed With patient  -     Progress no change  -     Outcome Evaluation Pt is an 86 y/o female presenting to [facility] with severe back pain as she fell 3 weeks ago. Pt lives at an independent assisted living facility. Reports independence in mobility with rollator use. On eval, performed bed mobility and transfers with modA. Unable to initiate stepping at EOB, shuffled feet before quickly returning to sitting d/t back pain. SpO2 noted to maintain 88-90% on 1L NC O2 throughout session. Functional mobility limited 2/2 pain and decrease strength, balance, and activity tolerance. Pt will benefit from skilled PT to improve upon functional status prior to d/c to SNF.  -     Row Name 04/02/22 1629          Therapy Assessment/Plan (PT)    Rehab Potential (PT) good, to achieve stated therapy goals  -     Criteria for Skilled Interventions Met (PT) yes;skilled treatment is necessary  -     Predicted Duration of Therapy Intervention (PT) 2 weeks  -     Row Name 04/02/22 1629          Vital Signs    Pre SpO2 (%) 90  -KH     O2 Delivery Pre Treatment supplemental O2  1L NC  -KH     Intra SpO2 (%) 88  -KH     O2 Delivery Intra Treatment supplemental O2  -KH     Post SpO2 (%) 90  -KH     O2 Delivery Post Treatment supplemental O2  -KH     Row Name 04/02/22 1629          Positioning and Restraints    Pre-Treatment Position in bed  -     Post Treatment Position bed  -KH     In Bed notified nsg;side lying left;call light within reach;encouraged to call for assist;exit alarm on  -           User Key  (r) = Recorded By, (t) = Taken By, (c) = Cosigned By    Initials Name Provider Type    Tatiana Bucio, PT Physical Therapist               Outcome Measures     Row Name 04/02/22 1631          How much help from another person do you currently need...    Turning from your back to your  side while in flat bed without using bedrails? 3  -KH     Moving from lying on back to sitting on the side of a flat bed without bedrails? 2  -KH     Moving to and from a bed to a chair (including a wheelchair)? 2  -KH     Standing up from a chair using your arms (e.g., wheelchair, bedside chair)? 3  -KH     Climbing 3-5 steps with a railing? 1  -KH     To walk in hospital room? 2  -KH     AM-PAC 6 Clicks Score (PT) 13  -     Row Name 04/02/22 1631          Functional Assessment    Outcome Measure Options AM-PAC 6 Clicks Basic Mobility (PT)  -           User Key  (r) = Recorded By, (t) = Taken By, (c) = Cosigned By    Initials Name Provider Type    Tatiana Bucio PT Physical Therapist                             Physical Therapy Education                 Title: PT OT SLP Therapies (In Progress)     Topic: Physical Therapy (In Progress)     Point: Mobility training (Done)     Learning Progress Summary           Patient Acceptance, E, VU,NR by SHERLYN at 4/2/2022 1632                   Point: Home exercise program (Not Started)     Learner Progress:  Not documented in this visit.          Point: Body mechanics (Done)     Learning Progress Summary           Patient Acceptance, E, VU,NR by SHERLYN at 4/2/2022 1632                   Point: Precautions (Not Started)     Learner Progress:  Not documented in this visit.                      User Key     Initials Effective Dates Name Provider Type [facility] 11/03/21 -  Tatiana Madera PT Physical Therapist PT              PT Recommendation and Plan  Planned Therapy Interventions (PT): balance training, bed mobility training, gait training, home exercise program, patient/family education, postural re-education, ROM (range of motion), strengthening, transfer training  Plan of Care Reviewed With: patient  Progress: no change  Outcome Evaluation: Pt is an 88 y/o female presenting to [facility] with severe back pain as she fell 3 weeks ago. Pt lives at an independent assisted  living facility. Reports independence in mobility with rollator use. On eval, performed bed mobility and transfers with modA. Unable to initiate stepping at EOB, shuffled feet before quickly returning to sitting d/t back pain. SpO2 noted to maintain 88-90% on 1L NC O2 throughout session. Functional mobility limited 2/2 pain and decrease strength, balance, and activity tolerance. Pt will benefit from skilled PT to improve upon functional status prior to d/c to SNF.     Time Calculation:    PT Charges     Row Name 04/02/22 1632             Time Calculation    Start Time 1446  -KH      Stop Time 1504  -KH      Time Calculation (min) 18 min  -KH      PT Received On 04/02/22  -      PT - Next Appointment 04/04/22  -      PT Goal Re-Cert Due Date 04/16/22  -              Time Calculation- PT    Total Timed Code Minutes- PT 10 minute(s)  -KH              Timed Charges    77217 - PT Therapeutic Activity Minutes 10  -KH              Total Minutes    Timed Charges Total Minutes 10  -KH       Total Minutes 10  -KH            User Key  (r) = Recorded By, (t) = Taken By, (c) = Cosigned By    Initials Name Provider Type    Tatiana Bucio, PT Physical Therapist              Therapy Charges for Today     Code Description Service Date Service Provider Modifiers Qty    87235209980 HC PT EVAL MOD COMPLEXITY 3 4/2/2022 Tatiana Corrales, PT GP 1    73391956356 HC PT THERAPEUTIC ACT EA 15 MIN 4/2/2022 Tatiana Corrales, PT GP 1          PT G-Codes  Outcome Measure Options: AM-PAC 6 Clicks Basic Mobility (PT)  AM-PAC 6 Clicks Score (PT): 13    TATIANA CORRALES PT  4/2/2022

## 2022-04-02 NOTE — PROGRESS NOTES
"   LOS: 2 days   Patient Care Team:  Kelsey Prabhakar MD as PCP - General (Internal Medicine)      Chief Complaint:  Following for CHF      Interval History:   Troponin negative this morning.  Denies chest pain or discomfort symptoms.  Denies shortness of breath though remains on nasal cannula O2, pulmonology consulted by primary team.  Awaiting echo.  Kidney function stable.  Losartan dose increased yesterday afternoon.  Recheck BP after medications this AM.          Objective   Vital Signs  Temp:  [97.8 °F (36.6 °C)-98.5 °F (36.9 °C)] 98 °F (36.7 °C)  Heart Rate:  [66-67] 67  Resp:  [16-18] 16  BP: (123-179)/(54-86) 179/86  BP on recheck 145/66    Intake/Output Summary (Last 24 hours) at 4/2/2022 0828  Last data filed at 4/2/2022 0600  Gross per 24 hour   Intake 480 ml   Output 1250 ml   Net -770 ml       Last Weight and Admission Weight        04/01/22  0104   Weight: 50.8 kg (111 lb 15.9 oz)     Flowsheet Rows    Flowsheet Row First Filed Value   Admission Height 154.9 cm (61\") Documented at 03/31/2022 1728   Admission Weight 50.8 kg (112 lb) Documented at 03/31/2022 1728              Physical Exam  Constitutional:       General: She is not in acute distress.  HENT:      Nose:      Comments: NC O2 in place  Cardiovascular:      Rate and Rhythm: Normal rate and regular rhythm.      Heart sounds: Normal heart sounds.   Pulmonary:      Effort: Pulmonary effort is normal. No respiratory distress.      Breath sounds: Normal breath sounds.   Abdominal:      General: Bowel sounds are normal. There is no distension.      Palpations: Abdomen is soft.   Musculoskeletal:         General: No swelling or tenderness.      Cervical back: Neck supple.   Skin:     General: Skin is warm and dry.      Capillary Refill: Capillary refill takes less than 2 seconds.   Neurological:      Mental Status: She is alert and oriented to person, place, and time.             Results Review:      Results from last 7 days   Lab Units " 04/02/22  0540 03/31/22  1710   SODIUM mmol/L 139 137   POTASSIUM mmol/L 3.7 4.1   CHLORIDE mmol/L 105 104   CO2 mmol/L 24.4 21.0*   BUN mg/dL 20 25*   CREATININE mg/dL 0.94 0.96   GLUCOSE mg/dL 91 118*   CALCIUM mg/dL 8.3* 9.1     Results from last 7 days   Lab Units 04/02/22  0540 03/31/22  1710   TROPONIN T ng/mL <0.010 0.015     Results from last 7 days   Lab Units 04/02/22  0540 03/31/22  1710   WBC 10*3/mm3 8.24 11.17*   HEMOGLOBIN g/dL 10.1* 11.5*   HEMATOCRIT % 32.5* 35.4   PLATELETS 10*3/mm3 255 277     Results from last 7 days   Lab Units 03/31/22  1710   INR  1.04   APTT seconds 29.9     Results from last 7 days   Lab Units 04/02/22  0540   CHOLESTEROL mg/dL 106     Results from last 7 days   Lab Units 03/31/22  1710   MAGNESIUM mg/dL 2.0     Results from last 7 days   Lab Units 04/02/22  0540   CHOLESTEROL mg/dL 106   TRIGLYCERIDES mg/dL 68   HDL CHOL mg/dL 38*   LDL CHOL mg/dL 53               Medication Review:   aspirin, 81 mg, Oral, Daily  atorvastatin, 10 mg, Oral, Nightly  carvedilol, 3.125 mg, Oral, BID With Meals  clopidogrel, 75 mg, Oral, Daily  furosemide, 20 mg, Oral, BID  guaiFENesin, 600 mg, Oral, Q12H  hydroxychloroquine, 200 mg, Oral, Daily  levothyroxine, 88 mcg, Oral, QAM  losartan, 100 mg, Oral, Q24H  pantoprazole, 40 mg, Oral, Q AM  PARoxetine, 20 mg, Oral, Nightly             Assessment/Plan   1. CHF: Appears improved with p.o. Lasix.   Awaiting follow-up echo to be done today.  Possibly to get stress test Monday per primary cardiology team?  2. Hypertension: Losartan dose increased yesterday.  Continue to monitor.  3. Acute hypoxic respiratory failure: Pulmonology consulted by primary team  4. Hyperlipidemia            Thanks,    Kristin Heath DNP, APRN  04/02/22  08:28 EDT

## 2022-04-03 LAB
ANION GAP SERPL CALCULATED.3IONS-SCNC: 9 MMOL/L (ref 5–15)
AORTIC DIMENSIONLESS INDEX: 0.5 (DI)
BASOPHILS # BLD AUTO: 0.04 10*3/MM3 (ref 0–0.2)
BASOPHILS NFR BLD AUTO: 0.5 % (ref 0–1.5)
BH CV ECHO MEAS - ACS: 1.33 CM
BH CV ECHO MEAS - AO MAX PG: 7.7 MMHG
BH CV ECHO MEAS - AO MEAN PG: 4.6 MMHG
BH CV ECHO MEAS - AO ROOT DIAM: 1.83 CM
BH CV ECHO MEAS - AO V2 MAX: 138.7 CM/SEC
BH CV ECHO MEAS - AO V2 VTI: 33.3 CM
BH CV ECHO MEAS - AVA(I,D): 1.62 CM2
BH CV ECHO MEAS - EDV(CUBED): 66.3 ML
BH CV ECHO MEAS - EDV(MOD-SP2): 49 ML
BH CV ECHO MEAS - EDV(MOD-SP4): 65 ML
BH CV ECHO MEAS - EF(MOD-BP): 58.9 %
BH CV ECHO MEAS - EF(MOD-SP2): 59.2 %
BH CV ECHO MEAS - EF(MOD-SP4): 56.9 %
BH CV ECHO MEAS - ESV(CUBED): 13.6 ML
BH CV ECHO MEAS - ESV(MOD-SP2): 20 ML
BH CV ECHO MEAS - ESV(MOD-SP4): 28 ML
BH CV ECHO MEAS - FS: 41 %
BH CV ECHO MEAS - IVS/LVPW: 1.04 CM
BH CV ECHO MEAS - IVSD: 1.29 CM
BH CV ECHO MEAS - LAT PEAK E' VEL: 4.6 CM/SEC
BH CV ECHO MEAS - LV DIASTOLIC VOL/BSA (35-75): 44.2 CM2
BH CV ECHO MEAS - LV MASS(C)D: 182 GRAMS
BH CV ECHO MEAS - LV MAX PG: 2.22 MMHG
BH CV ECHO MEAS - LV MEAN PG: 1.1 MMHG
BH CV ECHO MEAS - LV SYSTOLIC VOL/BSA (12-30): 19 CM2
BH CV ECHO MEAS - LV V1 MAX: 74.4 CM/SEC
BH CV ECHO MEAS - LV V1 VTI: 16.5 CM
BH CV ECHO MEAS - LVIDD: 4 CM
BH CV ECHO MEAS - LVIDS: 2.39 CM
BH CV ECHO MEAS - LVOT AREA: 3.3 CM2
BH CV ECHO MEAS - LVOT DIAM: 2.04 CM
BH CV ECHO MEAS - LVPWD: 1.24 CM
BH CV ECHO MEAS - MED PEAK E' VEL: 3 CM/SEC
BH CV ECHO MEAS - MR MAX PG: 82.6 MMHG
BH CV ECHO MEAS - MR MAX VEL: 454.4 CM/SEC
BH CV ECHO MEAS - MV A MAX VEL: 141.4 CM/SEC
BH CV ECHO MEAS - MV DEC SLOPE: 473 CM/SEC2
BH CV ECHO MEAS - MV DEC TIME: 0.29 MSEC
BH CV ECHO MEAS - MV E MAX VEL: 114 CM/SEC
BH CV ECHO MEAS - MV E/A: 0.81
BH CV ECHO MEAS - MV MAX PG: 7.7 MMHG
BH CV ECHO MEAS - MV MEAN PG: 3 MMHG
BH CV ECHO MEAS - MV V2 VTI: 33 CM
BH CV ECHO MEAS - MVA(VTI): 1.63 CM2
BH CV ECHO MEAS - PA V2 MAX: 80.1 CM/SEC
BH CV ECHO MEAS - RAP SYSTOLE: 8 MMHG
BH CV ECHO MEAS - RV MAX PG: 1.59 MMHG
BH CV ECHO MEAS - RV V1 MAX: 63 CM/SEC
BH CV ECHO MEAS - RV V1 VTI: 14.5 CM
BH CV ECHO MEAS - RVOT DIAM: 1.87 CM
BH CV ECHO MEAS - RVSP: 24 MMHG
BH CV ECHO MEAS - SI(MOD-SP2): 19.7 ML/M2
BH CV ECHO MEAS - SI(MOD-SP4): 25.2 ML/M2
BH CV ECHO MEAS - SV(LVOT): 53.9 ML
BH CV ECHO MEAS - SV(MOD-SP2): 29 ML
BH CV ECHO MEAS - SV(MOD-SP4): 37 ML
BH CV ECHO MEAS - SV(RVOT): 39.6 ML
BH CV ECHO MEAS - TAPSE (>1.6): 2 CM
BH CV ECHO MEAS - TR MAX PG: 16.1 MMHG
BH CV ECHO MEAS - TR MAX VEL: 200.9 CM/SEC
BH CV ECHO MEASUREMENTS AVERAGE E/E' RATIO: 30
BH CV XLRA - TDI S': 9.7 CM/SEC
BUN SERPL-MCNC: 18 MG/DL (ref 8–23)
BUN/CREAT SERPL: 21.4 (ref 7–25)
CALCIUM SPEC-SCNC: 8.3 MG/DL (ref 8.6–10.5)
CHLORIDE SERPL-SCNC: 102 MMOL/L (ref 98–107)
CO2 SERPL-SCNC: 28 MMOL/L (ref 22–29)
CREAT SERPL-MCNC: 0.84 MG/DL (ref 0.57–1)
DEPRECATED RDW RBC AUTO: 40.3 FL (ref 37–54)
EGFRCR SERPLBLD CKD-EPI 2021: 67.4 ML/MIN/1.73
EOSINOPHIL # BLD AUTO: 0.47 10*3/MM3 (ref 0–0.4)
EOSINOPHIL NFR BLD AUTO: 5.8 % (ref 0.3–6.2)
ERYTHROCYTE [DISTWIDTH] IN BLOOD BY AUTOMATED COUNT: 13.3 % (ref 12.3–15.4)
GLUCOSE SERPL-MCNC: 88 MG/DL (ref 65–99)
HCT VFR BLD AUTO: 33.6 % (ref 34–46.6)
HGB BLD-MCNC: 10.8 G/DL (ref 12–15.9)
IMM GRANULOCYTES # BLD AUTO: 0.04 10*3/MM3 (ref 0–0.05)
IMM GRANULOCYTES NFR BLD AUTO: 0.5 % (ref 0–0.5)
LEFT ATRIUM VOLUME INDEX: 19.4 ML/M2
LYMPHOCYTES # BLD AUTO: 1.3 10*3/MM3 (ref 0.7–3.1)
LYMPHOCYTES NFR BLD AUTO: 15.9 % (ref 19.6–45.3)
MAXIMAL PREDICTED HEART RATE: 133 BPM
MCH RBC QN AUTO: 26.8 PG (ref 26.6–33)
MCHC RBC AUTO-ENTMCNC: 32.1 G/DL (ref 31.5–35.7)
MCV RBC AUTO: 83.4 FL (ref 79–97)
MONOCYTES # BLD AUTO: 1.2 10*3/MM3 (ref 0.1–0.9)
MONOCYTES NFR BLD AUTO: 14.7 % (ref 5–12)
NEUTROPHILS NFR BLD AUTO: 5.11 10*3/MM3 (ref 1.7–7)
NEUTROPHILS NFR BLD AUTO: 62.6 % (ref 42.7–76)
NRBC BLD AUTO-RTO: 0 /100 WBC (ref 0–0.2)
NT-PROBNP SERPL-MCNC: 1835 PG/ML (ref 0–1800)
PLATELET # BLD AUTO: 262 10*3/MM3 (ref 140–450)
PMV BLD AUTO: 10.5 FL (ref 6–12)
POTASSIUM SERPL-SCNC: 3.4 MMOL/L (ref 3.5–5.2)
RBC # BLD AUTO: 4.03 10*6/MM3 (ref 3.77–5.28)
SINUS: 2.8 CM
SODIUM SERPL-SCNC: 139 MMOL/L (ref 136–145)
STRESS TARGET HR: 113 BPM
WBC NRBC COR # BLD: 8.16 10*3/MM3 (ref 3.4–10.8)

## 2022-04-03 PROCEDURE — 83880 ASSAY OF NATRIURETIC PEPTIDE: CPT | Performed by: NURSE PRACTITIONER

## 2022-04-03 PROCEDURE — 99232 SBSQ HOSP IP/OBS MODERATE 35: CPT | Performed by: NURSE PRACTITIONER

## 2022-04-03 PROCEDURE — 85025 COMPLETE CBC W/AUTO DIFF WBC: CPT | Performed by: HOSPITALIST

## 2022-04-03 PROCEDURE — 80048 BASIC METABOLIC PNL TOTAL CA: CPT | Performed by: HOSPITALIST

## 2022-04-03 RX ORDER — POTASSIUM CHLORIDE 750 MG/1
20 TABLET, FILM COATED, EXTENDED RELEASE ORAL 2 TIMES DAILY WITH MEALS
Status: DISCONTINUED | OUTPATIENT
Start: 2022-04-03 | End: 2022-04-05

## 2022-04-03 RX ORDER — POTASSIUM CHLORIDE 750 MG/1
20 TABLET, FILM COATED, EXTENDED RELEASE ORAL DAILY
Status: DISCONTINUED | OUTPATIENT
Start: 2022-04-03 | End: 2022-04-03

## 2022-04-03 RX ADMIN — LOSARTAN POTASSIUM 100 MG: 100 TABLET, FILM COATED ORAL at 08:18

## 2022-04-03 RX ADMIN — PAROXETINE HYDROCHLORIDE HEMIHYDRATE 20 MG: 20 TABLET, FILM COATED ORAL at 20:08

## 2022-04-03 RX ADMIN — PANTOPRAZOLE SODIUM 40 MG: 40 TABLET, DELAYED RELEASE ORAL at 06:27

## 2022-04-03 RX ADMIN — CLOPIDOGREL 75 MG: 75 TABLET, FILM COATED ORAL at 08:18

## 2022-04-03 RX ADMIN — HYDROCODONE BITARTRATE AND ACETAMINOPHEN 1 TABLET: 5; 325 TABLET ORAL at 22:05

## 2022-04-03 RX ADMIN — GUAIFENESIN 600 MG: 600 TABLET, EXTENDED RELEASE ORAL at 20:08

## 2022-04-03 RX ADMIN — POTASSIUM CHLORIDE 20 MEQ: 750 TABLET, EXTENDED RELEASE ORAL at 09:06

## 2022-04-03 RX ADMIN — POTASSIUM CHLORIDE 20 MEQ: 750 TABLET, EXTENDED RELEASE ORAL at 17:42

## 2022-04-03 RX ADMIN — HYDROXYCHLOROQUINE SULFATE 200 MG: 200 TABLET ORAL at 08:18

## 2022-04-03 RX ADMIN — ATORVASTATIN CALCIUM 10 MG: 20 TABLET, FILM COATED ORAL at 20:08

## 2022-04-03 RX ADMIN — ASPIRIN 81 MG: 81 TABLET, CHEWABLE ORAL at 08:18

## 2022-04-03 RX ADMIN — GUAIFENESIN 600 MG: 600 TABLET, EXTENDED RELEASE ORAL at 08:18

## 2022-04-03 RX ADMIN — FUROSEMIDE 20 MG: 20 TABLET ORAL at 17:42

## 2022-04-03 RX ADMIN — CARVEDILOL 3.12 MG: 3.12 TABLET, FILM COATED ORAL at 08:18

## 2022-04-03 RX ADMIN — LEVOTHYROXINE SODIUM 88 MCG: 0.09 TABLET ORAL at 06:27

## 2022-04-03 RX ADMIN — FUROSEMIDE 20 MG: 20 TABLET ORAL at 08:18

## 2022-04-03 RX ADMIN — CARVEDILOL 3.12 MG: 3.12 TABLET, FILM COATED ORAL at 17:42

## 2022-04-03 NOTE — PROGRESS NOTES
"Daily progress note    Chief complaint  Doing same  No new complaints  Denies chest pain shortness of breath palpitation  O2 sats 92% 2 L    History of present illness  87-year-old white female with history of osteoarthritis rheumatoid arthritis degenerative disease hypertension hyperlipidemia and gastroesophageal reflux disease who is well-known to our service presented to [facility] emergency room with severe back pain as she fell 3 weeks ago.  Patient denies any radiation of the pain.  Patient work-up in ER revealed hypoxia but she denies any chest pain shortness of breath palpitation.  Patient also denies any fever cough congestion night sweats weight loss or weight gain.  Patient admitted for management.    REVIEW OF SYSTEMS  Unremarkable except back pain     PHYSICAL EXAM  Blood pressure 140/84, pulse 64, temperature 97.8 °F (36.6 °C), temperature source Oral, resp. rate 16, height 154.9 cm (61\"), weight 50.8 kg (111 lb 15.9 oz), SpO2 92 %.    General: No acute distress.  HENT: NCAT, PERRL, Nares patent.  Eyes: no scleral icterus.  Neck: trachea midline, no ROM limitations.  CV: regular rhythm, regular rate.  Respiratory: normal effort, inspiratory crackles at bilateral lung bases.  Abdomen: soft, nondistended nontender bowel sounds positive  Musculoskeletal: no deformity.  Lumbar spine: No step-offs or deformities, no midline tenderness to palpation, bilateral paraspinal tenderness to palpation.  5 out of 5 strength with sensation intact to light touch bilateral lower extremities.  Neuro: alert, moves all extremities, follows commands.  Skin: warm, dry.  Discoloration of bilateral legs which patient states is chronic, no signs of infection, nonpitting edema bilateral lower extremities     LAB RESULTS  Lab Results (last 24 hours)     Procedure Component Value Units Date/Time    BNP [[id number]]  (Abnormal) Collected: 04/03/22 0737    Specimen: Blood Updated: 04/03/22 1346     proBNP 1,835.0 pg/mL     " Narrative:      Among patients with dyspnea, NT-proBNP is highly sensitive for the detection of acute congestive heart failure. In addition NT-proBNP of <300 pg/ml effectively rules out acute congestive heart failure with 99% negative predictive value.    Results may be falsely decreased if patient taking Biotin.      Basic Metabolic Panel [[id number]]  (Abnormal) Collected: 04/03/22 0737    Specimen: Blood Updated: 04/03/22 0831     Glucose 88 mg/dL      BUN 18 mg/dL      Creatinine 0.84 mg/dL      Sodium 139 mmol/L      Potassium 3.4 mmol/L      Chloride 102 mmol/L      CO2 28.0 mmol/L      Calcium 8.3 mg/dL      BUN/Creatinine Ratio 21.4     Anion Gap 9.0 mmol/L      eGFR 67.4 mL/min/1.73      Comment: National Kidney Foundation and American Society of Nephrology (ASN) Task Force recommended calculation based on the Chronic Kidney Disease Epidemiology Collaboration (CKD-EPI) equation refit without adjustment for race.       Narrative:      GFR Normal >60  Chronic Kidney Disease <60  Kidney Failure <15      CBC & Differential [[id number]]  (Abnormal) Collected: 04/03/22 0737    Specimen: Blood Updated: 04/03/22 0810    Narrative:      The following orders were created for panel order CBC & Differential.  Procedure                               Abnormality         Status                     ---------                               -----------         ------                     CBC Auto Differential[[id number]]        Abnormal            Final result                 Please view results for these tests on the individual orders.    CBC Auto Differential [[id number]]  (Abnormal) Collected: 04/03/22 0737    Specimen: Blood Updated: 04/03/22 0810     WBC 8.16 10*3/mm3      RBC 4.03 10*6/mm3      Hemoglobin 10.8 g/dL      Hematocrit 33.6 %      MCV 83.4 fL      MCH 26.8 pg      MCHC 32.1 g/dL      RDW 13.3 %      RDW-SD 40.3 fl      MPV 10.5 fL      Platelets 262 10*3/mm3      Neutrophil % 62.6 %      Lymphocyte % 15.9 %       Monocyte % 14.7 %      Eosinophil % 5.8 %      Basophil % 0.5 %      Immature Grans % 0.5 %      Neutrophils, Absolute 5.11 10*3/mm3      Lymphocytes, Absolute 1.30 10*3/mm3      Monocytes, Absolute 1.20 10*3/mm3      Eosinophils, Absolute 0.47 10*3/mm3      Basophils, Absolute 0.04 10*3/mm3      Immature Grans, Absolute 0.04 10*3/mm3      nRBC 0.0 /100 WBC     T4, Free [[id number]]  (Normal) Collected: 04/02/22 2038    Specimen: Blood Updated: 04/02/22 2110     Free T4 1.56 ng/dL     Narrative:      Results may be falsely increased if patient taking Biotin.          Imaging Results (Last 24 Hours)     ** No results found for the last 24 hours. **             ECG 12 Lead  Component   Ref Range & Units 3/31/22 1736 11/20/21 0628 11/18/21 1414   QT Interval   ms 463  403  415              HEART RATE= 71  bpm  RR Interval= 845  ms  [place] Interval= 145  ms  P Horizontal Axis= -26  deg  P Front Axis= 68  deg  QRSD Interval= 134  ms  QT Interval= 463  ms  QRS Axis= -7  deg  T Wave Axis= 59  deg  - ABNORMAL ECG -  Sinus rhythm  Left atrial enlargement  Right bundle branch block  No change from previous tracing             Current Facility-Administered Medications:   •  aspirin chewable tablet 81 mg, 81 mg, Oral, Daily, Twin Minor MD, 81 mg at 04/03/22 0818  •  atorvastatin (LIPITOR) tablet 10 mg, 10 mg, Oral, Nightly, Twin Minor MD, 10 mg at 04/02/22 2040  •  carvedilol (COREG) tablet 3.125 mg, 3.125 mg, Oral, BID With Meals, Twin Minor MD, 3.125 mg at 04/03/22 0818  •  clopidogrel (PLAVIX) tablet 75 mg, 75 mg, Oral, Daily, Twin Minor MD, 75 mg at 04/03/22 0818  •  furosemide (LASIX) tablet 20 mg, 20 mg, Oral, BID, Twin Minor MD, 20 mg at 04/03/22 0818  •  guaiFENesin (MUCINEX) 12 hr tablet 600 mg, 600 mg, Oral, Q12H, Twin Minor MD, 600 mg at 04/03/22 0818  •  HYDROcodone-acetaminophen (NORCO) 5-325 MG per tablet 1 tablet, 1 tablet, Oral, Q6H PRN, Twin Minor MD, 1 tablet at 04/02/22 2043  •   hydroxychloroquine (PLAQUENIL) tablet 200 mg, 200 mg, Oral, Daily, Jonny Minor MD, 200 mg at 04/03/22 0818  •  ipratropium-albuterol (DUO-NEB) nebulizer solution 3 mL, 3 mL, Nebulization, Q4H PRN, Jonny Minor MD  •  levothyroxine (SYNTHROID, LEVOTHROID) tablet 88 mcg, 88 mcg, Oral, QAM, Jonny Minor MD, 88 mcg at 04/03/22 0627  •  losartan (COZAAR) tablet 100 mg, 100 mg, Oral, Q24H, Jonny Minor MD, 100 mg at 04/03/22 0818  •  pantoprazole (PROTONIX) EC tablet 40 mg, 40 mg, Oral, Q AM, Jonny Minor MD, 40 mg at 04/03/22 0627  •  PARoxetine (PAXIL) tablet 20 mg, 20 mg, Oral, Nightly, Jonny Minor MD, 20 mg at 04/02/22 2039  •  potassium chloride (K-DUR,KLOR-CON) ER tablet 20 mEq, 20 mEq, Oral, Daily, Kristin Heath, DNP, APRN, 20 mEq at 04/03/22 0906     ASSESSMENT  Acute hypoxic respiratory failure  New onset congestive heart failure  Hypertension  Hyperlipidemia  Hypothyroidism  Osteoarthritis  Rheumatoid arthritis  Degenerative disc disease  Depression  Gastroesophageal reflux disease    PLAN  CPM  Supplement oxygen  Nebulizer  IV diuresis  Replace potassium  2D echo official results pending  Pain management  Cardiology and pulmonary consult appreciated  Adjust home medications  Stress ulcer DVT prophylaxis  Supportive care  DNR  PT OT  Discussed with family and nursing staff  Follow closely further recommendation current hospital course    JONNY MINOR MD

## 2022-04-03 NOTE — PLAN OF CARE
Goal Outcome Evaluation:  Plan of Care Reviewed With: patient        Progress: improving     VSS. Remains on 2L nc. Given norco x1 for back pain. Purewick in place. No other issues at this time.

## 2022-04-03 NOTE — PROGRESS NOTES
"   LOS: 3 days   Patient Care Team:  Kelsey Prabhakar MD as PCP - General (Internal Medicine)      Chief Complaint:  Following for CHF      Interval History:   Echo done yesterday afternoon and results still pending.  Appears improved on p.o. diuretic.  Will add potassium supplement.           Objective   Vital Signs  Temp:  [97.8 °F (36.6 °C)-98 °F (36.7 °C)] 97.8 °F (36.6 °C)  Heart Rate:  [65-75] 65  Resp:  [16-17] 16  BP: (121-166)/(58-89) 130/74      Intake/Output Summary (Last 24 hours) at 4/3/2022 1436  Last data filed at 4/3/2022 1300  Gross per 24 hour   Intake 960 ml   Output 500 ml   Net 460 ml       Last Weight and Admission Weight        04/01/22  0104   Weight: 50.8 kg (111 lb 15.9 oz)     Flowsheet Rows    Flowsheet Row First Filed Value   Admission Height 154.9 cm (61\") Documented at 03/31/2022 1728   Admission Weight 50.8 kg (112 lb) Documented at 03/31/2022 1728              Physical Exam  Constitutional:       General: She is not in acute distress.  HENT:      Nose:      Comments: NC O2 in place  Cardiovascular:      Rate and Rhythm: Normal rate and regular rhythm.      Heart sounds: Normal heart sounds.   Pulmonary:      Effort: Pulmonary effort is normal. No respiratory distress.      Comments: Scattered crackles bilaterally to bases  Abdominal:      General: Bowel sounds are normal. There is no distension.      Palpations: Abdomen is soft.   Musculoskeletal:         General: No swelling or tenderness.      Cervical back: Neck supple.   Skin:     General: Skin is warm and dry.      Capillary Refill: Capillary refill takes less than 2 seconds.   Neurological:      Mental Status: She is alert and oriented to person, place, and time.             Results Review:      Results from last 7 days   Lab Units 04/03/22  0737 04/02/22  0540 03/31/22  1710   SODIUM mmol/L 139 139 137   POTASSIUM mmol/L 3.4* 3.7 4.1   CHLORIDE mmol/L 102 105 104   CO2 mmol/L 28.0 24.4 21.0*   BUN mg/dL 18 20 25*   CREATININE " mg/dL 0.84 0.94 0.96   GLUCOSE mg/dL 88 91 118*   CALCIUM mg/dL 8.3* 8.3* 9.1     Results from last 7 days   Lab Units 04/02/22  0540 03/31/22  1710   TROPONIN T ng/mL <0.010 0.015     Results from last 7 days   Lab Units 04/03/22  0737 04/02/22  0540 03/31/22  1710   WBC 10*3/mm3 8.16 8.24 11.17*   HEMOGLOBIN g/dL 10.8* 10.1* 11.5*   HEMATOCRIT % 33.6* 32.5* 35.4   PLATELETS 10*3/mm3 262 255 277     Results from last 7 days   Lab Units 03/31/22  1710   INR  1.04   APTT seconds 29.9     Results from last 7 days   Lab Units 04/02/22  0540   CHOLESTEROL mg/dL 106     Results from last 7 days   Lab Units 03/31/22  1710   MAGNESIUM mg/dL 2.0     Results from last 7 days   Lab Units 04/02/22  0540   CHOLESTEROL mg/dL 106   TRIGLYCERIDES mg/dL 68   HDL CHOL mg/dL 38*   LDL CHOL mg/dL 53               Medication Review:   aspirin, 81 mg, Oral, Daily  atorvastatin, 10 mg, Oral, Nightly  carvedilol, 3.125 mg, Oral, BID With Meals  clopidogrel, 75 mg, Oral, Daily  furosemide, 20 mg, Oral, BID  guaiFENesin, 600 mg, Oral, Q12H  hydroxychloroquine, 200 mg, Oral, Daily  levothyroxine, 88 mcg, Oral, QAM  losartan, 100 mg, Oral, Q24H  pantoprazole, 40 mg, Oral, Q AM  PARoxetine, 20 mg, Oral, Nightly  potassium chloride, 20 mEq, Oral, Daily             Assessment/Plan   1. CHF: Appears improved with p.o. Lasix.   Had echo, results pending.  Was possibly to get stress test prior to discharge per cardiology consult note however given current O2 requirements not sure she would tolerate this well at this time, will have primary cardiologist reevaluate in the a.m.  She continues to deny any chest pain or discomfort symptoms.  2. Hypertension: Losartan dose increased yesterday.  Continue to monitor.  3. Acute hypoxic respiratory failure: Pulmonology consulted by primary team.  To get high-resolution CT chest and outpatient PFTs through pulmonology.  4. Hyperlipidemia    -Add K+ supplementation.  Recheck AM BMP.   -Patient declines IV  diuretic dose.  She does not feel that she could possibly urinate any more.  -Reviewed patient case with Dr Astudillo.  ProBNP significantly improved with p.o. diuretic, will continue the same.  No edema on exam.  No significant fluid overload seen on chest x-ray or CT chest to explain current O2 requirements.              Thanks,    Kristin Heath, RANDOLPH, APRN  04/03/22  14:36 EDT

## 2022-04-03 NOTE — PROGRESS NOTES
[facility]     Mar/chart reviewed  Follow up AhRF  Says she is fine and she is ready to go home  Denies cough, no chest pain no shortness of breath  Vital Sign Min/Max for last 24 hours  Temp  Min: 97.8 °F (36.6 °C)  Max: 98 °F (36.7 °C)   BP  Min: 121/58  Max: 166/84   Pulse  Min: 65  Max: 75   Resp  Min: 16  Max: 17   SpO2  Min: 90 %  Max: 94 %   Flow (L/min)  Min: 2  Max: 3   No data recorded     Appears ill, frail, thin, alert oriented times 2, fairly appropirte but forgetful,   perrl, eomi, normal sclera,  mmm, no jvd, trachea midline, neck supple,  chest decreased ae bilaterally, + crackles, no wheezes,   rrr,   soft, nt, nd +bs,  no c/c/ e  Skin warm, dry no rashes    Labs: 4/3: Reviewed:  Glucose 88  Bun 18  Cr 0.84  Na 139  k 3.4  Bicarb 28  Wbc 8  hgb 10.8  plts 262      Assessment / Recommendations:  Acute Hypoxic respiratory failure   CKD  Hypothyroidism  Hypertension  Osteoarthritis  Rheumatoid arthritis  Depression  Diastolic HF   Former smoker  Abnormal ct chest     Recommend diuresis as tolerated, pulmonary toilet  At some point would get ct chest [place]. Would defer to outpatient setting     Patient is new to me today

## 2022-04-04 LAB
ANION GAP SERPL CALCULATED.3IONS-SCNC: 8.3 MMOL/L (ref 5–15)
BASOPHILS # BLD AUTO: 0.04 10*3/MM3 (ref 0–0.2)
BASOPHILS NFR BLD AUTO: 0.5 % (ref 0–1.5)
BUN SERPL-MCNC: 19 MG/DL (ref 8–23)
BUN/CREAT SERPL: 22.6 (ref 7–25)
CALCIUM SPEC-SCNC: 8.7 MG/DL (ref 8.6–10.5)
CHLORIDE SERPL-SCNC: 105 MMOL/L (ref 98–107)
CO2 SERPL-SCNC: 28.7 MMOL/L (ref 22–29)
CREAT SERPL-MCNC: 0.84 MG/DL (ref 0.57–1)
DEPRECATED RDW RBC AUTO: 41.1 FL (ref 37–54)
EGFRCR SERPLBLD CKD-EPI 2021: 67.4 ML/MIN/1.73
EOSINOPHIL # BLD AUTO: 0.53 10*3/MM3 (ref 0–0.4)
EOSINOPHIL NFR BLD AUTO: 6.2 % (ref 0.3–6.2)
ERYTHROCYTE [DISTWIDTH] IN BLOOD BY AUTOMATED COUNT: 13.5 % (ref 12.3–15.4)
GLUCOSE SERPL-MCNC: 94 MG/DL (ref 65–99)
HCT VFR BLD AUTO: 34.9 % (ref 34–46.6)
HGB BLD-MCNC: 10.9 G/DL (ref 12–15.9)
IMM GRANULOCYTES # BLD AUTO: 0.03 10*3/MM3 (ref 0–0.05)
IMM GRANULOCYTES NFR BLD AUTO: 0.4 % (ref 0–0.5)
LYMPHOCYTES # BLD AUTO: 1.53 10*3/MM3 (ref 0.7–3.1)
LYMPHOCYTES NFR BLD AUTO: 18 % (ref 19.6–45.3)
MCH RBC QN AUTO: 26.1 PG (ref 26.6–33)
MCHC RBC AUTO-ENTMCNC: 31.2 G/DL (ref 31.5–35.7)
MCV RBC AUTO: 83.7 FL (ref 79–97)
MONOCYTES # BLD AUTO: 1.26 10*3/MM3 (ref 0.1–0.9)
MONOCYTES NFR BLD AUTO: 14.8 % (ref 5–12)
NEUTROPHILS NFR BLD AUTO: 5.11 10*3/MM3 (ref 1.7–7)
NEUTROPHILS NFR BLD AUTO: 60.1 % (ref 42.7–76)
NRBC BLD AUTO-RTO: 0 /100 WBC (ref 0–0.2)
PLATELET # BLD AUTO: 297 10*3/MM3 (ref 140–450)
PMV BLD AUTO: 10.5 FL (ref 6–12)
POTASSIUM SERPL-SCNC: 4.1 MMOL/L (ref 3.5–5.2)
RBC # BLD AUTO: 4.17 10*6/MM3 (ref 3.77–5.28)
SODIUM SERPL-SCNC: 142 MMOL/L (ref 136–145)
WBC NRBC COR # BLD: 8.5 10*3/MM3 (ref 3.4–10.8)

## 2022-04-04 PROCEDURE — 85025 COMPLETE CBC W/AUTO DIFF WBC: CPT | Performed by: HOSPITALIST

## 2022-04-04 PROCEDURE — 99232 SBSQ HOSP IP/OBS MODERATE 35: CPT | Performed by: INTERNAL MEDICINE

## 2022-04-04 PROCEDURE — 97116 GAIT TRAINING THERAPY: CPT

## 2022-04-04 PROCEDURE — 80048 BASIC METABOLIC PNL TOTAL CA: CPT | Performed by: NURSE PRACTITIONER

## 2022-04-04 PROCEDURE — 97530 THERAPEUTIC ACTIVITIES: CPT

## 2022-04-04 RX ADMIN — FUROSEMIDE 20 MG: 20 TABLET ORAL at 09:44

## 2022-04-04 RX ADMIN — CLOPIDOGREL 75 MG: 75 TABLET, FILM COATED ORAL at 09:49

## 2022-04-04 RX ADMIN — POTASSIUM CHLORIDE 20 MEQ: 750 TABLET, EXTENDED RELEASE ORAL at 19:42

## 2022-04-04 RX ADMIN — HYDROXYCHLOROQUINE SULFATE 200 MG: 200 TABLET ORAL at 09:49

## 2022-04-04 RX ADMIN — GUAIFENESIN 600 MG: 600 TABLET, EXTENDED RELEASE ORAL at 09:50

## 2022-04-04 RX ADMIN — CARVEDILOL 3.12 MG: 3.12 TABLET, FILM COATED ORAL at 09:49

## 2022-04-04 RX ADMIN — PANTOPRAZOLE SODIUM 40 MG: 40 TABLET, DELAYED RELEASE ORAL at 06:03

## 2022-04-04 RX ADMIN — PAROXETINE HYDROCHLORIDE HEMIHYDRATE 20 MG: 20 TABLET, FILM COATED ORAL at 20:25

## 2022-04-04 RX ADMIN — FUROSEMIDE 20 MG: 20 TABLET ORAL at 19:42

## 2022-04-04 RX ADMIN — GUAIFENESIN 600 MG: 600 TABLET, EXTENDED RELEASE ORAL at 20:25

## 2022-04-04 RX ADMIN — POTASSIUM CHLORIDE 20 MEQ: 750 TABLET, EXTENDED RELEASE ORAL at 09:49

## 2022-04-04 RX ADMIN — ATORVASTATIN CALCIUM 10 MG: 20 TABLET, FILM COATED ORAL at 20:25

## 2022-04-04 RX ADMIN — CARVEDILOL 3.12 MG: 3.12 TABLET, FILM COATED ORAL at 19:42

## 2022-04-04 RX ADMIN — LEVOTHYROXINE SODIUM 88 MCG: 0.09 TABLET ORAL at 06:03

## 2022-04-04 RX ADMIN — LOSARTAN POTASSIUM 100 MG: 100 TABLET, FILM COATED ORAL at 09:49

## 2022-04-04 RX ADMIN — ASPIRIN 81 MG: 81 TABLET, CHEWABLE ORAL at 09:49

## 2022-04-04 NOTE — NURSING NOTE
"4/4/22    Upon entering room for assessment was met at the door by patient's step-son.  He had many questions about patients care.   ADAIR Salmeron, from cardiology came in the room during assessment and helped answers some of the questions he had in plan of care.  The questions concerning results of Echo were explained. Cardiac stress test will performed tomorrow morning.  Pt will need to be NPO at midnight and no caffeine.  After pt's step son left pt's nephew who is POA.  Was asked to come in the room by attending service.  At this time was asked why pt didn't have IS at bedside, and why pulmonary had not seen patient. There were notes that Dr. Christian did see patient yesterday and Attending messaged Dr. Christian via text and asked for him to speak with patient's nephew.  The text was shown to nephew as it was sent.  The nephew then asked \"why patient had not been out of bed in 3 days and why has physical therapy not worked with patient?\"  I once again stated that Physical therapy doesn't come everyday, and she was seen on worked with on Saturday.  This nurse placed patient on bedside commode and asked if she wanted to get up to chair and/or go for a walk.  Patient refused stating \"she would do it later\"  Pt is on currently on 2L of o2 trying to wean.  Addressed concerns from patient's nephew about the possibility of needed o2 at discharge.  Called physical therapy to make sure they were seeing patient today.  Went over how to use IS and patient was not able to follow directions to perform properly, will asked RT to get flutter valve. Gave nephew the number to patient relations in case he has further concerns.  Will continue to monitor.     Anna ackerman RN  "

## 2022-04-04 NOTE — SIGNIFICANT NOTE
04/04/22 1600   OTHER   Discipline occupational therapist   Rehab Time/Intention   Session Not Performed patient/family declined treatment   Recommendation   OT - Next Appointment 04/05/22

## 2022-04-04 NOTE — PROGRESS NOTES
"Daily progress note    Chief complaint  Doing same  No new complaints  Still with severe back pain  Denies chest pain shortness of breath palpitation  O2 sats 92% 2 L    History of present illness  87-year-old white female with history of osteoarthritis rheumatoid arthritis degenerative disease hypertension hyperlipidemia and gastroesophageal reflux disease who is well-known to our service presented to [facility] emergency room with severe back pain as she fell 3 weeks ago.  Patient denies any radiation of the pain.  Patient work-up in ER revealed hypoxia but she denies any chest pain shortness of breath palpitation.  Patient also denies any fever cough congestion night sweats weight loss or weight gain.  Patient admitted for management.    REVIEW OF SYSTEMS  Unremarkable except back pain     PHYSICAL EXAM  Blood pressure 162/73, pulse 67, temperature 98.2 °F (36.8 °C), temperature source Oral, resp. rate 17, height 154.9 cm (61\"), weight 47.6 kg (104 lb 15 oz), SpO2 92 %.    General: No acute distress.  HENT: NCAT, PERRL, Nares patent.  Eyes: no scleral icterus.  Neck: trachea midline, no ROM limitations.  CV: regular rhythm, regular rate.  Respiratory: normal effort, inspiratory crackles at bilateral lung bases.  Abdomen: soft, nondistended nontender bowel sounds positive  Musculoskeletal: no deformity.  Lumbar spine: No step-offs or deformities, no midline tenderness to palpation, bilateral paraspinal tenderness to palpation.  5 out of 5 strength with sensation intact to light touch bilateral lower extremities.  Neuro: alert, moves all extremities, follows commands.  Skin: warm, dry.  Discoloration of bilateral legs which patient states is chronic, no signs of infection, nonpitting edema bilateral lower extremities     LAB RESULTS  Lab Results (last 24 hours)     Procedure Component Value Units Date/Time    Basic Metabolic Panel [[id number]]  (Normal) Collected: 04/04/22 0745    Specimen: Blood Updated: " 04/04/22 0837     Glucose 94 mg/dL      BUN 19 mg/dL      Creatinine 0.84 mg/dL      Sodium 142 mmol/L      Potassium 4.1 mmol/L      Chloride 105 mmol/L      CO2 28.7 mmol/L      Calcium 8.7 mg/dL      BUN/Creatinine Ratio 22.6     Anion Gap 8.3 mmol/L      eGFR 67.4 mL/min/1.73      Comment: National Kidney Foundation and American Society of Nephrology (ASN) Task Force recommended calculation based on the Chronic Kidney Disease Epidemiology Collaboration (CKD-EPI) equation refit without adjustment for race.       Narrative:      GFR Normal >60  Chronic Kidney Disease <60  Kidney Failure <15      CBC & Differential [[id number]]  (Abnormal) Collected: 04/04/22 0745    Specimen: Blood Updated: 04/04/22 0814    Narrative:      The following orders were created for panel order CBC & Differential.  Procedure                               Abnormality         Status                     ---------                               -----------         ------                     CBC Auto Differential[[id number]]        Abnormal            Final result                 Please view results for these tests on the individual orders.    CBC Auto Differential [[id number]]  (Abnormal) Collected: 04/04/22 0745    Specimen: Blood Updated: 04/04/22 0814     WBC 8.50 10*3/mm3      RBC 4.17 10*6/mm3      Hemoglobin 10.9 g/dL      Hematocrit 34.9 %      MCV 83.7 fL      MCH 26.1 pg      MCHC 31.2 g/dL      RDW 13.5 %      RDW-SD 41.1 fl      MPV 10.5 fL      Platelets 297 10*3/mm3      Neutrophil % 60.1 %      Lymphocyte % 18.0 %      Monocyte % 14.8 %      Eosinophil % 6.2 %      Basophil % 0.5 %      Immature Grans % 0.4 %      Neutrophils, Absolute 5.11 10*3/mm3      Lymphocytes, Absolute 1.53 10*3/mm3      Monocytes, Absolute 1.26 10*3/mm3      Eosinophils, Absolute 0.53 10*3/mm3      Basophils, Absolute 0.04 10*3/mm3      Immature Grans, Absolute 0.03 10*3/mm3      nRBC 0.0 /100 WBC         Imaging Results (Last 24 Hours)     ** No results  found for the last 24 hours. **             ECG 12 Lead  Component   Ref Range & Units 3/31/22 1736 11/20/21 0628 11/18/21 1414   QT Interval   ms 463  403  415              HEART RATE= 71  bpm  RR Interval= 845  ms  MO Interval= 145  ms  P Horizontal Axis= -26  deg  P Front Axis= 68  deg  QRSD Interval= 134  ms  QT Interval= 463  ms  QRS Axis= -7  deg  T Wave Axis= 59  deg  - ABNORMAL ECG -  Sinus rhythm  Left atrial enlargement  Right bundle branch block  No change from previous tracing             Current Facility-Administered Medications:   •  aspirin chewable tablet 81 mg, 81 mg, Oral, Daily, Twin Minor MD, 81 mg at 04/04/22 0949  •  atorvastatin (LIPITOR) tablet 10 mg, 10 mg, Oral, Nightly, Twin Minor MD, 10 mg at 04/03/22 2008  •  carvedilol (COREG) tablet 3.125 mg, 3.125 mg, Oral, BID With Meals, Twin Minor MD, 3.125 mg at 04/04/22 0949  •  clopidogrel (PLAVIX) tablet 75 mg, 75 mg, Oral, Daily, Twin Minor MD, 75 mg at 04/04/22 0949  •  furosemide (LASIX) tablet 20 mg, 20 mg, Oral, BID, Twin Minor MD, 20 mg at 04/04/22 0944  •  guaiFENesin (MUCINEX) 12 hr tablet 600 mg, 600 mg, Oral, Q12H, Twin Minor MD, 600 mg at 04/04/22 0950  •  HYDROcodone-acetaminophen (NORCO) 5-325 MG per tablet 1 tablet, 1 tablet, Oral, Q6H PRN, Twin Minor MD, 1 tablet at 04/03/22 2205  •  hydroxychloroquine (PLAQUENIL) tablet 200 mg, 200 mg, Oral, Daily, Twin Minor MD, 200 mg at 04/04/22 0949  •  ipratropium-albuterol (DUO-NEB) nebulizer solution 3 mL, 3 mL, Nebulization, Q4H PRN, Twin Minor MD  •  levothyroxine (SYNTHROID, LEVOTHROID) tablet 88 mcg, 88 mcg, Oral, QAM, Twin Minor MD, 88 mcg at 04/04/22 0603  •  losartan (COZAAR) tablet 100 mg, 100 mg, Oral, Q24H, Twin Minor MD, 100 mg at 04/04/22 0949  •  pantoprazole (PROTONIX) EC tablet 40 mg, 40 mg, Oral, Q AM, Twin Minor MD, 40 mg at 04/04/22 0603  •  PARoxetine (PAXIL) tablet 20 mg, 20 mg, Oral, Nightly, Twin Minor MD, 20 mg at 04/03/22  2008  •  potassium chloride (K-DUR,KLOR-CON) ER tablet 20 mEq, 20 mEq, Oral, BID With Meals, Jonny Minor MD, 20 mEq at 04/04/22 0949     ASSESSMENT  Acute hypoxic respiratory failure  New onset congestive heart failure  Hypertension  Hyperlipidemia  Hypothyroidism  Osteoarthritis  Rheumatoid arthritis  Degenerative disc disease  Depression  Gastroesophageal reflux disease    PLAN  CPM  Supplement oxygen  Nebulizer and incentive spirometry  Diuresis  Replace potassium  Stress Cardiolite in a.m.  Pain management  Check MRI of the lumbar spine  Cardiology and pulmonary consult appreciated  Adjust home medications  Stress ulcer DVT prophylaxis  Supportive care  DNR  PT OT  Discussed with family nursing staff and pulmonary  Follow closely further recommendation current hospital course    JONNY MINOR MD

## 2022-04-04 NOTE — THERAPY TREATMENT NOTE
Patient Name: Verona Avila  : 1934    MRN: [id number]                              Today's Date: 2022       Admit Date: 3/31/2022    Visit Dx:     ICD-10-CM ICD-9-CM   1. Hypoxia  R09.02 799.02   2. Acute bilateral low back pain without sciatica  M54.50 724.2     338.19     Patient Active Problem List   Diagnosis   • Syncope   • Hypoxia     Past Medical History:   Diagnosis Date   • Arthritis    • Atherosclerosis    • Depression    • Difficulty walking    • Disease of thyroid gland    • Hypertension    • Muscle weakness    • Spinal stenosis      Past Surgical History:   Procedure Laterality Date   • THYROID SURGERY     • TONSILLECTOMY        General Information     Row Name 22 1441          Physical Therapy Time and Intention    Document Type therapy note (daily note)  -DJ     Mode of Treatment individual therapy;physical therapy  -DJ     Row Name 22 1441          General Information    Patient Profile Reviewed yes  -DJ     Existing Precautions/Restrictions fall;oxygen therapy device and L/min  2L  -DJ     Row Name 22 144          Cognition    Orientation Status (Cognition) oriented x 3  -DJ     Row Name 22 144          Safety Issues, Functional Mobility    Comment, Safety Issues/Impairments (Mobility) gt belt, nonskid socks  -DJ           User Key  (r) = Recorded By, (t) = Taken By, (c) = Cosigned By    Initials Name Provider Type    Komal Fair, PT Physical Therapist               Mobility     Row Name 22 144          Bed Mobility    Bed Mobility supine-sit;sit-supine  -DJ     Supine-Sit [place] (Bed Mobility) verbal cues;moderate assist (50% patient effort)  -DJ     Sit-Supine [place] (Bed Mobility) verbal cues;moderate assist (50% patient effort)  -DJ     Assistive Device (Bed Mobility) bed rails;head of bed elevated;draw sheet  -DJ     Comment, (Bed Mobility) dependent to reposition  -DJ     Row Name 22 144          Transfers    Comment,  (Transfers) sit/stand from EOB  -DJ     Row Name 04/04/22 1442          Bed-Chair Transfer    Bed-Chair [place] (Transfers) not tested  -DJ     Row Name 04/04/22 1442          Sit-Stand Transfer    Sit-Stand [place] (Transfers) minimum assist (75% patient effort);moderate assist (50% patient effort);verbal cues  -DJ     Assistive Device (Sit-Stand Transfers) walker, front-wheeled  -DJ     Row Name 04/04/22 1442          Gait/Stairs (Locomotion)    [place] Level (Gait) minimum assist (75% patient effort);verbal cues  -DJ     Assistive Device (Gait) walker, front-wheeled  -DJ     Distance in Feet (Gait) 7-10' from bed to/from 1st door  -DJ     Deviations/Abnormal Patterns (Gait) festinating/shuffling;gait speed decreased;stride length decreased  -DJ     Bilateral Gait Deviations forward flexed posture  -DJ     [place] Level (Stairs) not tested  -DJ     Comment, (Gait/Stairs) Pt amb 7-10' with r wx and min A; pt dem flexed posture, VERY SLOW pace, and VERY SMALL/shuffled steps. Balance is fair with r wx but pt lets go of r wx and reaches out to hold onto furniture while walking. Endurance is poor and limits further amb distance. Pt fatigues quickly and requests to return to bed. Pt c/o back pain with mobility and pain limits further activity  -DJ           User Key  (r) = Recorded By, (t) = Taken By, (c) = Cosigned By    Initials Name Provider Type    Komal Fair PT Physical Therapist               Obj/Interventions     Row Name 04/04/22 1446          Motor Skills    Therapeutic Exercise other (see comments)  AP  -DJ     Row Name 04/04/22 1446          Balance    Balance Assessment sitting static balance;standing static balance;standing dynamic balance  -DJ     Static Sitting Balance standby assist;verbal cues  -DJ     Position, Sitting Balance sitting edge of bed;unsupported  -DJ     Static Standing Balance minimal assist;verbal cues  -DJ     Dynamic Standing Balance minimal assist;verbal cues   -DJ     Position/Device Used, Standing Balance walker, rolling;supported  -DJ     Balance Interventions sitting;standing;sit to stand;supported;weight shifting activity  -DJ     Comment, Balance pt reaches for objects in room to hold onto which decreases her balance  -DJ           User Key  (r) = Recorded By, (t) = Taken By, (c) = Cosigned By    Initials Name Provider Type    Komal Fair, PT Physical Therapist               Goals/Plan    No documentation.                Clinical Impression     Row Name 04/04/22 1447          Pain    Pretreatment Pain Rating 7/10  -DJ     Pain Location - back  -DJ     Pain Intervention(s) Repositioned;Rest  -DJ     Row Name 04/04/22 1447          Plan of Care Review    Plan of Care Reviewed With patient  -DJ     Progress improving  -DJ     Outcome Evaluation Pt resting in bed in NAD, agrees to participate in PT. Pt req mod A for bed mobility - c/o back pain with any movement. Pt sat EOB x 3-4 min unsupported. Pt stood EOB with min A and r wx. Pt amb 7-10' with r wx and min A; pt dem flexed posture, VERY SLOW pace, and VERY SMALL/shuffled steps. Balance is fair with r wx but pt lets go of r wx and reaches out to hold onto furniture while walking. Endurance is poor and limits further amb distance. Pt fatigues quickly and requests to return to bed. Pt c/o back pain with mobility and pain limits further activity. Cont PT to address functional deficits and progress as tolerated. Again, back pain is a primary limitation to mobility. Pt may not be safe to return to her shelter due to her limited amb and required assist for bed mobility.  -DJ     Row Name 04/04/22 1447          Therapy Assessment/Plan (PT)    Patient/Family Therapy Goals Statement (PT) return to [facility]  -DJ     Criteria for Skilled Interventions Met (PT) skilled treatment is necessary  -DJ     Row Name 04/04/22 1447          Vital Signs    Pre SpO2 (%) 94  -DJ     O2 Delivery Pre Treatment supplemental O2  2L  -DJ     O2  Delivery Intra Treatment room air  -DJ     O2 Delivery Post Treatment supplemental O2  2L  -DJ     Pre Patient Position Supine  -DJ     Intra Patient Position Standing  -DJ     Post Patient Position Supine  -DJ     Row Name 04/04/22 1447          Positioning and Restraints    Pre-Treatment Position in bed  -DJ     Post Treatment Position bed  -DJ     In Bed notified nsg;supine;call light within reach;encouraged to call for assist  bed alarm not on upon PT entry  -DJ           User Key  (r) = Recorded By, (t) = Taken By, (c) = Cosigned By    Initials Name Provider Type    Komal Fair, PT Physical Therapist               Outcome Measures     Row Name 04/04/22 1452 04/04/22 0950       How much help from another person do you currently need...    Turning from your back to your side while in flat bed without using bedrails? 3  -DJ 3  -SB    Moving from lying on back to sitting on the side of a flat bed without bedrails? 3  -DJ 3  -SB    Moving to and from a bed to a chair (including a wheelchair)? 2  -DJ 2  -SB    Standing up from a chair using your arms (e.g., wheelchair, bedside chair)? 3  -DJ 2  -SB    Climbing 3-5 steps with a railing? 1  -DJ 2  -SB    To walk in hospital room? 2  -DJ 2  -SB    AM-PAC 6 Clicks Score (PT) 14  -DJ 14  -SB    Row Name 04/04/22 1452          Functional Assessment    Outcome Measure Options AM-PAC 6 Clicks Basic Mobility (PT)  -DJ           User Key  (r) = Recorded By, (t) = Taken By, (c) = Cosigned By    Initials Name Provider Type    Anna Noel, RN Registered Nurse    Komal Fair, PT Physical Therapist                             Physical Therapy Education                 Title: PT OT SLP Therapies (Done)     Topic: Physical Therapy (Done)     Point: Mobility training (Done)     Learning Progress Summary           Patient Acceptance, E, VU,NR by JOHNATHAN at 4/4/2022 1453    Acceptance, E, VU by TARAS at 4/4/2022 1448    Acceptance, E, VU,NR by SHERLYN at 4/2/2022 1632                    Point: Home exercise program (Done)     Learning Progress Summary           Patient Acceptance, E, VU,NR by DJ at 4/4/2022 1453    Acceptance, E, VU by SB at 4/4/2022 1448                   Point: Body mechanics (Done)     Learning Progress Summary           Patient Acceptance, E, VU,NR by DJ at 4/4/2022 1453    Acceptance, E, VU by SB at 4/4/2022 1448    Acceptance, E, VU,NR by KH at 4/2/2022 1632                   Point: Precautions (Done)     Learning Progress Summary           Patient Acceptance, E, VU,NR by DJ at 4/4/2022 1453    Acceptance, E, VU by SB at 4/4/2022 1448                               User Key     Initials Effective Dates Name Provider Type Discipline    SB 06/16/21 -  Anna Eldridge, RN Registered Nurse Nurse    JOHNATHAN 10/25/19 -  Komal Leblanc, PT Physical Therapist PT     11/03/21 -  Tatiana Madera, PT Physical Therapist PT              PT Recommendation and Plan     Plan of Care Reviewed With: patient  Progress: improving  Outcome Evaluation: Pt resting in bed in NAD, agrees to participate in PT. Pt req mod A for bed mobility - c/o back pain with any movement. Pt sat EOB x 3-4 min unsupported. Pt stood EOB with min A and r wx. Pt amb 7-10' with r wx and min A; pt dem flexed posture, VERY SLOW pace, and VERY SMALL/shuffled steps. Balance is fair with r wx but pt lets go of r wx and reaches out to hold onto furniture while walking. Endurance is poor and limits further amb distance. Pt fatigues quickly and requests to return to bed. Pt c/o back pain with mobility and pain limits further activity. Cont PT to address functional deficits and progress as tolerated. Again, back pain is a primary limitation to mobility. Pt may not be safe to return to her [facility] due to her limited amb and required assist for bed mobility.     Time Calculation:    PT Charges     Row Name 04/04/22 1535             Time Calculation    Start Time 1412  -DJ      Stop Time 1438  -DJ      Time Calculation (min) 26 min  -DJ       PT Non-Billable Time (min) 10 min  -JOHNATHAN      PT Received On 04/04/22  -JOHNATHAN      PT - Next Appointment 04/05/22  -JOHNATHAN            User Key  (r) = Recorded By, (t) = Taken By, (c) = Cosigned By    Initials Name Provider Type    Komal Fair, GREG Physical Therapist              Therapy Charges for Today     Code Description Service Date Service Provider Modifiers Qty    13730960450 HC PT THERAPEUTIC ACT EA 15 MIN 4/4/2022 Komal Leblanc, PT GP 1    15974519145 HC GAIT TRAINING EA 15 MIN 4/4/2022 Komal Leblnac, PT GP 1          PT G-Codes  Outcome Measure Options: AM-PAC 6 Clicks Basic Mobility (PT)  AM-PAC 6 Clicks Score (PT): 14    Komal Leblanc PT  4/4/2022

## 2022-04-04 NOTE — PROGRESS NOTES
"  Daily Progress Note.   [address]  4/4/2022    Patient:  Name:  Verona Avila  MRN:  [id number]  1934  87 y.o.  female         Requesting physician: Dr. Twin Minor     Reason for Consultation: Acute hypoxic respiratory failure     CC: Back pain     History of Present Illness:  \"Patient is a 87-year-old female with a previous medical history of smoking quitting 50 years ago smoked for about 20 years no prior pulmonary history.  No COPD asthma.  No known interstitial lung disease per her report.  Never needed oxygen before per report.  She is presented to the ER with worsening back pain.  She said this is quite severe for her.  She denies any hemoptysis cough sputum production fever chills.  Symptoms getting progressively worse.  She does not feel short of breath with conversation her biggest complaint is her back pain.     She has been admitted where her BNP was about 6000  CT scan angiogram shows no pulmonary embolism.  No significant signs of interstitial lung disease.  Very faint bibasilar reticulation/infiltrate suggestive of atelectasis or mild subpleural scarring.     Per chart review she does have a history of rheumatoid arthritis.  Unaware of any ILD  Of note she is on plaquenil.\"       Interval History:  Patient is awake alert.  No significant confusion.  Back pain is getting better.  She describes it as a light switch it went very severe limited her ability to walk take a deep breath.  She feels this is improving.  No significant cough no hemoptysis no high fever chills.  She does feel better since arrival to the hospital.      Physical Exam:  /73 (BP Location: Left arm, Patient Position: Lying)   Pulse 67   Temp 98.2 °F (36.8 °C) (Oral)   Resp 17   Ht 154.9 cm (61\")   Wt 47.6 kg (104 lb 15 oz)   SpO2 92%   BMI 19.83 kg/m²   Body mass index is 19.83 kg/m².    Intake/Output Summary (Last 24 hours) at 4/4/2022 1300  Last data filed at 4/3/2022 1700  Gross per 24 hour "   Intake 240 ml   Output 500 ml   Net -260 ml        General appearance: non toxic, conversant   Eyes: anicteric sclerae, moist conjunctivae; no lid-lag;    HENT: Atraumatic; oropharynx clear with moist mucous membranes    Neck: Trachea midline;   Lungs: bibasilar crackles fine still present, with normal respiratory effort and no intercostal retractions  CV: RRR, no rub cant hear loud murmurs either  Abdomen: Soft, non-rigid bs+  Extremities: No peripheral edemay +hip pain  Skin: warm dry chronic changes to ble   Psych: Appropriate affect, alert and oriented  Neuro: cns 2-12 grossly intact moves exts    Data Review:  Notable Labs:  Results from last 7 days   Lab Units 04/04/22  0745 04/03/22 0737 04/02/22 0540 03/31/22  1710   WBC 10*3/mm3 8.50 8.16 8.24 11.17*   HEMOGLOBIN g/dL 10.9* 10.8* 10.1* 11.5*   PLATELETS 10*3/mm3 297 262 255 277     Results from last 7 days   Lab Units 04/04/22  0745 04/03/22 0737 04/02/22 0540 03/31/22  1710   SODIUM mmol/L 142 139 139 137   POTASSIUM mmol/L 4.1 3.4* 3.7 4.1   CHLORIDE mmol/L 105 102 105 104   CO2 mmol/L 28.7 28.0 24.4 21.0*   BUN mg/dL 19 18 20 25*   CREATININE mg/dL 0.84 0.84 0.94 0.96   GLUCOSE mg/dL 94 88 91 118*   CALCIUM mg/dL 8.7 8.3* 8.3* 9.1   MAGNESIUM mg/dL  --   --   --  2.0   Estimated Creatinine Clearance: 35.5 mL/min (by C-G formula based on SCr of 0.84 mg/dL).    Results from last 7 days   Lab Units 04/04/22  0745 04/03/22 0737 04/02/22 0540 03/31/22  1710   AST (SGOT) U/L  --   --  25 34*   ALT (SGPT) U/L  --   --  17 23   PROCALCITONIN ng/mL  --   --   --  0.09   PLATELETS 10*3/mm3 297 262 255 277             Imaging:  Reviewed chest images personally from past 3 days    Scheduled meds:    aspirin, 81 mg, Oral, Daily  atorvastatin, 10 mg, Oral, Nightly  carvedilol, 3.125 mg, Oral, BID With Meals  clopidogrel, 75 mg, Oral, Daily  furosemide, 20 mg, Oral, BID  guaiFENesin, 600 mg, Oral, Q12H  hydroxychloroquine, 200 mg, Oral, Daily  levothyroxine, 88  mcg, Oral, QAM  losartan, 100 mg, Oral, Q24H  pantoprazole, 40 mg, Oral, Q AM  PARoxetine, 20 mg, Oral, Nightly  potassium chloride, 20 mEq, Oral, BID With Meals        ASSESSMENT  /  PLAN:  Acute Hypoxic respiratory failure   CKD  Hypothyroidism  Hypertension  Osteoarthritis  Rheumatoid arthritis  Depression  Diastolic HF   Former smoker  Abnormal ct chest    She has fine crackles at bases of both lungs, abnormal ct chest and bnp elevated.  She doesn't have sig ggo.  I suspect that she may have slight pulm vascular congestion, she is on lasix for this and cards evaluating, atelectasis secondary to her acute back pain and there is a question if she has some baseline CT ILD from her RA.  This doesn't appear to be flaring however.  She will need a high res ct chest with insp exp proning when she is able to do this from a back pain perspective and she will need outpt pfts.  In mean time would cont cardiology evaluation.    Symptoms improved with above  Cont card eval  outpt pfts  Can get high res ct insp exp proning but if she is limited in ability to take deep break by her back pain then would want to wait until test can be most helpful  Continued investigations into her back pain discussed with Dr. Minor, he will see about potential MRI back    Note some emphysematous changes on scan - rec outpt pfts for this reason as well but she is not with ae and stopped smoking 30 years ago, dont believe this is causing acute symptoms.    Update her nephew Denver at bedside at her request.  All questions answered.  Appreciate his further insight into his Aunts care.    Coordinated care with Dr Minor.        Ronnie Christian MD  [facility]  04/04/22  1506EDT

## 2022-04-04 NOTE — PLAN OF CARE
Alert, vitals stable. 2L NC. Bed alarm. Reposition self. Purewick. Concerned about BM, sticky note to MD about possible medication addition. Norco x 1 back pain.     Problem: Adult Inpatient Plan of Care  Goal: Absence of Hospital-Acquired Illness or Injury  Intervention: Identify and Manage Fall Risk  Recent Flowsheet Documentation  Taken 4/4/2022 0220 by Rashmi Darling RN  Safety Promotion/Fall Prevention: safety round/check completed  Taken 4/4/2022 0020 by Rashmi Darling RN  Safety Promotion/Fall Prevention: safety round/check completed  Taken 4/3/2022 2205 by Rashmi Darling RN  Safety Promotion/Fall Prevention: safety round/check completed  Taken 4/3/2022 1949 by Rashmi Darling, RN  Safety Promotion/Fall Prevention: safety round/check completed   Goal Outcome Evaluation:

## 2022-04-04 NOTE — PROGRESS NOTES
"[facility]  Cardiology Progress Note    Patient Identification:  Name: Verona Avila  Age: 87 y.o.  Sex: female  :  1934  MRN: [id number]                 Follow Up / Chief Complaint: Follow up for CHF    Interval History: Pulm. following and recommend a CT in the outpatient setting.       Subjective: Shortness of breath has improved with Lasix.  On 3 L nasal cannula      Objective:    Past Medical History:  Past Medical History:   Diagnosis Date   • Arthritis    • Atherosclerosis    • Depression    • Difficulty walking    • Disease of thyroid gland    • Hypertension    • Muscle weakness    • Spinal stenosis      Past Surgical History:  Past Surgical History:   Procedure Laterality Date   • THYROID SURGERY     • TONSILLECTOMY          Social History:   Social History     Tobacco Use   • Smoking status: Former Smoker   • Smokeless tobacco: Never Used   Substance Use Topics   • Alcohol use: No      Family History:  History reviewed. No pertinent family history.       Allergies:  No Known Allergies  Scheduled Meds:  aspirin, 81 mg, Daily  atorvastatin, 10 mg, Nightly  carvedilol, 3.125 mg, BID With Meals  clopidogrel, 75 mg, Daily  furosemide, 20 mg, BID  guaiFENesin, 600 mg, Q12H  hydroxychloroquine, 200 mg, Daily  levothyroxine, 88 mcg, QAM  losartan, 100 mg, Q24H  pantoprazole, 40 mg, Q AM  PARoxetine, 20 mg, Nightly  potassium chloride, 20 mEq, BID With Meals            INTAKE AND OUTPUT:    Intake/Output Summary (Last 24 hours) at 2022 0903  Last data filed at 4/3/2022 1700  Gross per 24 hour   Intake 480 ml   Output 500 ml   Net -20 ml       ROS  Constitutional: Awake and alert, no fever. No nosebleeds  Abdomen           no abdominal pain   Cardiac              no chest pain  Pulmonary          no shortness of breath      /73 (BP Location: Left arm, Patient Position: Lying)   Pulse 67   Temp 98.2 °F (36.8 °C) (Oral)   Resp 17   Ht 154.9 cm (61\")   Wt 47.6 kg (104 lb 15 oz)   " SpO2 92%   BMI 19.83 kg/m²   General appearance: No acute changes   Neck: Trachea midline; NECK, supple, no thyromegaly or lymphadenopathy   Lungs: Normal size and shape, normal breath sounds, equal distribution of air, no rales or rhonchi   CV: S1-S2 regular, no murmurs, no rub, no gallop   Abdomen: Soft, nontender; no masses , no abnormal abdominal sounds   Extremities: No deformity , normal color , no peripheral edema   Skin: Normal temperature, turgor and texture; no rash, ulcers             I reviewed the patient's new clinical results, and personally reviewed and interpreted the patient's ECG and telemetry data from the last 24 hours        Cardiographics  Telemetry:   SR        ECG:   SR with RBBB        Echocardiogram:   Interpretation Summary    · Estimated right ventricular systolic pressure from tricuspid regurgitation is normal (<35 mmHg).  · Calculated left ventricular EF = 58.9% Estimated left ventricular EF was in agreement with the calculated left ventricular EF.  · Left ventricular diastolic function was normal.  · There is no evidence of pericardial effusion.      holter 2021  Interpretation Summary    · A normal monitor study.  · NSR WITH RARE PACS, AND NSSVT           Lab Review   Results from last 7 days   Lab Units 04/02/22  0540 03/31/22  1710   TROPONIN T ng/mL <0.010 0.015     Results from last 7 days   Lab Units 03/31/22  1710   MAGNESIUM mg/dL 2.0     Results from last 7 days   Lab Units 04/04/22  0745   SODIUM mmol/L 142   POTASSIUM mmol/L 4.1   BUN mg/dL 19   CREATININE mg/dL 0.84   CALCIUM mg/dL 8.7        Results from last 7 days   Lab Units 04/04/22  0745 04/03/22  0737 04/02/22  0540   WBC 10*3/mm3 8.50 8.16 8.24   HEMOGLOBIN g/dL 10.9* 10.8* 10.1*   HEMATOCRIT % 34.9 33.6* 32.5*   PLATELETS 10*3/mm3 297 262 255     Results from last 7 days   Lab Units 03/31/22  1710   INR  1.04   APTT seconds 29.9     The following medical decision was discussed in detail with   "Natalia      Assessment:     Acute hypoxic respiratory failure  New onset CHF  Hypertension  Hyperlipidemia         Plan:  Blood pressure elevated prior to medication.  Patient adjustments yesterday.  Her echo revealed normal LV function and EF 58%, see full report as above.  Euvolemic on exam.      It was explained to the patient that stress testing carries 85% specificity/sensitivity, and does not rule out future cardiac event.  Risks of the procedure were explained to the patient including shortness of breath, induction of myocardial infarction, and dizziness.  Patient is agreeable to proceeding with stress testing.       Labs/tests ordered for am: N.p.o. after midnight, no caffeine, labs, stress test tomorrow.    )4/4/2022  ADAIR Salmeron    Patient personally interviewed and above subjective findings personally confirmed during a face to face contact with patient today  All findings of physical examination confirmed  All pertinent and performed labs, cardiac procedures ,  radiographs of the last 24 hours personally reviewed  Impression and plans discussed/elaborated and implemented jointly as described above     MD CHANDAN Rodriguez/Transcription:   \"Dictated utilizing Dragon dictation\".     "

## 2022-04-04 NOTE — PLAN OF CARE
Goal Outcome Evaluation:  Plan of Care Reviewed With: patient        Progress: improving  Outcome Evaluation: Pt resting in bed in NAD, agrees to participate in PT. Pt req mod A for bed mobility - c/o back pain with any movement. Pt sat EOB x 3-4 min unsupported. Pt stood EOB with min A and r wx. Pt amb 7-10' with r wx and min A; pt dem flexed posture, VERY SLOW pace, and VERY SMALL/shuffled steps. Balance is fair with r wx but pt lets go of r wx and reaches out to hold onto furniture while walking. Endurance is poor and limits further amb distance. Pt fatigues quickly and requests to return to bed. Pt c/o back pain with mobility and pain limits further activity. Cont PT to address functional deficits and progress as tolerated. Again, back pain is a primary limitation to mobility. Pt may not be safe to return to her GERBER due to her limited amb and required assist for bed mobility.  Patient was intermittently wearing a face mask during this therapy encounter. Therapist used appropriate personal protective equipment including mask and gloves.  Mask used was standard procedure mask. Appropriate PPE was worn during the entire therapy session. Hand hygiene was completed before and after therapy session. Patient is not in enhanced droplet precautions.

## 2022-04-05 ENCOUNTER — APPOINTMENT (OUTPATIENT)
Dept: MRI IMAGING | Facility: HOSPITAL | Age: 87
End: 2022-04-05

## 2022-04-05 ENCOUNTER — APPOINTMENT (OUTPATIENT)
Dept: NUCLEAR MEDICINE | Facility: HOSPITAL | Age: 87
End: 2022-04-05

## 2022-04-05 PROBLEM — G89.29 CHRONIC BACK PAIN: Status: ACTIVE | Noted: 2022-04-05

## 2022-04-05 PROBLEM — M54.9 CHRONIC BACK PAIN: Status: ACTIVE | Noted: 2022-04-05

## 2022-04-05 PROBLEM — M51.26 PROTRUSION OF LUMBAR INTERVERTEBRAL DISC: Status: ACTIVE | Noted: 2022-04-05

## 2022-04-05 LAB
ANION GAP SERPL CALCULATED.3IONS-SCNC: 6.3 MMOL/L (ref 5–15)
BASOPHILS # BLD AUTO: 0.04 10*3/MM3 (ref 0–0.2)
BASOPHILS NFR BLD AUTO: 0.4 % (ref 0–1.5)
BH CV REST NUCLEAR ISOTOPE DOSE: 10 MCI
BH CV STRESS COMMENTS STAGE 1: NORMAL
BH CV STRESS DOSE REGADENOSON STAGE 1: 0.4
BH CV STRESS DURATION MIN STAGE 1: 0
BH CV STRESS DURATION SEC STAGE 1: 10
BH CV STRESS NUCLEAR ISOTOPE DOSE: 27.3 MCI
BH CV STRESS PROTOCOL 1: NORMAL
BH CV STRESS RECOVERY BP: NORMAL MMHG
BH CV STRESS RECOVERY HR: 81 BPM
BH CV STRESS STAGE 1: 1
BUN SERPL-MCNC: 18 MG/DL (ref 8–23)
BUN/CREAT SERPL: 20.7 (ref 7–25)
CALCIUM SPEC-SCNC: 9.3 MG/DL (ref 8.6–10.5)
CHLORIDE SERPL-SCNC: 101 MMOL/L (ref 98–107)
CO2 SERPL-SCNC: 31.7 MMOL/L (ref 22–29)
CREAT SERPL-MCNC: 0.87 MG/DL (ref 0.57–1)
DEPRECATED RDW RBC AUTO: 41.2 FL (ref 37–54)
EGFRCR SERPLBLD CKD-EPI 2021: 64.6 ML/MIN/1.73
EOSINOPHIL # BLD AUTO: 0.46 10*3/MM3 (ref 0–0.4)
EOSINOPHIL NFR BLD AUTO: 4.8 % (ref 0.3–6.2)
ERYTHROCYTE [DISTWIDTH] IN BLOOD BY AUTOMATED COUNT: 13.6 % (ref 12.3–15.4)
GLUCOSE SERPL-MCNC: 103 MG/DL (ref 65–99)
HCT VFR BLD AUTO: 39.5 % (ref 34–46.6)
HGB BLD-MCNC: 12.4 G/DL (ref 12–15.9)
IMM GRANULOCYTES # BLD AUTO: 0.04 10*3/MM3 (ref 0–0.05)
IMM GRANULOCYTES NFR BLD AUTO: 0.4 % (ref 0–0.5)
LV EF NUC BP: 64 %
LYMPHOCYTES # BLD AUTO: 2.15 10*3/MM3 (ref 0.7–3.1)
LYMPHOCYTES NFR BLD AUTO: 22.3 % (ref 19.6–45.3)
MAXIMAL PREDICTED HEART RATE: 133 BPM
MCH RBC QN AUTO: 26.5 PG (ref 26.6–33)
MCHC RBC AUTO-ENTMCNC: 31.4 G/DL (ref 31.5–35.7)
MCV RBC AUTO: 84.4 FL (ref 79–97)
MONOCYTES # BLD AUTO: 1.31 10*3/MM3 (ref 0.1–0.9)
MONOCYTES NFR BLD AUTO: 13.6 % (ref 5–12)
NEUTROPHILS NFR BLD AUTO: 5.64 10*3/MM3 (ref 1.7–7)
NEUTROPHILS NFR BLD AUTO: 58.5 % (ref 42.7–76)
NRBC BLD AUTO-RTO: 0 /100 WBC (ref 0–0.2)
PERCENT MAX PREDICTED HR: 69.92 %
PLATELET # BLD AUTO: 322 10*3/MM3 (ref 140–450)
PMV BLD AUTO: 10.6 FL (ref 6–12)
POTASSIUM SERPL-SCNC: 4.3 MMOL/L (ref 3.5–5.2)
RBC # BLD AUTO: 4.68 10*6/MM3 (ref 3.77–5.28)
SODIUM SERPL-SCNC: 139 MMOL/L (ref 136–145)
STRESS BASELINE BP: NORMAL MMHG
STRESS BASELINE HR: 63 BPM
STRESS PERCENT HR: 82 %
STRESS POST PEAK BP: NORMAL MMHG
STRESS POST PEAK HR: 93 BPM
STRESS TARGET HR: 113 BPM
WBC NRBC COR # BLD: 9.64 10*3/MM3 (ref 3.4–10.8)

## 2022-04-05 PROCEDURE — 25010000002 REGADENOSON 0.4 MG/5ML SOLUTION: Performed by: HOSPITALIST

## 2022-04-05 PROCEDURE — 25010000002 HYDRALAZINE PER 20 MG: Performed by: INTERNAL MEDICINE

## 2022-04-05 PROCEDURE — 72148 MRI LUMBAR SPINE W/O DYE: CPT

## 2022-04-05 PROCEDURE — A9500 TC99M SESTAMIBI: HCPCS | Performed by: HOSPITALIST

## 2022-04-05 PROCEDURE — 93017 CV STRESS TEST TRACING ONLY: CPT

## 2022-04-05 PROCEDURE — 97530 THERAPEUTIC ACTIVITIES: CPT

## 2022-04-05 PROCEDURE — 80048 BASIC METABOLIC PNL TOTAL CA: CPT | Performed by: NURSE PRACTITIONER

## 2022-04-05 PROCEDURE — 93018 CV STRESS TEST I&R ONLY: CPT | Performed by: INTERNAL MEDICINE

## 2022-04-05 PROCEDURE — 99221 1ST HOSP IP/OBS SF/LOW 40: CPT | Performed by: NURSE PRACTITIONER

## 2022-04-05 PROCEDURE — 78452 HT MUSCLE IMAGE SPECT MULT: CPT | Performed by: INTERNAL MEDICINE

## 2022-04-05 PROCEDURE — 85025 COMPLETE CBC W/AUTO DIFF WBC: CPT | Performed by: NURSE PRACTITIONER

## 2022-04-05 PROCEDURE — 25010000002 DEXAMETHASONE PER 1 MG: Performed by: NURSE PRACTITIONER

## 2022-04-05 PROCEDURE — 0 TECHNETIUM SESTAMIBI: Performed by: HOSPITALIST

## 2022-04-05 PROCEDURE — 78452 HT MUSCLE IMAGE SPECT MULT: CPT

## 2022-04-05 RX ORDER — DEXAMETHASONE SODIUM PHOSPHATE 4 MG/ML
4 INJECTION, SOLUTION INTRA-ARTICULAR; INTRALESIONAL; INTRAMUSCULAR; INTRAVENOUS; SOFT TISSUE EVERY 6 HOURS
Status: DISCONTINUED | OUTPATIENT
Start: 2022-04-05 | End: 2022-04-06

## 2022-04-05 RX ORDER — POTASSIUM CHLORIDE 1.5 G/1.77G
20 POWDER, FOR SOLUTION ORAL 2 TIMES DAILY WITH MEALS
Status: DISCONTINUED | OUTPATIENT
Start: 2022-04-05 | End: 2022-04-06 | Stop reason: HOSPADM

## 2022-04-05 RX ORDER — HYDRALAZINE HYDROCHLORIDE 20 MG/ML
10 INJECTION INTRAMUSCULAR; INTRAVENOUS EVERY 6 HOURS PRN
Status: DISCONTINUED | OUTPATIENT
Start: 2022-04-05 | End: 2022-04-05

## 2022-04-05 RX ORDER — METHOCARBAMOL 500 MG/1
500 TABLET, FILM COATED ORAL EVERY 6 HOURS PRN
Status: DISCONTINUED | OUTPATIENT
Start: 2022-04-05 | End: 2022-04-06

## 2022-04-05 RX ORDER — LOSARTAN POTASSIUM 50 MG/1
50 TABLET ORAL
Status: DISCONTINUED | OUTPATIENT
Start: 2022-04-06 | End: 2022-04-06 | Stop reason: HOSPADM

## 2022-04-05 RX ORDER — LIDOCAINE 50 MG/G
2 PATCH TOPICAL
Status: DISCONTINUED | OUTPATIENT
Start: 2022-04-05 | End: 2022-04-06

## 2022-04-05 RX ADMIN — FUROSEMIDE 20 MG: 20 TABLET ORAL at 13:46

## 2022-04-05 RX ADMIN — ATORVASTATIN CALCIUM 10 MG: 20 TABLET, FILM COATED ORAL at 21:37

## 2022-04-05 RX ADMIN — LIDOCAINE 2 PATCH: 50 PATCH TOPICAL at 21:40

## 2022-04-05 RX ADMIN — LOSARTAN POTASSIUM 100 MG: 100 TABLET, FILM COATED ORAL at 08:08

## 2022-04-05 RX ADMIN — LEVOTHYROXINE SODIUM 88 MCG: 0.09 TABLET ORAL at 06:29

## 2022-04-05 RX ADMIN — CARVEDILOL 3.12 MG: 3.12 TABLET, FILM COATED ORAL at 18:13

## 2022-04-05 RX ADMIN — HYDROCODONE BITARTRATE AND ACETAMINOPHEN 1 TABLET: 5; 325 TABLET ORAL at 08:07

## 2022-04-05 RX ADMIN — HYDROXYCHLOROQUINE SULFATE 200 MG: 200 TABLET ORAL at 08:08

## 2022-04-05 RX ADMIN — POTASSIUM CHLORIDE 20 MEQ: 1.5 POWDER, FOR SOLUTION ORAL at 13:47

## 2022-04-05 RX ADMIN — FUROSEMIDE 20 MG: 20 TABLET ORAL at 18:13

## 2022-04-05 RX ADMIN — ASPIRIN 81 MG: 81 TABLET, CHEWABLE ORAL at 08:08

## 2022-04-05 RX ADMIN — TECHNETIUM TC 99M SESTAMIBI 1 DOSE: 1 INJECTION INTRAVENOUS at 06:55

## 2022-04-05 RX ADMIN — GUAIFENESIN 600 MG: 600 TABLET, EXTENDED RELEASE ORAL at 08:08

## 2022-04-05 RX ADMIN — REGADENOSON 0.4 MG: 0.08 INJECTION, SOLUTION INTRAVENOUS at 10:40

## 2022-04-05 RX ADMIN — CLOPIDOGREL 75 MG: 75 TABLET, FILM COATED ORAL at 08:08

## 2022-04-05 RX ADMIN — PAROXETINE HYDROCHLORIDE HEMIHYDRATE 20 MG: 20 TABLET, FILM COATED ORAL at 21:37

## 2022-04-05 RX ADMIN — TECHNETIUM TC 99M SESTAMIBI 1 DOSE: 1 INJECTION INTRAVENOUS at 10:40

## 2022-04-05 RX ADMIN — HYDRALAZINE HYDROCHLORIDE 10 MG: 20 INJECTION INTRAMUSCULAR; INTRAVENOUS at 08:08

## 2022-04-05 RX ADMIN — DEXAMETHASONE SODIUM PHOSPHATE 4 MG: 4 INJECTION, SOLUTION INTRAMUSCULAR; INTRAVENOUS at 18:14

## 2022-04-05 RX ADMIN — PANTOPRAZOLE SODIUM 40 MG: 40 TABLET, DELAYED RELEASE ORAL at 06:29

## 2022-04-05 RX ADMIN — HYDROCODONE BITARTRATE AND ACETAMINOPHEN 1 TABLET: 5; 325 TABLET ORAL at 13:47

## 2022-04-05 RX ADMIN — CARVEDILOL 3.12 MG: 3.12 TABLET, FILM COATED ORAL at 08:08

## 2022-04-05 RX ADMIN — GUAIFENESIN 600 MG: 600 TABLET, EXTENDED RELEASE ORAL at 21:37

## 2022-04-05 NOTE — CONSULTS
[facility], [place], operated by [facility] NEUROSURGERY CONSULT NOTE    Patient name: Verona Avila  Referring Provider:  Dr. Minor  Reason for Consultation:     Patient Care Team:  Kelsey Prabhakar MD as PCP - General (Internal Medicine)    Chief complaint: Back pain    Subjective .     History of present illness:    Patient is a 87 y.o. chronically-ill, frail appearing female with a PMH significant for osteoarthritis, CKD, hypothyroidism, RA, depression, HTN, spinal stenosis, and atherosclerosis who presented to [facility] ED via EMS from the [place] [facility] with complaints of progressive worsening low back pain, difficulty ambulating, and cough.  Patient is somewhat of a poor historian, but reports that she has had chronic back pain for an extended period of time, however unable to verbalize timeframe.  She states that it has been gradually progressing.  She recently had a fall approximately 4 to 5 weeks ago landing on her buttock with only minimal increase in back pain.  She denies any lower extremity weakness, pain, numbness, or tingling.  She states that the back pain she experiences goes across her low back but does not radiate.  She denies any bowel or bladder incontinence or saddle anesthesia.  Of note, the patient's BNP was noted to be greater than 4000 on admission, however CT angio and lower extremity Dopplers were negative for both PE and DVT.  Lumbar spine x-ray imaging demonstrated moderately extensive multilevel degenerative disc changes with slight progression since imaging from February 2019.    Review of Systems  Review of Systems   Constitutional: Positive for activity change.   HENT: Negative.    Eyes: Negative.    Respiratory: Positive for cough and shortness of breath. Negative for chest tightness.    Cardiovascular: Positive for leg swelling. Negative for chest pain.   Gastrointestinal: Negative.    Endocrine: Negative.    Genitourinary: Negative.    Musculoskeletal: Positive for arthralgias, back pain, gait  problem and joint swelling.   Skin: Negative.    Allergic/Immunologic: Negative.    Neurological: Negative for weakness and numbness.   Hematological: Negative.    Psychiatric/Behavioral: Negative.        History  PAST MEDICAL HISTORY  Past Medical History:   Diagnosis Date   • Arthritis    • Atherosclerosis    • Depression    • Difficulty walking    • Disease of thyroid gland    • Hypertension    • Muscle weakness    • Spinal stenosis        PAST SURGICAL HISTORY  Past Surgical History:   Procedure Laterality Date   • THYROID SURGERY     • TONSILLECTOMY         FAMILY HISTORY  History reviewed. No pertinent family history.    SOCIAL HISTORY  Social History     Tobacco Use   • Smoking status: Former Smoker   • Smokeless tobacco: Never Used   Substance Use Topics   • Alcohol use: No   • Drug use: No       Retired from [employer]  Current resident at [facility]    Allergies:  Patient has no known allergies.    MEDICATIONS:  Medications Prior to Admission   Medication Sig Dispense Refill Last Dose   • aspirin 81 MG chewable tablet Chew 1 tablet Daily. 30 tablet 0    • atorvastatin (LIPITOR) 20 MG tablet Take 20 mg by mouth daily.      • carvedilol (COREG) 3.125 MG tablet Take 1 tablet by mouth 2 (Two) Times a Day With Meals. 60 tablet 0    • clopidogrel (PLAVIX) 75 MG tablet Take 1 tablet by mouth Daily. 30 tablet 0    • HYDROcodone-acetaminophen (NORCO) 5-325 MG per tablet Take 1/2 to 1 tablet q6h as needed for pain. (Patient taking differently: 1 tablet Every 4 (Four) Hours As Needed. Take 1/2 to 1 tablet q6h as needed for pain.) 12 tablet 0    • hydroxychloroquine (PLAQUENIL) 200 MG tablet Take  by mouth daily.      • losartan (COZAAR) 25 MG tablet Take 1 tablet by mouth Daily. 30 tablet 0    • pantoprazole (PROTONIX) 40 MG EC tablet Take 1 tablet by mouth Every Morning. 30 tablet 0    • PARoxetine (PAXIL) 20 MG tablet Take 20 mg by mouth Every Night.      • acetaminophen (TYLENOL) 650 MG 8  hr tablet Take 650 mg by mouth Every 6 (Six) Hours As Needed for Mild Pain .      • levothyroxine (SYNTHROID, LEVOTHROID) 88 MCG tablet Take 88 mcg by mouth Daily.          Results Review:  LABS:  Objective:  Results from last 7 days   Lab Units 04/05/22  0624 04/04/22  0745 04/03/22  0737   WBC 10*3/mm3 9.64 8.50 8.16   HEMOGLOBIN g/dL 12.4 10.9* 10.8*   HEMATOCRIT % 39.5 34.9 33.6*   PLATELETS 10*3/mm3 322 297 262     Results from last 7 days   Lab Units 04/05/22  0624 04/04/22  0745 04/03/22  0737 04/02/22  0540 03/31/22  1710   SODIUM mmol/L 139 142 139 139 137   POTASSIUM mmol/L 4.3 4.1 3.4* 3.7 4.1   CHLORIDE mmol/L 101 105 102 105 104   CO2 mmol/L 31.7* 28.7 28.0 24.4 21.0*   BUN mg/dL 18 19 18 20 25*   CREATININE mg/dL 0.87 0.84 0.84 0.94 0.96   CALCIUM mg/dL 9.3 8.7 8.3* 8.3* 9.1   BILIRUBIN mg/dL  --   --   --  0.2 0.4   ALK PHOS U/L  --   --   --  116 154*   ALT (SGPT) U/L  --   --   --  17 23   AST (SGOT) U/L  --   --   --  25 34*   GLUCOSE mg/dL 103* 94 88 91 118*      Latest Reference Range & Units 03/31/22 17:10 04/03/22 07:37   ProBNP 0.0 - 1,800.0 pg/mL 4,260.0 (H) 1,835.0 (H)   (H): Data is abnormally high      DIAGNOSTICS:  LUMBAR SPINE MRI W/O CONTRAST 4/5/22-  Since prior lumbar spine MRI from [facility] on 08/24/2020 the patient has developed vertical hairline nondisplaced bilateral sacral alar fractures from S1 to S4 and a horizontal nondisplaced fracture through the superior body of the S3 sacral level and there is exuberant marrow edema tracking into the right and left sacral ala from S1 to S4 and horizontally across the bodies at S2-3 compatible with an acute to subacute H-shaped sacral fracture. There appear to be possible additional nondisplaced fractures of the lateral aspect of the right and left transverse processes of L5.    The patient has a dextroscoliotic curvature of the lumbar spine with its apex at the L2-3 lumbar level and lumbar spondylosis that is pretty stable  since prior MRI of the lumbar spine 08/24/2020. There is mild narrowing of the left side of the canal at L2-3. At L3-4 there is mild right and mild-to-moderate left facet overgrowth and a 4 mm retrolisthesis of L3 with respect to L4 with uncovered bulging disc material extending diffusely along the posterior superior endplate of L4 and eccentrically protruding along the left posterior superior endplate  of L4 where it abuts the ventral aspect of the traversing left L4 nerve root and there is severe narrowing of the thecal sac at L3-4 with diminished spinal fluid bathing the cauda equina at this level. There is spurring and bulging disc material extending into the foramina bilaterally left greater than right and there is mild right and mild-to-moderate left foraminal narrowing at L3-4.    At L4-5 there is moderate bilateral facet overgrowth and a 4 mm degenerative anterolisthesis of L4 on L5, mild diffuse posterior disc bulge, and there is mild narrowing of the thecal sac and mild bilateral foraminal narrowing.       Results Review:   I reviewed the patient's new clinical results.  I personally viewed and interpreted the patient's clinical data and lumbar spine RANDEE imaging and discussed the results with Dr. Warren.     Vital Signs   Temp:  [98.1 °F (36.7 °C)-98.5 °F (36.9 °C)] 98.1 °F (36.7 °C)  Heart Rate:  [61-70] 61  Resp:  [16] 16  BP: (102-182)/() 102/50    Physical Exam:  Physical Exam  Vitals reviewed.   Constitutional:       General: She is awake. She is not in acute distress.     Appearance: She is underweight. She is ill-appearing ( Chronically).      Comments: Frail appearing   HENT:      Head: Normocephalic and atraumatic.      Nose: Nose normal.      Mouth/Throat:      Mouth: Mucous membranes are moist.   Eyes:      Extraocular Movements: Extraocular movements intact.      Conjunctiva/sclera: Conjunctivae normal.      Pupils: Pupils are equal, round, and reactive to light.   Pulmonary:      Effort:  Pulmonary effort is normal.   Abdominal:      Palpations: Abdomen is soft.   Musculoskeletal:         General: Normal range of motion.      Cervical back: Normal range of motion and neck supple.      Lumbar back: Bony tenderness present. Negative right straight leg raise test and negative left straight leg raise test.   Skin:     General: Skin is warm and dry.   Neurological:      General: No focal deficit present.      Mental Status: She is alert and oriented to person, place, and time. Mental status is at baseline.      GCS: GCS eye subscore is 4. GCS verbal subscore is 5. GCS motor subscore is 6.      Sensory: Sensation is intact.      Motor: Motor function is intact. No weakness.      Coordination: Coordination is intact.      Deep Tendon Reflexes: Strength normal.      Reflex Scores:       Tricep reflexes are 1+ on the right side and 1+ on the left side.       Bicep reflexes are 1+ on the right side and 1+ on the left side.       Brachioradialis reflexes are 1+ on the right side and 1+ on the left side.       Patellar reflexes are 1+ on the right side and 1+ on the left side.       Achilles reflexes are 1+ on the right side and 1+ on the left side.  Psychiatric:         Mood and Affect: Mood normal.         Behavior: Behavior normal. Behavior is cooperative.         Thought Content: Thought content normal.         Judgment: Judgment normal.       Neurologic Exam     Mental Status   Oriented to person, place, and time.   Attention: normal. Concentration: normal.   Speech: (Slow to respond to questions)  Level of consciousness: alert  Abnormal comprehension.   Poor fund of knowledge     Cranial Nerves   Cranial nerves II through XII intact.     CN III, IV, VI   Pupils are equal, round, and reactive to light.    Motor Exam   Muscle bulk: normal  Overall muscle tone: normal    Strength   Strength 5/5 throughout.     Sensory Exam   Light touch normal.     Gait, Coordination, and Reflexes     Reflexes   Right  "brachioradialis: 1+  Left brachioradialis: 1+  Right biceps: 1+  Left biceps: 1+  Right triceps: 1+  Left triceps: 1+  Right patellar: 1+  Left patellar: 1+  Right achilles: 1+  Left achilles: 1+  Right : 1+  Left : 1+  Right ankle clonus: absent  Left ankle clonus: absent  Gait deferred       Assessment/Plan       Hypoxia    Protrusion of lumbar intervertebral disc    Chronic back pain    87-year-old chronically ill, frail appearing female patient who presented to [facility] ED with progressive worsening low back pain.  Patient suffered a fall approximately 4-5 weeks ago with only minimal increase in back pain.  Lumbar spine MRI imaging demonstrated stable lumbar spondylosis along with left-sided L4 disc protrusion traversing left L4 nerve root causing severe narrowing of the thecal sac at L3-4 with diminished spinal fluid bathing the cauda equina at this level.     On exam, there are no red flags.  The patient is strong and has equal strength in the lower extremities bilaterally.  She denies any numbness, tingling, weakness, pain in the lower extremities.  She states that her back pain goes across the lower lumbar region, however she reports that is not severe.  She is not interested in any surgical intervention and is anxious to go home.  At this time, there are no deficits on exam and recommend symptom management.  Will reevaluate patient tomorrow and speak with her family at the bedside about diagnosis and treatment recommendations.      PLAN:   Decadron 4 mg IV Q6H x4 doses then transition her to 10 day MDP  Lidoderm patches  Robaxin 500 mg PO Q6H PRN      I discussed the patient's findings and my recommendations with patient, nursing staff and Dr. Briana Narvaez, APRN  04/05/22  18:15 EDT    \"Dictated utilizing Dragon dictation\".      "

## 2022-04-05 NOTE — PLAN OF CARE
Goal Outcome Evaluation:  Plan of Care Reviewed With: patient, family        Progress: improving  Outcome Evaluation: Pt resting in bed in NAD, reluctantly agrees to participate in PT. Pt req min A of 2 for bed mobility, osbaldo to raise legs back into bed. Pt req min A to stand from EOB. Pt amb 12' from bed to stretcher in hallway with r wx and min A Of 2 - slow pace but improved from yesterday, shuffled steps, flexed posture. Decreased endurance limits further amb distance. Pt placed on stretcher for transport for stress test. Overall, increased amb distance today but still fatigeus easily. Cont PT to address functional deficits and progress as tolerated.  Patient was intermittently wearing a face mask during this therapy encounter. Therapist used appropriate personal protective equipment including mask and gloves.  Mask used was standard procedure mask. Appropriate PPE was worn during the entire therapy session. Hand hygiene was completed before and after therapy session. Patient is not in enhanced droplet precautions. PT Loretta Coulter

## 2022-04-05 NOTE — PROGRESS NOTES
"Daily progress note    Chief complaint  Doing same  No new complaints  Still with severe back pain  Denies chest pain shortness of breath palpitation    History of present illness  87-year-old white female with history of osteoarthritis rheumatoid arthritis degenerative disease hypertension hyperlipidemia and gastroesophageal reflux disease who is well-known to our service presented to [facility] emergency room with severe back pain as she fell 3 weeks ago.  Patient denies any radiation of the pain.  Patient work-up in ER revealed hypoxia but she denies any chest pain shortness of breath palpitation.  Patient also denies any fever cough congestion night sweats weight loss or weight gain.  Patient admitted for management.    REVIEW OF SYSTEMS  Unremarkable except back pain     PHYSICAL EXAM  Blood pressure 102/50, pulse 61, temperature 98.1 °F (36.7 °C), temperature source Oral, resp. rate 16, height 154.9 cm (61\"), weight 47.6 kg (104 lb 15 oz), SpO2 92 %.    General: No acute distress.  HENT: NCAT, PERRL, Nares patent.  Eyes: no scleral icterus.  Neck: trachea midline, no ROM limitations.  CV: regular rhythm, regular rate.  Respiratory: normal effort, inspiratory crackles at bilateral lung bases.  Abdomen: soft, nondistended nontender bowel sounds positive  Musculoskeletal: no deformity.  Lumbar spine: No step-offs or deformities, no midline tenderness to palpation, bilateral paraspinal tenderness to palpation.  5 out of 5 strength with sensation intact to light touch bilateral lower extremities.  Neuro: alert, moves all extremities, follows commands.  Skin: warm, dry.  Discoloration of bilateral legs which patient states is chronic, no signs of infection, nonpitting edema bilateral lower extremities     LAB RESULTS  Lab Results (last 24 hours)     Procedure Component Value Units Date/Time    Basic Metabolic Panel [[id number]]  (Abnormal) Collected: 04/05/22 0624    Specimen: Blood Updated: 04/05/22 0724 "     Glucose 103 mg/dL      BUN 18 mg/dL      Creatinine 0.87 mg/dL      Sodium 139 mmol/L      Potassium 4.3 mmol/L      Chloride 101 mmol/L      CO2 31.7 mmol/L      Calcium 9.3 mg/dL      BUN/Creatinine Ratio 20.7     Anion Gap 6.3 mmol/L      eGFR 64.6 mL/min/1.73      Comment: National Kidney Foundation and American Society of Nephrology (ASN) Task Force recommended calculation based on the Chronic Kidney Disease Epidemiology Collaboration (CKD-EPI) equation refit without adjustment for race.       Narrative:      GFR Normal >60  Chronic Kidney Disease <60  Kidney Failure <15      CBC & Differential [[id number]]  (Abnormal) Collected: 04/05/22 0624    Specimen: Blood Updated: 04/05/22 0701    Narrative:      The following orders were created for panel order CBC & Differential.  Procedure                               Abnormality         Status                     ---------                               -----------         ------                     CBC Auto Differential[[id number]]        Abnormal            Final result                 Please view results for these tests on the individual orders.    CBC Auto Differential [[id number]]  (Abnormal) Collected: 04/05/22 0624    Specimen: Blood Updated: 04/05/22 0701     WBC 9.64 10*3/mm3      RBC 4.68 10*6/mm3      Hemoglobin 12.4 g/dL      Hematocrit 39.5 %      MCV 84.4 fL      MCH 26.5 pg      MCHC 31.4 g/dL      RDW 13.6 %      RDW-SD 41.2 fl      MPV 10.6 fL      Platelets 322 10*3/mm3      Neutrophil % 58.5 %      Lymphocyte % 22.3 %      Monocyte % 13.6 %      Eosinophil % 4.8 %      Basophil % 0.4 %      Immature Grans % 0.4 %      Neutrophils, Absolute 5.64 10*3/mm3      Lymphocytes, Absolute 2.15 10*3/mm3      Monocytes, Absolute 1.31 10*3/mm3      Eosinophils, Absolute 0.46 10*3/mm3      Basophils, Absolute 0.04 10*3/mm3      Immature Grans, Absolute 0.04 10*3/mm3      nRBC 0.0 /100 WBC         Imaging Results (Last 24 Hours)     Procedure Component Value  Units Date/Time    MRI Lumbar Spine Without Contrast [[id number]] Collected: 04/05/22 1536     Updated: 04/05/22 1536    Narrative:      MRI OF THE LUMBAR SPINE WITHOUT CONTRAST 04/05/2022     CLINICAL HISTORY: Patient fell 4 months ago, has chronic low back pain.     TECHNIQUE: Sagittal T1, proton density and fat-suppressed T2-weighted  images were obtained of the lumbar spine. In addition axial T2-weighted  images were obtained from the inferior body of T11 down to the superior  body of the S2 sacral level and thin cut axial T1-weighted images were  obtained angled through the interspaces from T12 to S1.     COMPARISON: This is correlated to prior lumbar spine plain film series  from [facility] 03/31/2022 and a previous MRI of the  lumbar spine on 08/24/2020.     FINDINGS: The distal thoracic cord and the conus are normal in signal  intensity. The conus terminates at the L1-2 interspace level which is  normal.     At T11-12 the posterior disc margin and facets are normal with no canal  or foraminal narrowing.     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 Along the inner margin of the  dextroscoliotic curve there is severe narrowing of the left-sided disc  space and large left lateral marginal bridging osteophytes at L2-3.  There is also prominent narrowing of the left side of the disc space and  left-sided degenerative marrow signal changes at L3-4.     At T12-L1 there is disc space narrowing and degenerative endplate  changes most pronounced in the right side of the endplates with some  mild degenerative marrow edema in the right side of the endplates. The  posterior disc margin and facets are normal and there is no canal or  left foraminal narrowing. There is mild right bony foraminal narrowing.     At L1-2 there is minimal diffuse posterior disc bulge and minimal facet  overgrowth. There is essentially no canal or foraminal  narrowing.     At L2-3 there is minimal facet overgrowth. There is disc space narrowing  and degenerative endplate changes most pronounced at the left side of  the endplates and slight eccentric spurring and bulging disc material  left posterior laterally.  There is mild narrowing of left side of the  canal. There is no right foraminal narrowing. There is eccentric  spurring and bulging disc material into the inferior left foramen and  more prominent to the far left laterally and there is mild left bony  foraminal narrowing.     At L3-4 there is some eccentric disc space narrowing and degenerative  endplate changes at the left side of the disc space with some minimal  degenerative marrow edema left lateral endplates. There is mild right  and mild-to-moderate left facet overgrowth and there is a 4 mm  retrolisthesis of L3 with respect to L4 with uncovering of the posterior  inferior aspect of the L3-4 disc space and disc material diffusely  bulging along the posterior superior endplate of L4 eccentrically  protruding along the left posterior superior body and endplate of L4.  There is severe narrowing of the thecal sac and slight diminished spinal  fluid bathing the cauda equina at the L3-4 level. There is some left  lateral recess narrowing with uncovered bulging or protruding disc  material extending along the left posterior superior endplate of L4  abutting the ventral aspect of the traversing left L4 nerve root. There  is spurring and bulging disc material into the neural foramina left  greater than right and there is mild right and mild-to-moderate left  foraminal narrowing.     At L4-5 there is moderate bilateral facet overgrowth. There is a 4 mm  degenerative anterolisthesis of L4 on L5, mild disc space narrowing and  disc desiccation, and mild diffuse posterior disc bulge. There is mild  narrowing of the thecal sac and there is mild bilateral foraminal  narrowing.     At L5-S1 there is mild left and moderate  right facet overgrowth and some  fluid in the facet joints. Minimal posterior disc bulge. There is no  canal or lateral recess narrowing. There is mild foraminal narrowing.     There is abnormal T1 low signal, T2 high signal in the right and left  sacral ala from the S1 to S4 sacral levels and there is a horizontal  area of bone marrow edema tracking throughout the superior body of S3  into the inferior body of S2 with horizontal areas of linear signal loss  in the superior body of the S3 sacral level and I think this is  compatible with marrow edema associated with an acute to subacute  H-shaped sacral fracture with vertical fracture of the right and left  sacral ala on horizontal fracture superior body of the S3 sacral level.  There may be fractures that are nondisplaced at the distal L5 transverse  processes bilaterally. This a new finding when compared to MRI  08/24/2020. Bilateral foraminal narrowing. The remainder of the lumbar  spine MRI is unremarkable and the results were communicated to Twin Minor MD by telephone 04/05/2022 at 3 PM.       Impression:      1. Since prior lumbar spine MRI from [facility] on  08/24/2020 the patient has developed vertical hairline nondisplaced  bilateral sacral alar fractures from S1 to S4 and a horizontal  nondisplaced fracture through the superior body of the S3 sacral level  and there is exuberant marrow edema tracking into the right and left  sacral ala from S1 to S4 and horizontally across the bodies at S2-3  compatible with an acute to subacute H-shaped sacral fracture. There  appear to be possible additional nondisplaced fractures of the lateral  aspect of the right and left transverse processes of L5.  2. The patient has a dextroscoliotic curvature of the lumbar spine with  its apex at the L2-3 lumbar level and lumbar spondylosis that is pretty  stable since prior MRI of the lumbar spine 08/24/2020. There is mild  narrowing of the left side of the  canal at L2-3. At L3-4 there is mild  right and mild-to-moderate left facet overgrowth and a 4 mm  retrolisthesis of L3 with respect to L4 with uncovered bulging disc  material extending diffusely along the posterior superior endplate of L4  and eccentrically protruding along the left posterior superior endplate  of L4 where it abuts the ventral aspect of the traversing left L4 nerve  root and there is severe narrowing of the thecal sac at L3-4 with  diminished spinal fluid bathing the cauda equina at this level. There is  spurring and bulging disc material extending into the foramina  bilaterally left greater than right and there is mild right and  mild-to-moderate left foraminal narrowing at L3-4.  3. At L4-5 there is moderate bilateral facet overgrowth and a 4 mm  degenerative anterolisthesis of L4 on L5, mild diffuse posterior disc  bulge, and there is mild narrowing of the thecal sac and mild bilateral  foraminal narrowing. At L5-S1 there is mild                      ECG 12 Lead  Component   Ref Range & Units 3/31/22 1736 11/20/21 0628 11/18/21 1414   QT Interval   ms 463  403  415              HEART RATE= 71  bpm  RR Interval= 845  ms  AR Interval= 145  ms  P Horizontal Axis= -26  deg  P Front Axis= 68  deg  QRSD Interval= 134  ms  QT Interval= 463  ms  QRS Axis= -7  deg  T Wave Axis= 59  deg  - ABNORMAL ECG -  Sinus rhythm  Left atrial enlargement  Right bundle branch block  No change from previous tracing             Current Facility-Administered Medications:   •  aspirin chewable tablet 81 mg, 81 mg, Oral, Daily, Twin Minor MD, 81 mg at 04/05/22 0808  •  atorvastatin (LIPITOR) tablet 10 mg, 10 mg, Oral, Nightly, Twin Minor MD, 10 mg at 04/04/22 2025  •  carvedilol (COREG) tablet 3.125 mg, 3.125 mg, Oral, BID With Meals, Twin Minor MD, 3.125 mg at 04/05/22 0808  •  clopidogrel (PLAVIX) tablet 75 mg, 75 mg, Oral, Daily, Twin Minor MD, 75 mg at 04/05/22 0808  •  furosemide (LASIX) tablet 20 mg, 20  mg, Oral, BID, Jonny Minor MD, 20 mg at 04/05/22 1346  •  guaiFENesin (MUCINEX) 12 hr tablet 600 mg, 600 mg, Oral, Q12H, Jonny Minor MD, 600 mg at 04/05/22 0808  •  hydrALAZINE (APRESOLINE) injection 10 mg, 10 mg, Intravenous, Q6H PRN, Ofelia Fisher MD, 10 mg at 04/05/22 0808  •  HYDROcodone-acetaminophen (NORCO) 5-325 MG per tablet 1 tablet, 1 tablet, Oral, Q6H PRN, Jonny Minor MD, 1 tablet at 04/05/22 1347  •  hydroxychloroquine (PLAQUENIL) tablet 200 mg, 200 mg, Oral, Daily, Jonny Minor MD, 200 mg at 04/05/22 0808  •  ipratropium-albuterol (DUO-NEB) nebulizer solution 3 mL, 3 mL, Nebulization, Q4H PRN, Jonny Minor MD  •  levothyroxine (SYNTHROID, LEVOTHROID) tablet 88 mcg, 88 mcg, Oral, QAM, Jonny Minor MD, 88 mcg at 04/05/22 0629  •  losartan (COZAAR) tablet 100 mg, 100 mg, Oral, Q24H, Jonny Minor MD, 100 mg at 04/05/22 0808  •  pantoprazole (PROTONIX) EC tablet 40 mg, 40 mg, Oral, Q AM, Jonny Minor MD, 40 mg at 04/05/22 0629  •  PARoxetine (PAXIL) tablet 20 mg, 20 mg, Oral, Nightly, Jonny Minor MD, 20 mg at 04/04/22 2025  •  potassium chloride (KLOR-CON) packet 20 mEq, 20 mEq, Oral, BID With Meals, Jonny Minor MD, 20 mEq at 04/05/22 1347     ASSESSMENT  Acute hypoxic respiratory failure  New onset congestive heart failure  Hypertension  Hyperlipidemia  Hypothyroidism  Osteoarthritis  Rheumatoid arthritis  Degenerative disc disease/lumbar stenosis disc bulge spondylosis  Bilateral sacral alar fractures  Depression  Gastroesophageal reflux disease    PLAN  CPM  Supplement oxygen  Nebulizer and incentive spirometry  Diuresis  Replace potassium  Stress Cardiolite today  Pain management  Neurosurgery consult  Cardiology and pulmonary consult appreciated  Adjust home medications  Stress ulcer DVT prophylaxis  Supportive care  DNR  PT OT  Discussed with family nursing staff and pulmonary  Follow closely further recommendation current hospital course    JONNY MINOR MD

## 2022-04-05 NOTE — PROGRESS NOTES
[facility]  Cardiology Progress Note    Patient Identification:  Name: Verona Avila  Age: 87 y.o.  Sex: female  :  1934  MRN: [id number]                 Follow Up / Chief Complaint: Follow up for CHF    Interval History: Pulm. Following and recommend CT in the outpatient setting.  She is to have a stress test today.  Further recommendations once stress test has been read.        Subjective: Shortness of breath has improved and denies any chest pain.    Objective:    Past Medical History:  Past Medical History:   Diagnosis Date   • Arthritis    • Atherosclerosis    • Depression    • Difficulty walking    • Disease of thyroid gland    • Hypertension    • Muscle weakness    • Spinal stenosis      Past Surgical History:  Past Surgical History:   Procedure Laterality Date   • THYROID SURGERY     • TONSILLECTOMY          Social History:   Social History     Tobacco Use   • Smoking status: Former Smoker   • Smokeless tobacco: Never Used   Substance Use Topics   • Alcohol use: No      Family History:  History reviewed. No pertinent family history.       Allergies:  No Known Allergies  Scheduled Meds:  aspirin, 81 mg, Daily  atorvastatin, 10 mg, Nightly  carvedilol, 3.125 mg, BID With Meals  clopidogrel, 75 mg, Daily  furosemide, 20 mg, BID  guaiFENesin, 600 mg, Q12H  hydroxychloroquine, 200 mg, Daily  levothyroxine, 88 mcg, QAM  losartan, 100 mg, Q24H  pantoprazole, 40 mg, Q AM  PARoxetine, 20 mg, Nightly  potassium chloride, 20 mEq, BID With Meals            INTAKE AND OUTPUT:    Intake/Output Summary (Last 24 hours) at 2022 0918  Last data filed at 2022 0300  Gross per 24 hour   Intake 480 ml   Output 1050 ml   Net -570 ml       ROS  Constitutional: Awake and alert, no fever.  No nosebleeds  Abdomen          no abdominal pain   Cardiac              no chest pain  Pulmonary          no shortness of breath      /88   Pulse 65   Temp 98.1 °F (36.7 °C) (Oral)   Resp 16   Ht 154.9 cm  "(61\")   Wt 47.6 kg (104 lb 15 oz)   SpO2 92%   BMI 19.83 kg/m²          Physical Exam:  General:  Appears in no acute distress, resting in bed with family at bedside  Eyes: EOM normal no conjunctival drainage  HEENT:  No JVD. Thyroid not visibly enlarged. No mucosal pallor or cyanosis  Respiratory: Respirations regular and unlabored at rest. BBS with good air entry in all fields. No crackles, rubs or wheezes auscultated  Cardiovascular: S1S2 Regular rate and rhythm. No murmur, rub or gallop. No carotid bruits. DP/PT pulses 2+    . No pretibial pitting edema  Gastrointestinal: Abdomen soft, flat, non tender. Bowel sounds present. No hepatosplenomegaly. No ascites  Skin:   Skin warm and dry to touch. No rashes    Neuro: AAO x3 CN II-XII grossly intact  Psych: Mood and affect normal, pleasant and cooperative          I reviewed the patient's new clinical results, and personally reviewed and interpreted the patient's ECG and telemetry data from the last 24 hours      Cardiographics  Telemetry:       SR        ECG:   SR with RBBB        Echocardiogram:   Interpretation Summary    · Estimated right ventricular systolic pressure from tricuspid regurgitation is normal (<35 mmHg).  · Calculated left ventricular EF = 58.9% Estimated left ventricular EF was in agreement with the calculated left ventricular EF.  · Left ventricular diastolic function was normal.  · There is no evidence of pericardial effusion.      holter 2021  Interpretation Summary    · A normal monitor study.  · NSR WITH RARE PACS, AND NSSVT           Lab Review   Results from last 7 days   Lab Units 04/02/22  0540 03/31/22  1710   TROPONIN T ng/mL <0.010 0.015     Results from last 7 days   Lab Units 03/31/22  1710   MAGNESIUM mg/dL 2.0     Results from last 7 days   Lab Units 04/05/22  0624   SODIUM mmol/L 139   POTASSIUM mmol/L 4.3   BUN mg/dL 18   CREATININE mg/dL 0.87   CALCIUM mg/dL 9.3        Results from last 7 days   Lab Units 04/05/22  0624 " "04/04/22  0745 04/03/22  0737   WBC 10*3/mm3 9.64 8.50 8.16   HEMOGLOBIN g/dL 12.4 10.9* 10.8*   HEMATOCRIT % 39.5 34.9 33.6*   PLATELETS 10*3/mm3 322 297 262     Results from last 7 days   Lab Units 03/31/22  1710   INR  1.04   APTT seconds 29.9     The following medical decision was discussed in detail with Dr. Fisher      Assessment:     Acute hypoxic respiratory failure  New onset CHF  Hypertension  Hyperlipidemia         Plan:  Monitor reviewed 5 beat run of SVT last night.  Sinus rhythm currently.  Blood pressure elevated this morning.  Hydralazine given.  She will need to have manual blood pressures as she was having inaccurate readings with automated blood pressure cuff.  Her echo revealed normal LV function with a EF of 58%, see full report as above.  She is to have a stress test today.  Further recommendations once stress test has been completed.     It was explained to the patient that stress testing carries 85% specificity/sensitivity, and does not rule out future cardiac event.  Risks of the procedure were explained to the patient including shortness of breath, induction of myocardial infarction, and dizziness.  Patient is agreeable to proceeding with stress testing.       Labs/tests ordered for am: stress test    )4/5/2022  ADAIR Salmeron/Transcription:   \"Dictated utilizing Dragon dictation\".     "

## 2022-04-05 NOTE — PROGRESS NOTES
Off floor for stress test.    Ronnie Christian MD  [facility]  04/05/22  10:43 EDT

## 2022-04-05 NOTE — DISCHARGE PLACEMENT REQUEST
"Benito Avila (87 y.o. Female)             Date of Birth   1934    Social Security Number       Address   [address] [place] [address] [place] [address]    Home Phone       MRN   [id number]       Zoroastrianism   M[employer]onism    Marital Status                               Admission Date   3/31/22    Admission Type   Emergency    Admitting Provider   Twin Minor MD    Attending Provider   Twin Minor MD    Department, Room/Bed   [address], [address]/1       Discharge Date       Discharge Disposition       Discharge Destination                               Attending Provider: Twin Minor MD    Allergies: No Known Allergies    Isolation: None   Infection: None   Code Status: No CPR   Advance Care Planning Activity    Ht: 154.9 cm (61\")   Wt: 47.6 kg (104 lb 15 oz)    Admission Cmt: None   Principal Problem: None                Active Insurance as of 3/31/2022     Primary Coverage     Payor Plan Insurance Group Employer/Plan Group    MEDICARE MEDICARE A & B      Payor Plan Address Payor Plan Phone Number Payor Plan Fax Number Effective Dates    [address] [phone]  8/1/1999 - None Entered    [facility] [id number]       Subscriber Name Subscriber Birth Date Member ID       BENITO AVILA 1934 [id number]           Secondary Coverage     Payor Plan Insurance Group Employer/Plan Group    ANTH BLUE CROSS [facility] SUPP [id number]     Payor Plan Address Payor Plan Phone Number Payor Plan Fax Number Effective Dates    [address]   12/1/2016 - None Entered    [facility] [id number]       Subscriber Name Subscriber Birth Date Member ID       BENITO AVILA 1934 [id number]                 Emergency Contacts      (Rel.) Home Phone Work Phone Mobile Phone    Jair(nephew/POA)Mj (Relative) [phone] -- [phone]    Rudy Avila (Relative) [phone] -- [phone]    Robin Adam (Relative) [phone] -- --          "

## 2022-04-05 NOTE — CASE MANAGEMENT/SOCIAL WORK
Continued Stay Note  [facility]     Patient Name: Verona Avila  MRN: [id number]  Today's Date: 4/5/2022    Admit Date: 3/31/2022     Discharge Plan     Row Name 04/05/22 0931       Plan    Plan return to [facility] vs SNF    Patient/Family in Agreement with Plan yes    Plan Comments Pt from [facility].  Plans to return to [facility] either [facility] or SNf if needed.  Notified Alyssa/[facility] who will follow.  CLAUDY Beard RN               Discharge Codes    No documentation.               Expected Discharge Date and Time     Expected Discharge Date Expected Discharge Time    Apr 6, 2022             Julia Beard, RN

## 2022-04-05 NOTE — THERAPY TREATMENT NOTE
Patient Name: Verona Avila  : 1934    MRN: [id number]                              Today's Date: 2022       Admit Date: 3/31/2022    Visit Dx:     ICD-10-CM ICD-9-CM   1. Hypoxia  R09.02 799.02   2. Acute bilateral low back pain without sciatica  M54.50 724.2     338.19     Patient Active Problem List   Diagnosis   • Syncope   • Hypoxia     Past Medical History:   Diagnosis Date   • Arthritis    • Atherosclerosis    • Depression    • Difficulty walking    • Disease of thyroid gland    • Hypertension    • Muscle weakness    • Spinal stenosis      Past Surgical History:   Procedure Laterality Date   • THYROID SURGERY     • TONSILLECTOMY        General Information     Row Name 22 1128          Physical Therapy Time and Intention    Document Type therapy note (daily note)  -DJ     Mode of Treatment individual therapy;physical therapy  -DJ     Row Name 22 1128          General Information    Patient Profile Reviewed yes  -DJ     Existing Precautions/Restrictions fall;oxygen therapy device and L/min  1L  -DJ     Row Name 22 1128          Cognition    Orientation Status (Cognition) oriented x 3  -DJ     Row Name 22 1128          Safety Issues, Functional Mobility    Comment, Safety Issues/Impairments (Mobility) gt belt, nonskid socks  -DJ           User Key  (r) = Recorded By, (t) = Taken By, (c) = Cosigned By    Initials Name Provider Type    Komal Fair, PT Physical Therapist               Mobility     Row Name 22 1129          Bed Mobility    Bed Mobility supine-sit;sit-supine  -DJ     Supine-Sit English (Bed Mobility) minimum assist (75% patient effort);2 person assist;verbal cues  -DJ     Sit-Supine English (Bed Mobility) minimum assist (75% patient effort);2 person assist;verbal cues  -DJ     Assistive Device (Bed Mobility) bed rails;head of bed elevated;draw sheet  -DJ     Comment, (Bed Mobility) dependent to reposition  -DJ     Row Name 22 1123           Transfers    Comment, (Transfers) sit/stand from EOB  -DJ     Row Name 04/05/22 1129          Bed-Chair Transfer    Bed-Chair [place] (Transfers) not tested  -DJ     Row Name 04/05/22 1129          Sit-Stand Transfer    Sit-Stand [place] (Transfers) minimum assist (75% patient effort);2 person assist;verbal cues  -DJ     Assistive Device (Sit-Stand Transfers) walker, front-wheeled  -DJ     Row Name 04/05/22 1129          Gait/Stairs (Locomotion)    [place] Level (Gait) minimum assist (75% patient effort);verbal cues;2 person assist  -DJ     Assistive Device (Gait) walker, front-wheeled  -DJ     Distance in Feet (Gait) 12'  -DJ     Deviations/Abnormal Patterns (Gait) festinating/shuffling;gait speed decreased;stride length decreased  -DJ     Bilateral Gait Deviations forward flexed posture  -DJ     [place] Level (Stairs) not tested  -DJ     Comment, (Gait/Stairs) Pt amb 12' from bed to stretcher in hallway with r wx and min A Of 2 - slow pace but improved from yesterday, shuffled steps, flexed posture. Decreased endurance limits further amb distance. Pt placed on stretcher for transport for stress test.  -DJ           User Key  (r) = Recorded By, (t) = Taken By, (c) = Cosigned By    Initials Name Provider Type    Komal Fair, PT Physical Therapist               Obj/Interventions     Row Name 04/05/22 1132          Motor Skills    Therapeutic Exercise other (see comments)  no ther ex due to leaving floor for stress test  -DJ     Row Name 04/05/22 1132          Balance    Balance Assessment standing static balance;standing dynamic balance  -DJ     Static Standing Balance minimal assist;verbal cues;2-person assist  -DJ     Dynamic Standing Balance moderate assist;verbal cues;2-person assist  -DJ     Position/Device Used, Standing Balance supported;walker, front-wheeled  -DJ     Balance Interventions sitting;standing;sit to stand;supported;weight shifting activity  -DJ           User Key  (r) =  Recorded By, (t) = Taken By, (c) = Cosigned By    Initials Name Provider Type    Komal Fair, PT Physical Therapist               Goals/Plan    No documentation.                Clinical Impression     Row Name 04/05/22 1133          Pain    Pre/Posttreatment Pain Comment c/o being tired  -DJ     Row Name 04/05/22 1133          Plan of Care Review    Plan of Care Reviewed With patient;family  -DJ     Progress improving  -DJ     Outcome Evaluation Pt resting in bed in NAD, reluctantly agrees to participate in PT. Pt req min A of 2 for bed mobility, osbaldo to raise legs back into bed. Pt req min A to stand from EOB. Pt amb 12' from bed to stretcher in hallway with r wx and min A Of 2 - slow pace but improved from yesterday, shuffled steps, flexed posture. Decreased endurance limits further amb distance. Pt placed on stretcher for transport for stress test. Overall, increased amb distance today but still fatigeus easily. Cont PT to address functional deficits and progress as tolerated.  -DJ     Row Name 04/05/22 1133          Therapy Assessment/Plan (PT)    Criteria for Skilled Interventions Met (PT) skilled treatment is necessary  -DJ     Row Name 04/05/22 1133          Vital Signs    O2 Delivery Pre Treatment supplemental O2  1L  -DJ     O2 Delivery Intra Treatment room air  -DJ     O2 Delivery Post Treatment supplemental O2  1L  -DJ     Pre Patient Position Supine  -DJ     Intra Patient Position Standing  -DJ     Post Patient Position Supine  -DJ     Row Name 04/05/22 1133          Positioning and Restraints    Pre-Treatment Position in bed  -DJ     Post Treatment Position bed  -DJ     In Bed notified nsg;supine;with other staff;side rails up x2  transport stretcher  -DJ           User Key  (r) = Recorded By, (t) = Taken By, (c) = Cosigned By    Initials Name Provider Type    Komal Fair, PT Physical Therapist               Outcome Measures     Row Name 04/05/22 1136          How much help from another person do  you currently need...    Turning from your back to your side while in flat bed without using bedrails? 3  -DJ     Moving from lying on back to sitting on the side of a flat bed without bedrails? 3  -DJ     Moving to and from a bed to a chair (including a wheelchair)? 3  -DJ     Standing up from a chair using your arms (e.g., wheelchair, bedside chair)? 3  -DJ     Climbing 3-5 steps with a railing? 1  -DJ     To walk in hospital room? 2  -DJ     AM-PAC 6 Clicks Score (PT) 15  -DJ     Row Name 04/05/22 1136          Functional Assessment    Outcome Measure Options AM-PAC 6 Clicks Basic Mobility (PT)  -DJ           User Key  (r) = Recorded By, (t) = Taken By, (c) = Cosigned By    Initials Name Provider Type    Komal Fair, PT Physical Therapist                             Physical Therapy Education                 Title: PT OT SLP Therapies (Done)     Topic: Physical Therapy (Done)     Point: Mobility training (Done)     Learning Progress Summary           Patient Acceptance, E, VU,NR by JOHNATHAN at 4/5/2022 1137    Acceptance, E, VU,NR by DJ at 4/4/2022 1453    Acceptance, E, VU by SB at 4/4/2022 1448    Acceptance, E, VU,NR by KH at 4/2/2022 1632                   Point: Home exercise program (Done)     Learning Progress Summary           Patient Acceptance, E, VU,NR by DJ at 4/4/2022 1453    Acceptance, E, VU by SB at 4/4/2022 1448                   Point: Body mechanics (Done)     Learning Progress Summary           Patient Acceptance, E, VU,NR by DJ at 4/5/2022 1137    Acceptance, E, VU,NR by DJ at 4/4/2022 1453    Acceptance, E, VU by SB at 4/4/2022 1448    Acceptance, E, VU,NR by KH at 4/2/2022 1632                   Point: Precautions (Done)     Learning Progress Summary           Patient Acceptance, E, VU,NR by DJ at 4/5/2022 1137    Acceptance, E, VU,NR by DJ at 4/4/2022 1453    Acceptance, E, VU by SB at 4/4/2022 1448                               User Key     Initials Effective Dates Name Provider Type  Discipline    SB 06/16/21 -  Anna Eldridge, RN Registered Nurse Nurse    DJ 10/25/19 -  Komal Leblanc, GREG Physical Therapist PT     11/03/21 -  Tatiana Madera PT Physical Therapist PT              PT Recommendation and Plan     Plan of Care Reviewed With: patient, family  Progress: improving  Outcome Evaluation: Pt resting in bed in NAD, reluctantly agrees to participate in PT. Pt req min A of 2 for bed mobility, osbaldo to raise legs back into bed. Pt req min A to stand from EOB. Pt amb 12' from bed to stretcher in hallway with r wx and min A Of 2 - slow pace but improved from yesterday, shuffled steps, flexed posture. Decreased endurance limits further amb distance. Pt placed on stretcher for transport for stress test. Overall, increased amb distance today but still fatigeus easily. Cont PT to address functional deficits and progress as tolerated.     Time Calculation:    PT Charges     Row Name 04/05/22 1138             Time Calculation    Start Time 0939  -DJ      Stop Time 0954  -DJ      Time Calculation (min) 15 min  -DJ      PT Non-Billable Time (min) 10 min  -DJ      PT Received On 04/05/22  -DJ      PT - Next Appointment 04/06/22  -DJ            User Key  (r) = Recorded By, (t) = Taken By, (c) = Cosigned By    Initials Name Provider Type    Komal Fair, PT Physical Therapist              Therapy Charges for Today     Code Description Service Date Service Provider Modifiers Qty    24298163687 HC PT THERAPEUTIC ACT EA 15 MIN 4/4/2022 Komal Leblanc, PT GP 1    94847640316 HC GAIT TRAINING EA 15 MIN 4/4/2022 Komal Leblanc, PT GP 1    78142304818 HC PT THERAPEUTIC ACT EA 15 MIN 4/5/2022 Komal Leblanc, PT GP 1    30169289105 HC PT THER SUPP EA 15 MIN 4/5/2022 Komal Leblanc, PT GP 1          PT G-Codes  Outcome Measure Options: AM-PAC 6 Clicks Basic Mobility (PT)  AM-PAC 6 Clicks Score (PT): 15    Komal Leblanc PT  4/5/2022

## 2022-04-05 NOTE — SIGNIFICANT NOTE
04/05/22 1200   OTHER   Discipline occupational therapist   Rehab Time/Intention   Session Not Performed other (see comments)  (MOUSTAPHA for a stress test this AM.)   Recommendation   OT - Next Appointment 04/06/22

## 2022-04-05 NOTE — PLAN OF CARE
Problem: Adult Inpatient Plan of Care  Goal: Plan of Care Review  Outcome: Ongoing, Progressing  Flowsheets (Taken 4/5/2022 9619)  Progress: improving  Plan of Care Reviewed With: patient  Outcome Evaluation: VSS, Pt is A/O x4.  Norco's given twice on shift which helped patient durning cardiac stress test and lumbar spine.  Patient was up and ambulated in room 4 times today to and from stretcher which was in the all. Pt needs walker and shuffles feet needing help to get from bed to standing.  Due to MRI results neuro-surgery was consulted.  Patient stated she absolutely did not want surgery.  New orders for steriods to try and help with bulging disc. Will continue to monitor.  Goal: Patient-Specific Goal (Individualized)  Outcome: Ongoing, Progressing  Goal: Absence of Hospital-Acquired Illness or Injury  Outcome: Ongoing, Progressing  Intervention: Identify and Manage Fall Risk  Recent Flowsheet Documentation  Taken 4/5/2022 1601 by Anna Eldridge RN  Safety Promotion/Fall Prevention: safety round/check completed  Taken 4/5/2022 1347 by Anna Eldridge RN  Safety Promotion/Fall Prevention: safety round/check completed  Taken 4/5/2022 1204 by Anna Eldridge RN  Safety Promotion/Fall Prevention: patient off unit  Taken 4/5/2022 1000 by Anna Eldridge RN  Safety Promotion/Fall Prevention: patient off unit  Taken 4/5/2022 0800 by Anna Eldridge RN  Safety Promotion/Fall Prevention: safety round/check completed  Intervention: Prevent Skin Injury  Recent Flowsheet Documentation  Taken 4/5/2022 1601 by Anna Eldridge RN  Body Position: position changed independently  Taken 4/5/2022 1347 by Anna Eldridge RN  Body Position: position changed independently  Taken 4/5/2022 0800 by Anna Eldridge RN  Body Position:   weight shifting   supine, legs elevated  Skin Protection: adhesive use limited  Intervention: Prevent and Manage VTE (Venous Thromboembolism) Risk  Recent Flowsheet  Documentation  Taken 4/5/2022 1601 by Anna Eldridge RN  Activity Management: activity adjusted per tolerance  Taken 4/5/2022 1347 by Anna Eldridge RN  Activity Management: ambulated in room  Taken 4/5/2022 0800 by Anna Eldridge RN  Activity Management: activity encouraged  Intervention: Prevent Infection  Recent Flowsheet Documentation  Taken 4/5/2022 1347 by Anna Eldridge RN  Infection Prevention: single patient room provided  Taken 4/5/2022 0800 by Anna Eldridge RN  Infection Prevention: environmental surveillance performed  Goal: Optimal Comfort and Wellbeing  Outcome: Ongoing, Progressing  Intervention: Monitor Pain and Promote Comfort  Recent Flowsheet Documentation  Taken 4/5/2022 1347 by Anna Eldridge RN  Pain Management Interventions: position adjusted  Taken 4/5/2022 0800 by Anna Eldridge RN  Pain Management Interventions:   position adjusted   see MAR  Goal: Readiness for Transition of Care  Outcome: Ongoing, Progressing     Problem: Fall Injury Risk  Goal: Absence of Fall and Fall-Related Injury  Outcome: Ongoing, Progressing  Intervention: Identify and Manage Contributors  Recent Flowsheet Documentation  Taken 4/5/2022 1601 by Anna Eldridge RN  Medication Review/Management: medications reviewed  Taken 4/5/2022 1347 by Anna Eldridge RN  Medication Review/Management: medications reviewed  Taken 4/5/2022 0800 by Anna Eldridge RN  Medication Review/Management: medications reviewed  Intervention: Promote Injury-Free Environment  Recent Flowsheet Documentation  Taken 4/5/2022 1601 by Anna Eldridge RN  Safety Promotion/Fall Prevention: safety round/check completed  Taken 4/5/2022 1347 by Anna Eldridge RN  Safety Promotion/Fall Prevention: safety round/check completed  Taken 4/5/2022 1204 by Anna Eldridge RN  Safety Promotion/Fall Prevention: patient off unit  Taken 4/5/2022 1000 by Anna Eldridge RN  Safety Promotion/Fall Prevention: patient  off unit  Taken 4/5/2022 0800 by Anna Eldridge RN  Safety Promotion/Fall Prevention: safety round/check completed     Problem: Skin Injury Risk Increased  Goal: Skin Health and Integrity  Outcome: Ongoing, Progressing  Intervention: Optimize Skin Protection  Recent Flowsheet Documentation  Taken 4/5/2022 1601 by Anna Eldridge RN  Head of Bed (HOB) Positioning:   HOB elevated   HOB at 30-45 degrees  Taken 4/5/2022 1347 by Anna Eldridge RN  Head of Bed (HOB) Positioning: HOB at 20-30 degrees  Taken 4/5/2022 0800 by Anna Eldridge RN  Pressure Reduction Techniques: frequent weight shift encouraged  Head of Bed (HOB) Positioning: HOB at 30-45 degrees  Pressure Reduction Devices: alternating pressure pump (ADD)  Skin Protection: adhesive use limited   Goal Outcome Evaluation:  Plan of Care Reviewed With: patient        Progress: improving  Outcome Evaluation: VSS, Pt is A/O x4.  Norco's given twice on shift which helped patient durning cardiac stress test and lumbar spine.  Patient was up and ambulated in room 4 times today to and from stretcher which was in the all. Pt needs walker and shuffles feet needing help to get from bed to standing.  Due to MRI results neuro-surgery was consulted.  Patient stated she absolutely did not want surgery.  New orders for steriods to try and help with bulging disc. Will continue to monitor.

## 2022-04-05 NOTE — PLAN OF CARE
Goal Outcome Evaluation:  Plan of Care Reviewed With: patient        Progress: no change     VSS. Remains on 1L nc sats in low 90's. NPO at midnight for stress test today. Also getting mri of back today. Resting comfortably. No other issues at this time.

## 2022-04-06 VITALS
TEMPERATURE: 97.7 F | RESPIRATION RATE: 17 BRPM | OXYGEN SATURATION: 98 % | HEART RATE: 70 BPM | WEIGHT: 104.94 LBS | BODY MASS INDEX: 19.81 KG/M2 | HEIGHT: 61 IN | SYSTOLIC BLOOD PRESSURE: 107 MMHG | DIASTOLIC BLOOD PRESSURE: 73 MMHG

## 2022-04-06 LAB
ANION GAP SERPL CALCULATED.3IONS-SCNC: 13.2 MMOL/L (ref 5–15)
BASOPHILS # BLD AUTO: 0 10*3/MM3 (ref 0–0.2)
BASOPHILS NFR BLD AUTO: 0 % (ref 0–1.5)
BUN SERPL-MCNC: 28 MG/DL (ref 8–23)
BUN/CREAT SERPL: 32.9 (ref 7–25)
CALCIUM SPEC-SCNC: 9.4 MG/DL (ref 8.6–10.5)
CHLORIDE SERPL-SCNC: 97 MMOL/L (ref 98–107)
CO2 SERPL-SCNC: 25.8 MMOL/L (ref 22–29)
CREAT SERPL-MCNC: 0.85 MG/DL (ref 0.57–1)
DEPRECATED RDW RBC AUTO: 40.3 FL (ref 37–54)
EGFRCR SERPLBLD CKD-EPI 2021: 66.4 ML/MIN/1.73
EOSINOPHIL # BLD AUTO: 0 10*3/MM3 (ref 0–0.4)
EOSINOPHIL NFR BLD AUTO: 0 % (ref 0.3–6.2)
ERYTHROCYTE [DISTWIDTH] IN BLOOD BY AUTOMATED COUNT: 13.6 % (ref 12.3–15.4)
GLUCOSE SERPL-MCNC: 130 MG/DL (ref 65–99)
HCT VFR BLD AUTO: 39.7 % (ref 34–46.6)
HGB BLD-MCNC: 12.7 G/DL (ref 12–15.9)
IMM GRANULOCYTES # BLD AUTO: 0.03 10*3/MM3 (ref 0–0.05)
IMM GRANULOCYTES NFR BLD AUTO: 0.6 % (ref 0–0.5)
LYMPHOCYTES # BLD AUTO: 0.91 10*3/MM3 (ref 0.7–3.1)
LYMPHOCYTES NFR BLD AUTO: 17.3 % (ref 19.6–45.3)
MCH RBC QN AUTO: 26.3 PG (ref 26.6–33)
MCHC RBC AUTO-ENTMCNC: 32 G/DL (ref 31.5–35.7)
MCV RBC AUTO: 82.4 FL (ref 79–97)
MONOCYTES # BLD AUTO: 0.14 10*3/MM3 (ref 0.1–0.9)
MONOCYTES NFR BLD AUTO: 2.7 % (ref 5–12)
NEUTROPHILS NFR BLD AUTO: 4.18 10*3/MM3 (ref 1.7–7)
NEUTROPHILS NFR BLD AUTO: 79.4 % (ref 42.7–76)
NRBC BLD AUTO-RTO: 0 /100 WBC (ref 0–0.2)
PLATELET # BLD AUTO: 369 10*3/MM3 (ref 140–450)
PMV BLD AUTO: 10.4 FL (ref 6–12)
POTASSIUM SERPL-SCNC: 4 MMOL/L (ref 3.5–5.2)
RBC # BLD AUTO: 4.82 10*6/MM3 (ref 3.77–5.28)
SODIUM SERPL-SCNC: 136 MMOL/L (ref 136–145)
WBC NRBC COR # BLD: 5.26 10*3/MM3 (ref 3.4–10.8)

## 2022-04-06 PROCEDURE — 99231 SBSQ HOSP IP/OBS SF/LOW 25: CPT | Performed by: NURSE PRACTITIONER

## 2022-04-06 PROCEDURE — 25010000002 DEXAMETHASONE PER 1 MG: Performed by: NURSE PRACTITIONER

## 2022-04-06 PROCEDURE — 80048 BASIC METABOLIC PNL TOTAL CA: CPT | Performed by: HOSPITALIST

## 2022-04-06 PROCEDURE — 63710000001 METHYLPREDNISOLONE 4 MG TABLET THERAPY PACK 21 EACH DISP PACK: Performed by: NURSE PRACTITIONER

## 2022-04-06 PROCEDURE — 94799 UNLISTED PULMONARY SVC/PX: CPT

## 2022-04-06 PROCEDURE — 99232 SBSQ HOSP IP/OBS MODERATE 35: CPT

## 2022-04-06 PROCEDURE — 97530 THERAPEUTIC ACTIVITIES: CPT

## 2022-04-06 PROCEDURE — 85025 COMPLETE CBC W/AUTO DIFF WBC: CPT | Performed by: HOSPITALIST

## 2022-04-06 RX ORDER — METHYLPREDNISOLONE 4 MG/1
8 TABLET ORAL
Status: DISCONTINUED | OUTPATIENT
Start: 2022-04-07 | End: 2022-04-06 | Stop reason: HOSPADM

## 2022-04-06 RX ORDER — METHYLPREDNISOLONE 4 MG/1
4 TABLET ORAL
Status: DISCONTINUED | OUTPATIENT
Start: 2022-04-07 | End: 2022-04-06 | Stop reason: HOSPADM

## 2022-04-06 RX ORDER — LOSARTAN POTASSIUM 50 MG/1
50 TABLET ORAL
Qty: 30 TABLET | Refills: 0 | Status: SHIPPED | OUTPATIENT
Start: 2022-04-07 | End: 2022-05-07

## 2022-04-06 RX ORDER — POTASSIUM CHLORIDE 1.5 G/1.77G
20 POWDER, FOR SOLUTION ORAL 2 TIMES DAILY WITH MEALS
Qty: 60 PACKET | Refills: 0 | Status: SHIPPED | OUTPATIENT
Start: 2022-04-06 | End: 2022-05-06

## 2022-04-06 RX ORDER — ATORVASTATIN CALCIUM 10 MG/1
10 TABLET, FILM COATED ORAL NIGHTLY
Qty: 30 TABLET | Refills: 0 | Status: SHIPPED | OUTPATIENT
Start: 2022-04-06 | End: 2022-05-06

## 2022-04-06 RX ORDER — GUAIFENESIN 600 MG/1
600 TABLET, EXTENDED RELEASE ORAL EVERY 12 HOURS SCHEDULED
Qty: 60 TABLET | Refills: 0 | Status: SHIPPED | OUTPATIENT
Start: 2022-04-06 | End: 2022-05-06

## 2022-04-06 RX ORDER — METHYLPREDNISOLONE 4 MG/1
TABLET ORAL
Qty: 1 EACH | Refills: 0 | Status: SHIPPED | OUTPATIENT
Start: 2022-04-06

## 2022-04-06 RX ORDER — METHYLPREDNISOLONE 4 MG/1
8 TABLET ORAL
Status: DISCONTINUED | OUTPATIENT
Start: 2022-04-06 | End: 2022-04-06 | Stop reason: HOSPADM

## 2022-04-06 RX ORDER — METHYLPREDNISOLONE 4 MG/1
4 TABLET ORAL
Status: DISCONTINUED | OUTPATIENT
Start: 2022-04-08 | End: 2022-04-06 | Stop reason: HOSPADM

## 2022-04-06 RX ORDER — METHYLPREDNISOLONE 4 MG/1
8 TABLET ORAL
Status: COMPLETED | OUTPATIENT
Start: 2022-04-06 | End: 2022-04-06

## 2022-04-06 RX ORDER — METHYLPREDNISOLONE 4 MG/1
4 TABLET ORAL
Status: DISCONTINUED | OUTPATIENT
Start: 2022-04-10 | End: 2022-04-06 | Stop reason: HOSPADM

## 2022-04-06 RX ORDER — FUROSEMIDE 20 MG/1
20 TABLET ORAL 2 TIMES DAILY
Qty: 60 TABLET | Refills: 0 | Status: SHIPPED | OUTPATIENT
Start: 2022-04-06 | End: 2022-05-06

## 2022-04-06 RX ORDER — METHYLPREDNISOLONE 4 MG/1
4 TABLET ORAL
Status: COMPLETED | OUTPATIENT
Start: 2022-04-06 | End: 2022-04-06

## 2022-04-06 RX ORDER — METHYLPREDNISOLONE 4 MG/1
4 TABLET ORAL
Status: DISCONTINUED | OUTPATIENT
Start: 2022-04-09 | End: 2022-04-06 | Stop reason: HOSPADM

## 2022-04-06 RX ORDER — METHYLPREDNISOLONE 4 MG/1
4 TABLET ORAL
Status: DISCONTINUED | OUTPATIENT
Start: 2022-04-06 | End: 2022-04-06 | Stop reason: HOSPADM

## 2022-04-06 RX ORDER — METHYLPREDNISOLONE 4 MG/1
4 TABLET ORAL
Status: DISCONTINUED | OUTPATIENT
Start: 2022-04-11 | End: 2022-04-06 | Stop reason: HOSPADM

## 2022-04-06 RX ADMIN — LOSARTAN POTASSIUM 50 MG: 50 TABLET, FILM COATED ORAL at 08:08

## 2022-04-06 RX ADMIN — PANTOPRAZOLE SODIUM 40 MG: 40 TABLET, DELAYED RELEASE ORAL at 06:26

## 2022-04-06 RX ADMIN — CARVEDILOL 3.12 MG: 3.12 TABLET, FILM COATED ORAL at 08:08

## 2022-04-06 RX ADMIN — CLOPIDOGREL 75 MG: 75 TABLET, FILM COATED ORAL at 08:08

## 2022-04-06 RX ADMIN — ASPIRIN 81 MG: 81 TABLET, CHEWABLE ORAL at 08:08

## 2022-04-06 RX ADMIN — LEVOTHYROXINE SODIUM 88 MCG: 0.09 TABLET ORAL at 06:26

## 2022-04-06 RX ADMIN — DEXAMETHASONE SODIUM PHOSPHATE 4 MG: 4 INJECTION, SOLUTION INTRAMUSCULAR; INTRAVENOUS at 00:21

## 2022-04-06 RX ADMIN — POTASSIUM CHLORIDE 20 MEQ: 1.5 POWDER, FOR SOLUTION ORAL at 08:08

## 2022-04-06 RX ADMIN — HYDROXYCHLOROQUINE SULFATE 200 MG: 200 TABLET ORAL at 08:08

## 2022-04-06 RX ADMIN — FUROSEMIDE 20 MG: 20 TABLET ORAL at 08:08

## 2022-04-06 RX ADMIN — GUAIFENESIN 600 MG: 600 TABLET, EXTENDED RELEASE ORAL at 08:08

## 2022-04-06 RX ADMIN — METHYLPREDNISOLONE 4 MG: 4 TABLET ORAL at 12:08

## 2022-04-06 RX ADMIN — DEXAMETHASONE SODIUM PHOSPHATE 4 MG: 4 INJECTION, SOLUTION INTRAMUSCULAR; INTRAVENOUS at 06:27

## 2022-04-06 RX ADMIN — METHYLPREDNISOLONE 8 MG: 4 TABLET ORAL at 11:12

## 2022-04-06 NOTE — PROGRESS NOTES
[facility] NEUROSURGERY PROGRESS NOTE      CC: HD#6 Back pain      Subjective     Interval History: No events reported overnight.  Laying in bed, very pleasant, states that she is feeling much better and feels that she can go back to her assisted living.  He is moving all extremities, with equal strength.  Continues to complain of low back pain with palpation that she states radiates around to the sides.      ROS:  Constitutional: No fever, chills  GI: No nausea, vomiting  MS: back pain  Neuro: No numbness, tingling, or weakness,  balance difficulties-uses a walker  : No difficulty voiding, no incontinence    Objective     Vital signs in last 24 hours:  Temp:  [97.9 °F (36.6 °C)-98.1 °F (36.7 °C)] 97.9 °F (36.6 °C)  Heart Rate:  [61-73] 73  Resp:  [17-18] 17  BP: (102-177)/(50-90) 177/90    Intake/Output this shift:  I/O this shift:  In: 120 [P.O.:120]  Out: -     LABS:  Results from last 7 days   Lab Units 04/06/22  0559 04/05/22  0624 04/04/22  0745   WBC 10*3/mm3 5.26 9.64 8.50   HEMOGLOBIN g/dL 12.7 12.4 10.9*   HEMATOCRIT % 39.7 39.5 34.9   PLATELETS 10*3/mm3 369 322 297     Results from last 7 days   Lab Units 04/06/22  0559 04/05/22  0624 04/04/22  0745 04/03/22  0737 04/02/22  0540 03/31/22  1710   SODIUM mmol/L 136 139 142   < > 139 137   POTASSIUM mmol/L 4.0 4.3 4.1   < > 3.7 4.1   CHLORIDE mmol/L 97* 101 105   < > 105 104   CO2 mmol/L 25.8 31.7* 28.7   < > 24.4 21.0*   BUN mg/dL 28* 18 19   < > 20 25*   CREATININE mg/dL 0.85 0.87 0.84   < > 0.94 0.96   CALCIUM mg/dL 9.4 9.3 8.7   < > 8.3* 9.1   BILIRUBIN mg/dL  --   --   --   --  0.2 0.4   ALK PHOS U/L  --   --   --   --  116 154*   ALT (SGPT) U/L  --   --   --   --  17 23   AST (SGOT) U/L  --   --   --   --  25 34*   GLUCOSE mg/dL 130* 103* 94   < > 91 118*    < > = values in this interval not displayed.     Results from last 7 days   Lab Units 03/31/22  1710   INR  1.04       IMAGING STUDIES:  MRI Lumbar Spine Without Contrast    Result Date:  4/6/2022  1. Since prior lumbar spine MRI from [facility] on 08/24/2020 the patient has developed vertical hairline nondisplaced bilateral sacral alar fractures from S1 to S4 and a horizontal nondisplaced fracture through the superior body of the S3 sacral level and there is exuberant marrow edema tracking into the right and left sacral ala from S1 to S4 and horizontally across the bodies at S2-3 compatible with an acute to subacute H-shaped sacral fracture. There appear to be possible additional nondisplaced fractures of the lateral aspect of the right and left transverse processes of L5. 2. The patient has a dextroscoliotic curvature of the lumbar spine with its apex at the L2-3 lumbar level and lumbar spondylosis that is pretty stable since prior MRI of the lumbar spine 08/24/2020. There is mild narrowing of the left side of the canal at L2-3. At L3-4 there is mild right and mild-to-moderate left facet overgrowth and a 4 mm retrolisthesis of L3 with respect to L4 with uncovered bulging disc material extending diffusely along the posterior superior endplate of L4 and eccentrically protruding along the left posterior superior endplate of L4 where it abuts the ventral aspect of the traversing left L4 nerve root and there is severe narrowing of the thecal sac at L3-4 with diminished spinal fluid bathing the cauda equina at this level. There is spurring and bulging disc material extending into the foramina bilaterally left greater than right and there is mild right and mild-to-moderate left foraminal narrowing at L3-4. 3. At L4-5 there is moderate bilateral facet overgrowth and a 4 mm degenerative anterolisthesis of L4 on L5, mild diffuse posterior disc bulge, and there is mild narrowing of the thecal sac and mild bilateral foraminal narrowing. At L5-S1 there is mild foraminal narrowing.  This report was finalized on 4/6/2022 6:13 AM by Dr. Avinash Angela M.D.        I personally reviewed and interpreted  the patient's report and imaging with Dr. Warren.    Meds reviewed/changed: Yes    Current Facility-Administered Medications:   •  aspirin chewable tablet 81 mg, 81 mg, Oral, Daily, Twin Minor MD, 81 mg at 04/06/22 0808  •  atorvastatin (LIPITOR) tablet 10 mg, 10 mg, Oral, Nightly, Twin Minor MD, 10 mg at 04/05/22 2137  •  carvedilol (COREG) tablet 3.125 mg, 3.125 mg, Oral, BID With Meals, Twin Minor MD, 3.125 mg at 04/06/22 0808  •  clopidogrel (PLAVIX) tablet 75 mg, 75 mg, Oral, Daily, Twin Minor MD, 75 mg at 04/06/22 0808  •  dexamethasone (DECADRON) injection 4 mg, 4 mg, Intravenous, Q6H, Isabelle Narvaez APRN, 4 mg at 04/06/22 0627  •  furosemide (LASIX) tablet 20 mg, 20 mg, Oral, BID, Twin Minor MD, 20 mg at 04/06/22 0808  •  guaiFENesin (MUCINEX) 12 hr tablet 600 mg, 600 mg, Oral, Q12H, Twin Minor MD, 600 mg at 04/06/22 0808  •  HYDROcodone-acetaminophen (NORCO) 5-325 MG per tablet 1 tablet, 1 tablet, Oral, Q6H PRN, Twin Minor MD, 1 tablet at 04/05/22 1347  •  hydroxychloroquine (PLAQUENIL) tablet 200 mg, 200 mg, Oral, Daily, Twin Minor MD, 200 mg at 04/06/22 0808  •  ipratropium-albuterol (DUO-NEB) nebulizer solution 3 mL, 3 mL, Nebulization, Q4H PRN, Twin Minor MD  •  levothyroxine (SYNTHROID, LEVOTHROID) tablet 88 mcg, 88 mcg, Oral, QAM, Twin Minor MD, 88 mcg at 04/06/22 0626  •  lidocaine (LIDODERM) 5 % 2 patch, 2 patch, Transdermal, Q24H, Isabelle Narvaez APRN, 2 patch at 04/05/22 2140  •  losartan (COZAAR) tablet 50 mg, 50 mg, Oral, Q24H, Twin Minor MD, 50 mg at 04/06/22 0808  •  methocarbamol (ROBAXIN) tablet 500 mg, 500 mg, Oral, Q6H PRN, Isabelle Narvaez APRN  •  pantoprazole (PROTONIX) EC tablet 40 mg, 40 mg, Oral, Q AM, Twin Minor MD, 40 mg at 04/06/22 0626  •  PARoxetine (PAXIL) tablet 20 mg, 20 mg, Oral, Nightly, Twin Minor MD, 20 mg at 04/05/22 2137  •  potassium chloride (KLOR-CON) packet 20 mEq, 20 mEq, Oral, BID With Meals, Twin Minor MD, 20 mEq at  "04/06/22 0808      Physical Exam:    General:   Awake, alert, oriented x3. Speech clear with no aphasia  Back:    Pain with palpation to low back  Motor: Normal muscle strength, bulk and tone in upper and lower extremities.  No fasciculations, rigidity, spasticity, or abnormal movements.  Sensation: Normal to light touch  Coordination: Length is equal bilateral lower extremity  Station and Gait:             Normal gait and station.  Able to heel and toe walk  Extremities:   Mild edema to bilateral lower extremities, mid shin down is blue, pulses palpable, skin warm      Assessment/Plan     ASSESSMENT:      Hypoxia    Protrusion of lumbar intervertebral disc    Chronic back pain      PLAN:  D/C decadron  Medrol dose pack  Discussed no surgical intervention with patient, and RN, she is not interested in epidural steroid injection, at this time, as she states that she is ready to go back to the assisted living facility.  Per RN, she states that she will be going back to the skilled nursing side of her current assisted living.  Therefore we will sign off, and continue to be available for any questions or concerns that may arise.  She can see us in follow-up, if continues to have pain.    I discussed the patient's findings and my recommendations with patient, nursing staff and Dr. Warren       LOS: 6 days       Becca Little, APRN  4/6/2022  08:45 EDT    \"Dictated utilizing Dragon dictation\".        "

## 2022-04-06 NOTE — NURSING NOTE
Report called to ASHLI Wilkins. [place], pt's POA at bedside and will transfer patient to facility in private vehicle.     Medications sent to rehab pharmacy. Pt on RA prior to d/c and oxygen saturation is WNL on RA. IV removed intact. Follow-up appointment information given to pt and transferring facility. Pt to be taken out in w/c.

## 2022-04-06 NOTE — PROGRESS NOTES
"                                              LOS: 6 days   Patient Care Team:  Kelsey Prabhakar MD as PCP - General (Internal Medicine)    Chief Complaint:  F/up abnormal CT chest    Subjective   Interval History  I reviewed the admission note, progress notes, PMH, PSH, Family hx, social history, imagings and prior records on this admission, summarized the finding in my note and formulated a transition of care plan.      She reported no cough or shortness of breath.  She said that she never had respiratory issues.  She is currently on 1 L oxygen but her SPO2 is 96%.  Back pain has improved.    REVIEW OF SYSTEMS:   CARDIOVASCULAR: No chest pain, chest pressure or chest discomfort. No palpitations or edema.   GASTROINTESTINAL: No anorexia, nausea, vomiting or diarrhea. No abdominal pain.  CONSTITUTIONAL: No fever or chills.     Ventilator/Non-Invasive Ventilation Settings (From admission, onward)            None                Physical Exam:     Vital Signs  Temp:  [97.9 °F (36.6 °C)-98.1 °F (36.7 °C)] 98 °F (36.7 °C)  Heart Rate:  [68-73] 68  Resp:  [17-18] 17  BP: (119-177)/(64-90) 135/74    Intake/Output Summary (Last 24 hours) at 4/6/2022 1300  Last data filed at 4/6/2022 0800  Gross per 24 hour   Intake 600 ml   Output 400 ml   Net 200 ml     Flowsheet Rows    Flowsheet Row First Filed Value   Admission Height 154.9 cm (61\") Documented at 03/31/2022 1728   Admission Weight 50.8 kg (112 lb) Documented at 03/31/2022 1728          PPE used per hospital policy    General Appearance:   Alert, cooperative, in no acute distress   ENMT:  Mallampati score 3, moist mucous membrane   Eyes:  Pupils equal and reactive to light. EOMI   Neck:   Trachea midline. No thyromegaly.   Lungs:    Coarse crackles bilaterally mostly at the bases.  No wheezing.  No labored breathing.    Heart:   Regular rhythm and normal rate, normal S1 and S2, no         murmur   Skin:   No rash or ecchymosis   Abdomen:     Soft. No tenderness. No HSM. "   Neuro/psych:  Conscious, alert, oriented x3. Strength 5/5 in upper and lower  ext.  Appropriate mood and affect   Extremities:  No cyanosis, clubbing or edema.  Warm extremities and well-perfused          Results Review:        Results from last 7 days   Lab Units 04/06/22  0559 04/05/22  0624 04/04/22  0745   SODIUM mmol/L 136 139 142   POTASSIUM mmol/L 4.0 4.3 4.1   CHLORIDE mmol/L 97* 101 105   CO2 mmol/L 25.8 31.7* 28.7   BUN mg/dL 28* 18 19   CREATININE mg/dL 0.85 0.87 0.84   GLUCOSE mg/dL 130* 103* 94   CALCIUM mg/dL 9.4 9.3 8.7     Results from last 7 days   Lab Units 04/02/22  0540 03/31/22  1710   TROPONIN T ng/mL <0.010 0.015     Results from last 7 days   Lab Units 04/06/22  0559 04/05/22  0624 04/04/22  0745   WBC 10*3/mm3 5.26 9.64 8.50   HEMOGLOBIN g/dL 12.7 12.4 10.9*   HEMATOCRIT % 39.7 39.5 34.9   PLATELETS 10*3/mm3 369 322 297     Results from last 7 days   Lab Units 03/31/22  1710   INR  1.04   APTT seconds 29.9     Results from last 7 days   Lab Units 04/03/22  0737   PROBNP pg/mL 1,835.0*                           I reviewed the patient's new clinical results.        Medication Review:   aspirin, 81 mg, Oral, Daily  atorvastatin, 10 mg, Oral, Nightly  carvedilol, 3.125 mg, Oral, BID With Meals  clopidogrel, 75 mg, Oral, Daily  furosemide, 20 mg, Oral, BID  guaiFENesin, 600 mg, Oral, Q12H  hydroxychloroquine, 200 mg, Oral, Daily  levothyroxine, 88 mcg, Oral, QAM  lidocaine, 2 patch, Transdermal, Q24H  losartan, 50 mg, Oral, Q24H  methylPREDNISolone, 4 mg, Oral, After Dinner  [START ON 4/7/2022] methylPREDNISolone, 4 mg, Oral, TID Around Food  [START ON 4/8/2022] methylPREDNISolone, 4 mg, Oral, 4x Daily Taper  [START ON 4/9/2022] methylPREDNISolone, 4 mg, Oral, TID Around Food  [START ON 4/10/2022] methylPREDNISolone, 4 mg, Oral, Before Breakfast  [START ON 4/10/2022] methylPREDNISolone, 4 mg, Oral, Tonight  [START ON 4/11/2022] methylPREDNISolone, 4 mg, Oral, Before  Breakfast  methylPREDNISolone, 8 mg, Oral, Tonight  [START ON 4/7/2022] methylPREDNISolone, 8 mg, Oral, Tonight  pantoprazole, 40 mg, Oral, Q AM  PARoxetine, 20 mg, Oral, Nightly  potassium chloride, 20 mEq, Oral, BID With Meals             Diagnostic imaging:  I personally and independently reviewed the following images:  CT chest 3/31/2022: Mild subpleural reticular infiltrates with possible honeycombing.  Minimal emphysematous changes in the upper lobes.  Mosaic pattern    Assessment   1. Acute hypoxic respiratory failure  2. Pulmonary vascular congestion  3. Acute on chronic diastolic CHF  4. Elevated BNP  5. Bilateral pleural effusion  6. Small bilateral lower lobe atelectasis  7. 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 RA      All problems new to me    Plan     · PFT as outpatient.  HRCT chest supine and prone  · Incentive spirometry  · Walking oximetry prior to discharge  · Titrate wean off oxygen.  Target SPO2 >90%.  She is probably does not require it at rest.  · Initiate pharmacological DVT prophylaxis if patient remains in the hospital    Discussed with her son at bedside    Okay to discharge from pulmonary perspective.  We will follow as needed only.  Follow-up with Dr. Ronnie Melvin MD  04/06/22  13:00 EDT          This note was dictated utilizing Anadys dictation

## 2022-04-06 NOTE — THERAPY TREATMENT NOTE
Patient Name: Verona Avila  : 1934    MRN: [id number]                              Today's Date: 2022       Admit Date: 3/31/2022    Visit Dx:     ICD-10-CM ICD-9-CM   1. Hypoxia  R09.02 799.02   2. Acute bilateral low back pain without sciatica  M54.50 724.2     338.19     Patient Active Problem List   Diagnosis   • Syncope   • Hypoxia   • Protrusion of lumbar intervertebral disc   • Chronic back pain     Past Medical History:   Diagnosis Date   • Arthritis    • Atherosclerosis    • Depression    • Difficulty walking    • Disease of thyroid gland    • Hypertension    • Muscle weakness    • Spinal stenosis      Past Surgical History:   Procedure Laterality Date   • THYROID SURGERY     • TONSILLECTOMY        General Information     Row Name 22 1507          Physical Therapy Time and Intention    Document Type therapy note (daily note)  -     Mode of Treatment individual therapy;physical therapy  -     Row Name 22 1507          General Information    Patient Profile Reviewed yes  -AG     Existing Precautions/Restrictions fall;oxygen therapy device and L/min  -AG           User Key  (r) = Recorded By, (t) = Taken By, (c) = Cosigned By    Initials Name Provider Type    AG Reyna Mahan, GREG Physical Therapist               Mobility     Row Name 22 1507          Bed Mobility    Bed Mobility supine-sit  -AG     Supine-Sit [place] (Bed Mobility) supervision  -AG     Assistive Device (Bed Mobility) bed rails;head of bed elevated  -AG     Comment, (Bed Mobility) cuing no hand placement and sequencing, require extra time to complete task  -AG     Row Name 22 1507          Bed-Chair Transfer    Bed-Chair [place] (Transfers) contact guard  -AG     Assistive Device (Bed-Chair Transfers) walker, front-wheeled  -AG     Comment, (Bed-Chair Transfer) cuing on hand placement  -AG     Row Name 22 1507          Sit-Stand Transfer    Sit-Stand [place] (Transfers) contact guard   -AG     Assistive Device (Sit-Stand Transfers) walker, front-wheeled  -AG     Row Name 04/06/22 1507          Gait/Stairs (Locomotion)    [place] Level (Gait) contact guard  -AG     Assistive Device (Gait) walker, front-wheeled  -AG     Distance in Feet (Gait) 40ft  -AG     Comment, (Gait/Stairs) --  -AG           User Key  (r) = Recorded By, (t) = Taken By, (c) = Cosigned By    Initials Name Provider Type    Reyna Cazares, PT Physical Therapist               Obj/Interventions    No documentation.                Goals/Plan    No documentation.                Clinical Impression     Row Name 04/06/22 1509          Plan of Care Review    Outcome Evaluation pt presents with increaed activity tolerance with ability to ambulate up to 40ft with FWW with CGA. Pt will continue to benefit from acute skilled PT to increase overall strength, balance, endurance/activity tolerance and maximize independence with least restrictive AD. recommend SNF placement at this time prior to return independently at [facility].  -AG     Row Name 04/06/22 1509          Therapy Assessment/Plan (PT)    Rehab Potential (PT) good, to achieve stated therapy goals  -AG     Criteria for Skilled Interventions Met (PT) skilled treatment is necessary  -AG     Row Name 04/06/22 1509          Vital Signs    O2 Delivery Pre Treatment supplemental O2  -AG     O2 Delivery Intra Treatment supplemental O2  -AG     O2 Delivery Post Treatment supplemental O2  -AG     Row Name 04/06/22 1500          Positioning and Restraints    Pre-Treatment Position in bed  -AG     Post Treatment Position chair  -AG     In Chair notified nsg;call light within reach;encouraged to call for assist  -AG           User Key  (r) = Recorded By, (t) = Taken By, (c) = Cosigned By    Initials Name Provider Type    Reyna Cazares, PT Physical Therapist               Outcome Measures     Row Name 04/06/22 1518          How much help from another person do you currently need...     Turning from your back to your side while in flat bed without using bedrails? 4  -AG     Moving from lying on back to sitting on the side of a flat bed without bedrails? 3  -AG     Moving to and from a bed to a chair (including a wheelchair)? 3  -AG     Standing up from a chair using your arms (e.g., wheelchair, bedside chair)? 3  -AG     Climbing 3-5 steps with a railing? 1  -AG     To walk in hospital room? 3  -AG     AM-PAC 6 Clicks Score (PT) 17  -AG     Row Name 04/06/22 1510          Functional Assessment    Outcome Measure Options AM-PAC 6 Clicks Basic Mobility (PT)  -AG           User Key  (r) = Recorded By, (t) = Taken By, (c) = Cosigned By    Initials Name Provider Type    AG Reyna Mahan, PT Physical Therapist                             Physical Therapy Education                 Title: PT OT SLP Therapies (Done)     Topic: Physical Therapy (Done)     Point: Mobility training (Done)     Learning Progress Summary           Patient Acceptance, E, VU,NR by DJ at 4/5/2022 1137    Acceptance, E, VU,NR by DJ at 4/4/2022 1453    Acceptance, E, VU by SB at 4/4/2022 1448    Acceptance, E, VU,NR by KH at 4/2/2022 1632                   Point: Home exercise program (Done)     Learning Progress Summary           Patient Acceptance, E, VU,NR by DJ at 4/4/2022 1453    Acceptance, E, VU by SB at 4/4/2022 1448                   Point: Body mechanics (Done)     Learning Progress Summary           Patient Acceptance, E, VU,NR by DJ at 4/5/2022 1137    Acceptance, E, VU,NR by DJ at 4/4/2022 1453    Acceptance, E, VU by SB at 4/4/2022 1448    Acceptance, E, VU,NR by KH at 4/2/2022 1632                   Point: Precautions (Done)     Learning Progress Summary           Patient Acceptance, E, VU,NR by DJ at 4/5/2022 1137    Acceptance, E, VU,NR by DJ at 4/4/2022 1453    Acceptance, E, VU by SB at 4/4/2022 1448                               User Key     Initials Effective Dates Name Provider Type Discipline    SB 06/16/21 -   Anna Eldridge, RN Registered Nurse Nurse    DJ 10/25/19 -  Komal Leblanc, PT Physical Therapist PT     11/03/21 -  Tatiana Madera PT Physical Therapist PT              PT Recommendation and Plan     Outcome Evaluation: pt presents with increaed activity tolerance with ability to ambulate up to 40ft with FWW with CGA. Pt will continue to benefit from acute skilled PT to increase overall strength, balance, endurance/activity tolerance and maximize independence with least restrictive AD. recommend SNF placement at this time prior to return independently at [facility].     Time Calculation:    PT Charges     Row Name 04/06/22 1510             Time Calculation    Start Time 1330  -AG      Stop Time 1347  -AG      Time Calculation (min) 17 min  -AG      PT Received On 04/06/22  -AG      PT - Next Appointment 04/07/22  -AG      PT Goal Re-Cert Due Date 04/16/22  -AG              Time Calculation- PT    Total Timed Code Minutes- PT 17 minute(s)  -AG              Timed Charges    30308 - PT Therapeutic Activity Minutes 17  -AG              Total Minutes    Timed Charges Total Minutes 17  -AG       Total Minutes 17  -AG            User Key  (r) = Recorded By, (t) = Taken By, (c) = Cosigned By    Initials Name Provider Type    AG Reyna Mahan, PT Physical Therapist              Therapy Charges for Today     Code Description Service Date Service Provider Modifiers Qty    29405858830 HC PT THERAPEUTIC ACT EA 15 MIN 4/6/2022 Reyna Mahan, PT GP 1          PT G-Codes  Outcome Measure Options: AM-PAC 6 Clicks Basic Mobility (PT)  AM-PAC 6 Clicks Score (PT): 17    Reyna Mahan PT  4/6/2022

## 2022-04-06 NOTE — PROGRESS NOTES
"Daily progress note    Chief complaint  Doing better    No new complaints  Wants to be discharged  Denies chest pain shortness of breath palpitation    Family at bedside    History of present illness  87-year-old white female with history of osteoarthritis rheumatoid arthritis degenerative disease hypertension hyperlipidemia and gastroesophageal reflux disease who is well-known to our service presented to [facility] emergency room with severe back pain as she fell 3 weeks ago.  Patient denies any radiation of the pain.  Patient work-up in ER revealed hypoxia but she denies any chest pain shortness of breath palpitation.  Patient also denies any fever cough congestion night sweats weight loss or weight gain.  Patient admitted for management.    REVIEW OF SYSTEMS  Unremarkable except back pain     PHYSICAL EXAM  Blood pressure 135/74, pulse 75, temperature 98 °F (36.7 °C), temperature source Oral, resp. rate 17, height 154.9 cm (61\"), weight 47.6 kg (104 lb 15 oz), SpO2 95 %.    General: No acute distress.  HENT: NCAT, PERRL, Nares patent.  Eyes: no scleral icterus.  Neck: trachea midline, no ROM limitations.  CV: regular rhythm, regular rate.  Respiratory: normal effort, inspiratory crackles at bilateral lung bases.  Abdomen: soft, nondistended nontender bowel sounds positive  Musculoskeletal: no deformity.  Lumbar spine: No step-offs or deformities, no midline tenderness to palpation, bilateral paraspinal tenderness to palpation.  5 out of 5 strength with sensation intact to light touch bilateral lower extremities.  Neuro: alert, moves all extremities, follows commands.  Skin: warm, dry.  Discoloration of bilateral legs which patient states is chronic, no signs of infection, nonpitting edema bilateral lower extremities     LAB RESULTS  Lab Results (last 24 hours)     Procedure Component Value Units Date/Time    Basic Metabolic Panel [[id number]]  (Abnormal) Collected: 04/06/22 0559    Specimen: Blood " Updated: 04/06/22 0706     Glucose 130 mg/dL      BUN 28 mg/dL      Creatinine 0.85 mg/dL      Sodium 136 mmol/L      Potassium 4.0 mmol/L      Chloride 97 mmol/L      CO2 25.8 mmol/L      Calcium 9.4 mg/dL      BUN/Creatinine Ratio 32.9     Anion Gap 13.2 mmol/L      eGFR 66.4 mL/min/1.73      Comment: National Kidney Foundation and American Society of Nephrology (ASN) Task Force recommended calculation based on the Chronic Kidney Disease Epidemiology Collaboration (CKD-EPI) equation refit without adjustment for race.       Narrative:      GFR Normal >60  Chronic Kidney Disease <60  Kidney Failure <15      CBC & Differential [[id number]]  (Abnormal) Collected: 04/06/22 0559    Specimen: Blood Updated: 04/06/22 0643    Narrative:      The following orders were created for panel order CBC & Differential.  Procedure                               Abnormality         Status                     ---------                               -----------         ------                     CBC Auto Differential[[id number]]        Abnormal            Final result                 Please view results for these tests on the individual orders.    CBC Auto Differential [[id number]]  (Abnormal) Collected: 04/06/22 0559    Specimen: Blood Updated: 04/06/22 0643     WBC 5.26 10*3/mm3      RBC 4.82 10*6/mm3      Hemoglobin 12.7 g/dL      Hematocrit 39.7 %      MCV 82.4 fL      MCH 26.3 pg      MCHC 32.0 g/dL      RDW 13.6 %      RDW-SD 40.3 fl      MPV 10.4 fL      Platelets 369 10*3/mm3      Neutrophil % 79.4 %      Lymphocyte % 17.3 %      Monocyte % 2.7 %      Eosinophil % 0.0 %      Basophil % 0.0 %      Immature Grans % 0.6 %      Neutrophils, Absolute 4.18 10*3/mm3      Lymphocytes, Absolute 0.91 10*3/mm3      Monocytes, Absolute 0.14 10*3/mm3      Eosinophils, Absolute 0.00 10*3/mm3      Basophils, Absolute 0.00 10*3/mm3      Immature Grans, Absolute 0.03 10*3/mm3      nRBC 0.0 /100 WBC         Imaging Results (Last 24 Hours)      Procedure Component Value Units Date/Time    MRI Lumbar Spine Without Contrast [[id number]] Collected: 04/05/22 1536     Updated: 04/06/22 0616    Narrative:      MRI OF THE LUMBAR SPINE WITHOUT CONTRAST 04/05/2022     CLINICAL HISTORY: Patient fell 4 months ago, has chronic low back pain.     TECHNIQUE: Sagittal T1, proton density and fat-suppressed T2-weighted  images were obtained of the lumbar spine. In addition axial T2-weighted  images were obtained from the inferior body of T11 down to the superior  body of the S2 sacral level and thin cut axial T1-weighted images were  obtained angled through the interspaces from T12 to S1.     COMPARISON: This is correlated to prior lumbar spine plain film series  from [facility] 03/31/2022 and a previous MRI of the  lumbar spine on 08/24/2020.     FINDINGS: The distal thoracic cord and the conus are normal in signal  intensity. The conus terminates at the L1-2 interspace level which is  normal.     At T11-12 the posterior disc margin and facets are normal with no canal  or foraminal narrowing.     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 Along the inner margin of the  dextroscoliotic curve there is severe narrowing of the left-sided disc  space and large left lateral marginal bridging osteophytes at L2-3.  There is also prominent narrowing of the left side of the disc space and  left-sided degenerative marrow signal changes at L3-4.     At T12-L1 there is disc space narrowing and degenerative endplate  changes most pronounced in the right side of the endplates with some  mild degenerative marrow edema in the right side of the endplates. The  posterior disc margin and facets are normal and there is no canal or  left foraminal narrowing. There is mild right bony foraminal narrowing.     At L1-2 there is minimal diffuse posterior disc bulge and minimal facet  overgrowth. There is  essentially no canal or foraminal narrowing.     At L2-3 there is minimal facet overgrowth. There is disc space narrowing  and degenerative endplate changes most pronounced at the left side of  the endplates and slight eccentric spurring and bulging disc material  left posterior laterally.  There is mild narrowing of left side of the  canal. There is no right foraminal narrowing. There is eccentric  spurring and bulging disc material into the inferior left foramen and  more prominent to the far left laterally and there is mild left bony  foraminal narrowing.     At L3-4 there is some eccentric disc space narrowing and degenerative  endplate changes at the left side of the disc space with some minimal  degenerative marrow edema left lateral endplates. There is mild right  and mild-to-moderate left facet overgrowth and there is a 4 mm  retrolisthesis of L3 with respect to L4 with uncovering of the posterior  inferior aspect of the L3-4 disc space and disc material diffusely  bulging along the posterior superior endplate of L4 eccentrically  protruding along the left posterior superior body and endplate of L4.  There is severe narrowing of the thecal sac and slight diminished spinal  fluid bathing the cauda equina at the L3-4 level. There is some left  lateral recess narrowing with uncovered bulging or protruding disc  material extending along the left posterior superior endplate of L4  abutting the ventral aspect of the traversing left L4 nerve root. There  is spurring and bulging disc material into the neural foramina left  greater than right and there is mild right and mild-to-moderate left  foraminal narrowing.     At L4-5 there is moderate bilateral facet overgrowth. There is a 4 mm  degenerative anterolisthesis of L4 on L5, mild disc space narrowing and  disc desiccation, and mild diffuse posterior disc bulge. There is mild  narrowing of the thecal sac and there is mild bilateral foraminal  narrowing.     At  L5-S1 there is mild left and moderate right facet overgrowth and some  fluid in the facet joints. Minimal posterior disc bulge. There is no  canal or lateral recess narrowing. There is mild foraminal narrowing.     There is abnormal T1 low signal, T2 high signal in the right and left  sacral ala from the S1 to S4 sacral levels and there is a horizontal  area of bone marrow edema tracking throughout the superior body of S3  into the inferior body of S2 with horizontal areas of linear signal loss  in the superior body of the S3 sacral level and I think this is  compatible with marrow edema associated with an acute to subacute  H-shaped sacral fracture with vertical fracture of the right and left  sacral ala on horizontal fracture superior body of the S3 sacral level.  There may be fractures that are nondisplaced at the distal L5 transverse  processes bilaterally. This a new finding when compared to MRI  08/24/2020. Bilateral foraminal narrowing. The remainder of the lumbar  spine MRI is unremarkable and the results were communicated to Twin Minor MD by telephone 04/05/2022 at 3 PM.       Impression:      1. Since prior lumbar spine MRI from [facility] on  08/24/2020 the patient has developed vertical hairline nondisplaced  bilateral sacral alar fractures from S1 to S4 and a horizontal  nondisplaced fracture through the superior body of the S3 sacral level  and there is exuberant marrow edema tracking into the right and left  sacral ala from S1 to S4 and horizontally across the bodies at S2-3  compatible with an acute to subacute H-shaped sacral fracture. There  appear to be possible additional nondisplaced fractures of the lateral  aspect of the right and left transverse processes of L5.  2. The patient has a dextroscoliotic curvature of the lumbar spine with  its apex at the L2-3 lumbar level and lumbar spondylosis that is pretty  stable since prior MRI of the lumbar spine 08/24/2020. There is  mild  narrowing of the left side of the canal at L2-3. At L3-4 there is mild  right and mild-to-moderate left facet overgrowth and a 4 mm  retrolisthesis of L3 with respect to L4 with uncovered bulging disc  material extending diffusely along the posterior superior endplate of L4  and eccentrically protruding along the left posterior superior endplate  of L4 where it abuts the ventral aspect of the traversing left L4 nerve  root and there is severe narrowing of the thecal sac at L3-4 with  diminished spinal fluid bathing the cauda equina at this level. There is  spurring and bulging disc material extending into the foramina  bilaterally left greater than right and there is mild right and  mild-to-moderate left foraminal narrowing at L3-4.  3. At L4-5 there is moderate bilateral facet overgrowth and a 4 mm  degenerative anterolisthesis of L4 on L5, mild diffuse posterior disc  bulge, and there is mild narrowing of the thecal sac and mild bilateral  foraminal narrowing. At L5-S1 there is mild foraminal narrowing.     This report was finalized on 4/6/2022 6:13 AM by Dr. Avinash Angela M.D.                ECG 12 Lead  Component   Ref Range & Units 3/31/22 1736 11/20/21 0628 11/18/21 1414   QT Interval   ms 463  403  415              HEART RATE= 71  bpm  RR Interval= 845  ms  DE Interval= 145  ms  P Horizontal Axis= -26  deg  P Front Axis= 68  deg  QRSD Interval= 134  ms  QT Interval= 463  ms  QRS Axis= -7  deg  T Wave Axis= 59  deg  - ABNORMAL ECG -  Sinus rhythm  Left atrial enlargement  Right bundle branch block  No change from previous tracing             Current Facility-Administered Medications:   •  aspirin chewable tablet 81 mg, 81 mg, Oral, Daily, Twin Minor MD, 81 mg at 04/06/22 0808  •  atorvastatin (LIPITOR) tablet 10 mg, 10 mg, Oral, Nightly, Twin Minor MD, 10 mg at 04/05/22 2137  •  carvedilol (COREG) tablet 3.125 mg, 3.125 mg, Oral, BID With Meals, Twin Minor MD, 3.125 mg at 04/06/22 0808  •   clopidogrel (PLAVIX) tablet 75 mg, 75 mg, Oral, Daily, Twin Minor MD, 75 mg at 04/06/22 0808  •  furosemide (LASIX) tablet 20 mg, 20 mg, Oral, BID, Twin Minor MD, 20 mg at 04/06/22 0808  •  guaiFENesin (MUCINEX) 12 hr tablet 600 mg, 600 mg, Oral, Q12H, Twin Minor MD, 600 mg at 04/06/22 0808  •  HYDROcodone-acetaminophen (NORCO) 5-325 MG per tablet 1 tablet, 1 tablet, Oral, Q6H PRN, Twin Minor MD, 1 tablet at 04/05/22 1347  •  hydroxychloroquine (PLAQUENIL) tablet 200 mg, 200 mg, Oral, Daily, Twin Minor MD, 200 mg at 04/06/22 0808  •  ipratropium-albuterol (DUO-NEB) nebulizer solution 3 mL, 3 mL, Nebulization, Q4H PRN, Twin Minor MD  •  levothyroxine (SYNTHROID, LEVOTHROID) tablet 88 mcg, 88 mcg, Oral, QAM, Twin Minor MD, 88 mcg at 04/06/22 0626  •  lidocaine (LIDODERM) 5 % 2 patch, 2 patch, Transdermal, Q24H, Isabelle Narvaez APRN, 2 patch at 04/05/22 2140  •  losartan (COZAAR) tablet 50 mg, 50 mg, Oral, Q24H, Twin Minor MD, 50 mg at 04/06/22 0808  •  methocarbamol (ROBAXIN) tablet 500 mg, 500 mg, Oral, Q6H PRN, Isabelle Narvaez APRN  •  methylPREDNISolone (MEDROL) dose pack 4 mg, 4 mg, Oral, After Dinner, Becca Little APRN  •  [START ON 4/7/2022] methylPREDNISolone (MEDROL) dose pack 4 mg, 4 mg, Oral, TID Around Food, Becca Little APRN  •  [START ON 4/8/2022] methylPREDNISolone (MEDROL) dose pack 4 mg, 4 mg, Oral, 4x Daily Taper, Becca Little APRN  •  [START ON 4/9/2022] methylPREDNISolone (MEDROL) dose pack 4 mg, 4 mg, Oral, TID Around Food, Becca Little APRN  •  [START ON 4/10/2022] methylPREDNISolone (MEDROL) dose pack 4 mg, 4 mg, Oral, Before Breakfast, Becca Little APRN  •  [START ON 4/10/2022] methylPREDNISolone (MEDROL) dose pack 4 mg, 4 mg, Oral, TonightSidney Lana K, APRN  •  [START ON 4/11/2022] methylPREDNISolone (MEDROL) dose pack 4 mg, 4 mg, Oral, Before Breakfast, Becca Little APRN  •  methylPREDNISolone (MEDROL) dose pack 8 mg, 8 mg, Oral, TonSidney iverson Lana K, APRN  •   [START ON 4/7/2022] methylPREDNISolone (MEDROL) dose pack 8 mg, 8 mg, Oral, Nataliia, Becca Little, APRN  •  pantoprazole (PROTONIX) EC tablet 40 mg, 40 mg, Oral, Q AM, Twin Booker MD, 40 mg at 04/06/22 0626  •  PARoxetine (PAXIL) tablet 20 mg, 20 mg, Oral, Nightly, Twin Booker MD, 20 mg at 04/05/22 2137  •  potassium chloride (KLOR-CON) packet 20 mEq, 20 mEq, Oral, BID With Meals, Twin Booker MD, 20 mEq at 04/06/22 0808     ASSESSMENT  Acute hypoxic respiratory failure  New onset congestive heart failure  Hypertension  Hyperlipidemia  Hypothyroidism  Osteoarthritis  Rheumatoid arthritis  Degenerative disc disease/lumbar stenosis disc bulge spondylosis  Bilateral sacral alar fractures  Depression  Gastroesophageal reflux disease    PLAN  Discharge to subacute rehab  Discharge summary dictated    TWIN BOOKER MD

## 2022-04-06 NOTE — DISCHARGE SUMMARY
Discharge summary    Date of admission 3/31/2022  Date of discharge 4/6/2022    Final diagnosis  Acute hypoxic respiratory failure  New onset diastolic congestive heart failure  Hypertension  Hyperlipidemia  Hypothyroidism  Osteoarthritis  Rheumatoid arthritis  Degenerative disc disease/lumbar stenosis disc bulge spondylosis  Bilateral sacral alar fractures  Depression  Gastroesophageal reflux disease    Discharge medications    Current Facility-Administered Medications:   •  aspirin chewable tablet 81 mg, 81 mg, Oral, Daily, Twin Minor MD, 81 mg at 04/06/22 0808  •  atorvastatin (LIPITOR) tablet 10 mg, 10 mg, Oral, Nightly, Twin Minor MD, 10 mg at 04/05/22 2137  •  carvedilol (COREG) tablet 3.125 mg, 3.125 mg, Oral, BID With Meals, Twin Minor MD, 3.125 mg at 04/06/22 0808  •  clopidogrel (PLAVIX) tablet 75 mg, 75 mg, Oral, Daily, Twin Minor MD, 75 mg at 04/06/22 0808  •  furosemide (LASIX) tablet 20 mg, 20 mg, Oral, BID, Twin Minor MD, 20 mg at 04/06/22 0808  •  guaiFENesin (MUCINEX) 12 hr tablet 600 mg, 600 mg, Oral, Q12H, Twin Minor MD, 600 mg at 04/06/22 0808  •  HYDROcodone-acetaminophen (NORCO) 5-325 MG per tablet 1 tablet, 1 tablet, Oral, Q6H PRN, Twin Minor MD, 1 tablet at 04/05/22 1347  •  hydroxychloroquine (PLAQUENIL) tablet 200 mg, 200 mg, Oral, Daily, Twin Minor MD, 200 mg at 04/06/22 0808  •  levothyroxine (SYNTHROID, LEVOTHROID) tablet 88 mcg, 88 mcg, Oral, QAM, Twin Minor MD, 88 mcg at 04/06/22 0626  •  losartan (COZAAR) tablet 50 mg, 50 mg, Oral, Q24H, Twin Minor MD, 50 mg at 04/06/22 0808  •  methylPREDNISolone (MEDROL) dose pack 4 mg, 4 mg, Oral, After Dinner, Becca Little, ADAIR  •  [START ON 4/7/2022] methylPREDNISolone (MEDROL) dose pack 4 mg, 4 mg, Oral, TID Around Food, Becca Little APRN  •  [START ON 4/8/2022] methylPREDNISolone (MEDROL) dose pack 4 mg, 4 mg, Oral, 4x Daily Taper, Becca Little APRN  •  [START ON 4/9/2022] methylPREDNISolone (MEDROL) dose pack 4  mg, 4 mg, Oral, TID Around Food, Wilmer Littlea K, APRN  •  [START ON 4/10/2022] methylPREDNISolone (MEDROL) dose pack 4 mg, 4 mg, Oral, Before Breakfast, Wilmer Littlea K, APRN  •  [START ON 4/10/2022] methylPREDNISolone (MEDROL) dose pack 4 mg, 4 mg, Oral, Tonight, Sidney, Becca K, APRN  •  [START ON 4/11/2022] methylPREDNISolone (MEDROL) dose pack 4 mg, 4 mg, Oral, Before Breakfast, Sidney, Becca K, APRN  •  methylPREDNISolone (MEDROL) dose pack 8 mg, 8 mg, Oral, Tonight, Sidney, Becca K, APRN  •  [START ON 4/7/2022] methylPREDNISolone (MEDROL) dose pack 8 mg, 8 mg, Oral, Tonight, Sidney, Becca K, APRN  •  pantoprazole (PROTONIX) EC tablet 40 mg, 40 mg, Oral, Q AM, Twin Minor MD, 40 mg at 04/06/22 0626  •  PARoxetine (PAXIL) tablet 20 mg, 20 mg, Oral, Nightly, Twin Minor MD, 20 mg at 04/05/22 2137  •  potassium chloride (KLOR-CON) packet 20 mEq, 20 mEq, Oral, BID With Meals, Twin Minor MD, 20 mEq at 04/06/22 0808     Consults obtained  Cardiology  Pulmonary  Neurosurgery    Stress Cardiolite which showed inferior wall and lateral wall ischemia medical management  2D echo showed ejection fraction 59%    Hospital course  87 white female with history of osteoarthritis rheumatoid arthritis degenerative disease hypertension hyperlipidemia admitted to emergency room with back pain and work-up in ER revealed hypoxia and new onset congestive heart failure.  Patient admitted treated with diuretics and her hypoxia improved and further evaluated by cardiology and pulmonary.  Patient stress Cardiolite is positive for ischemia but medical management recommended.  Patient 2D echo unremarkable with ejection fraction 59%.  After cardiopulmonary stabilization she underwent MRI of the back pain which showed bilateral sacral alar fractures lumbar stenosis disc bulge spondylosis and severe degenerative disease.  After discussion with patient and family the does not want epidural or any intervention at this time.  Patient started on steroids  and pain management and recommend physical therapy which she agreed.  Patient will be discharged to subacute rehab for PT OT nursing care on Medrol Dosepak.  Patient remained DNR throughout hospital course.    Discharge diet regular    Activity per PT OT    Medication as above    Further care per accepting physician at subacute rehab and follow-up with cardiology pulmonary and neurosurgery per the instruction and take medication as directed.    JONNY BOOKER MD

## 2022-04-06 NOTE — PROGRESS NOTES
[facility]  Cardiology Progress Note    Patient Identification:  Name: Verona Avila  Age: 87 y.o.  Sex: female  :  1934  MRN: [id number]                 Follow Up / Chief Complaint: Follow up for CHF    Interval History: Stress test yesterday positive, medical manage.  Echo EF 58%.       Subjective: No chest pain, shortness of breath improved    Objective:    Past Medical History:  Past Medical History:   Diagnosis Date   • Arthritis    • Atherosclerosis    • Depression    • Difficulty walking    • Disease of thyroid gland    • Hypertension    • Muscle weakness    • Spinal stenosis      Past Surgical History:  Past Surgical History:   Procedure Laterality Date   • THYROID SURGERY     • TONSILLECTOMY          Social History:   Social History     Tobacco Use   • Smoking status: Former Smoker   • Smokeless tobacco: Never Used   Substance Use Topics   • Alcohol use: No      Family History:  History reviewed. No pertinent family history.       Allergies:  No Known Allergies  Scheduled Meds:  aspirin, 81 mg, Daily  atorvastatin, 10 mg, Nightly  carvedilol, 3.125 mg, BID With Meals  clopidogrel, 75 mg, Daily  furosemide, 20 mg, BID  guaiFENesin, 600 mg, Q12H  hydroxychloroquine, 200 mg, Daily  levothyroxine, 88 mcg, QAM  lidocaine, 2 patch, Q24H  losartan, 50 mg, Q24H  methylPREDNISolone, 4 mg, After Lunch  methylPREDNISolone, 4 mg, After Dinner  [START ON 2022] methylPREDNISolone, 4 mg, TID Around Food  [START ON 2022] methylPREDNISolone, 4 mg, 4x Daily Taper  [START ON 2022] methylPREDNISolone, 4 mg, TID Around Food  [START ON 4/10/2022] methylPREDNISolone, 4 mg, Before Breakfast  [START ON 4/10/2022] methylPREDNISolone, 4 mg, Tonight  [START ON 2022] methylPREDNISolone, 4 mg, Before Breakfast  methylPREDNISolone, 8 mg, Before Breakfast  methylPREDNISolone, 8 mg, Tonight  [START ON 2022] methylPREDNISolone, 8 mg, Tonight  pantoprazole, 40 mg, Q AM  PARoxetine, 20 mg,  "Nightly  potassium chloride, 20 mEq, BID With Meals            INTAKE AND OUTPUT:    Intake/Output Summary (Last 24 hours) at 4/6/2022 0954  Last data filed at 4/6/2022 0800  Gross per 24 hour   Intake 600 ml   Output 400 ml   Net 200 ml       ROS  Constitutional: Awake and alert, no fever.  No nosebleeds  Abdomen          no abdominal pain   Cardiac              no chest pain  Pulmonary          no shortness of breath      /90 (BP Location: Right arm, Patient Position: Lying)   Pulse 73   Temp 97.9 °F (36.6 °C) (Oral)   Resp 17   Ht 154.9 cm (61\")   Wt 47.6 kg (104 lb 15 oz)   SpO2 92%   BMI 19.83 kg/m²          Physical Exam:  General:  Appears in no acute distress  Eyes: EOM normal no conjunctival drainage,  HEENT:  No JVD. Thyroid not visibly enlarged. No mucosal pallor or cyanosis  Respiratory: Respirations regular and unlabored at rest. BBS with good air entry in all fields. No crackles, rubs or wheezes auscultated  Cardiovascular: S1S2 Regular rate and rhythm. No murmur, rub or gallop. No carotid bruits. DP/PT pulses   2+. No pretibial pitting edema  Gastrointestinal: Abdomen soft, flat, non tender. Bowel sounds present. No hepatosplenomegaly. No ascites  Musculoskeletal: PEGUERO x4. No abnormal movements  Extremities: No digital clubbing or cyanosis  Skin: Color pink. Skin warm and dry to touch. No rashes    Neuro: AAO x3 CN II-XII grossly intact  Psych: Mood and affect normal, pleasant and cooperative            I reviewed the patient's new clinical results, and personally reviewed and interpreted the patient's ECG and telemetry data from the last 24 hours        Cardiographics  Telemetry:   sr                ECG:     SR with RBBB    Echocardiogram:     Interpretation Summary    · Estimated right ventricular systolic pressure from tricuspid regurgitation is normal (<35 mmHg).  · Calculated left ventricular EF = 58.9% Estimated left ventricular EF was in agreement with the calculated left " "ventricular EF.  · Left ventricular diastolic function was normal.  · There is no evidence of pericardial effusion.  Interpretation Summary    · Findings consistent with an indeterminate ECG stress test.  · Left ventricular ejection fraction is normal. (Calculated EF = 64%).  · Myocardial perfusion imaging indicates a small-to-medium-sized, mild-to-moderately severe area of ischemia located in the inferior wall and lateral wall.  · Impressions are consistent with an intermediate risk study.         holter 2021  Interpretation Summary    · A normal monitor study.  · NSR WITH RARE PACS, AND NSSVT           Lab Review   Results from last 7 days   Lab Units 04/02/22  0540 03/31/22  1710   TROPONIN T ng/mL <0.010 0.015     Results from last 7 days   Lab Units 03/31/22  1710   MAGNESIUM mg/dL 2.0     Results from last 7 days   Lab Units 04/06/22  0559   SODIUM mmol/L 136   POTASSIUM mmol/L 4.0   BUN mg/dL 28*   CREATININE mg/dL 0.85   CALCIUM mg/dL 9.4        Results from last 7 days   Lab Units 04/06/22  0559 04/05/22  0624 04/04/22  0745   WBC 10*3/mm3 5.26 9.64 8.50   HEMOGLOBIN g/dL 12.7 12.4 10.9*   HEMATOCRIT % 39.7 39.5 34.9   PLATELETS 10*3/mm3 369 322 297     Results from last 7 days   Lab Units 03/31/22  1710   INR  1.04   APTT seconds 29.9     The following medical decision was discussed in detail with Dr. Fisher      Assessment:     Acute hypoxic respiratory failure  New onset CHF: Now on oral Lasix.  Hypertension  Hyperlipidemia         Plan:    BP elevated this morning prior to medications, rechecked in room 128/58.  Losartan increased to 50 mg yesterday.  Monitor reviewed sinus rhythm, no new events noted.  Stress test yesterday positive, she does not have any chest pain.  We will medical manage unless she were to become symptomatic. Pulmonology recommends CT to be done in outpatient setting to eval for ILD          )4/6/2022  ADAIR Mcdonald            EMR Dragon/Transcription:   \"Dictated " "utilizing Dragon dictation\".     "

## 2022-04-06 NOTE — SIGNIFICANT NOTE
Was not able to do a Walking Oximetry for pt. Pt is being transfer to a SNF that has oxygen if pt requires use.

## 2022-04-06 NOTE — PLAN OF CARE
Goal Outcome Evaluation:              Outcome Evaluation: pt presents with increaed activity tolerance with ability to ambulate up to 40ft with FWW with CGA. Pt will continue to benefit from acute skilled PT to increase overall strength, balance, endurance/activity tolerance and maximize independence with least restrictive AD. recommend SNF placement at this time prior to return independently at [facility].

## 2022-04-07 NOTE — CASE MANAGEMENT/SOCIAL WORK
Case Management Discharge Note      Final Note: d/c to [facility] son transport         Selected Continued Care - Discharged on 4/6/2022 Admission date: 3/31/2022 - Discharge disposition: Skilled Nursing Facility (DC - External)    Destination Coordination complete.    Service Provider Selected Services Address Phone Fax Patient Preferred    [facility]  Skilled Nursing [address] [facility] [id number] [phone] --          Durable Medical Equipment    No services have been selected for the patient.              Dialysis/Infusion    No services have been selected for the patient.              Home Medical Care    No services have been selected for the patient.              Therapy    No services have been selected for the patient.              Community Resources    No services have been selected for the patient.              Community & DME    No services have been selected for the patient.                  Transportation Services  Private: Car    Final Discharge Disposition Code: 03 - skilled nursing facility (SNF)

## 2022-05-05 ENCOUNTER — TRANSCRIBE ORDERS (OUTPATIENT)
Dept: ADMINISTRATIVE | Facility: HOSPITAL | Age: 87
End: 2022-05-05

## 2022-05-05 DIAGNOSIS — R93.89 ABNORMAL COMPUTED TOMOGRAPHY ANGIOGRAPHY (CTA): Primary | ICD-10-CM

## 2022-06-13 ENCOUNTER — HOSPITAL ENCOUNTER (OUTPATIENT)
Dept: CT IMAGING | Facility: HOSPITAL | Age: 87
Discharge: HOME | End: 2022-06-13
Admitting: INTERNAL MEDICINE

## 2022-06-13 DIAGNOSIS — R93.89 ABNORMAL COMPUTED TOMOGRAPHY ANGIOGRAPHY (CTA): ICD-10-CM

## 2022-06-13 PROCEDURE — 71250 CT THORAX DX C-: CPT

## 2022-06-21 ENCOUNTER — TRANSCRIBE ORDERS (OUTPATIENT)
Dept: ADMINISTRATIVE | Facility: HOSPITAL | Age: 87
End: 2022-06-21

## 2022-06-21 DIAGNOSIS — R91.8 LUNG NODULES: Primary | ICD-10-CM

## 2022-06-21 DIAGNOSIS — Z87.891 FORMER SMOKER: ICD-10-CM

## 2022-12-27 ENCOUNTER — HOSPITAL ENCOUNTER (OUTPATIENT)
Dept: CT IMAGING | Facility: HOSPITAL | Age: 87
Discharge: HOME | End: 2022-12-27
Admitting: INTERNAL MEDICINE

## 2022-12-27 DIAGNOSIS — R91.8 LUNG NODULES: ICD-10-CM

## 2022-12-27 DIAGNOSIS — Z87.891 FORMER SMOKER: ICD-10-CM

## 2022-12-27 PROCEDURE — 71250 CT THORAX DX C-: CPT

## 2023-07-21 ENCOUNTER — TRANSCRIBE ORDERS (OUTPATIENT)
Dept: ADMINISTRATIVE | Facility: HOSPITAL | Age: 88
End: 2023-07-21
Payer: MEDICARE

## 2023-07-21 DIAGNOSIS — R91.8 LUNG NODULES: Primary | ICD-10-CM

## 2024-07-15 ENCOUNTER — HOSPITAL ENCOUNTER (OUTPATIENT)
Dept: CT IMAGING | Facility: HOSPITAL | Age: 89
Discharge: HOME | End: 2024-07-15
Admitting: INTERNAL MEDICINE
Payer: MEDICARE

## 2024-07-15 DIAGNOSIS — R91.8 LUNG NODULES: ICD-10-CM

## 2024-07-15 PROCEDURE — 71250 CT THORAX DX C-: CPT
